# Patient Record
Sex: FEMALE | Race: WHITE | NOT HISPANIC OR LATINO | Employment: FULL TIME | URBAN - METROPOLITAN AREA
[De-identification: names, ages, dates, MRNs, and addresses within clinical notes are randomized per-mention and may not be internally consistent; named-entity substitution may affect disease eponyms.]

---

## 2016-05-26 LAB — HCV AB SER-ACNC: NEGATIVE

## 2017-01-10 ENCOUNTER — ALLSCRIPTS OFFICE VISIT (OUTPATIENT)
Dept: OTHER | Facility: OTHER | Age: 52
End: 2017-01-10

## 2017-04-07 ENCOUNTER — GENERIC CONVERSION - ENCOUNTER (OUTPATIENT)
Dept: OTHER | Facility: OTHER | Age: 52
End: 2017-04-07

## 2017-06-13 ENCOUNTER — LAB CONVERSION - ENCOUNTER (OUTPATIENT)
Dept: OTHER | Facility: OTHER | Age: 52
End: 2017-06-13

## 2017-06-13 ENCOUNTER — GENERIC CONVERSION - ENCOUNTER (OUTPATIENT)
Dept: OTHER | Facility: OTHER | Age: 52
End: 2017-06-13

## 2017-06-13 LAB
INR PPP: 1.6
PROTHROMBIN TIME: 16.9 SEC (ref 9–11.5)

## 2017-06-22 ENCOUNTER — ALLSCRIPTS OFFICE VISIT (OUTPATIENT)
Dept: OTHER | Facility: OTHER | Age: 52
End: 2017-06-22

## 2017-06-27 ENCOUNTER — GENERIC CONVERSION - ENCOUNTER (OUTPATIENT)
Dept: OTHER | Facility: OTHER | Age: 52
End: 2017-06-27

## 2017-06-27 LAB
ADDITIONAL INFORMATION (HISTORICAL): NORMAL
ADEQUACY: (HISTORICAL): NORMAL
COMMENT (HISTORICAL): NORMAL
CYTOTECHNOLOGIST: (HISTORICAL): NORMAL
GENERAL CATEGORIZATION (HISTORICAL): NORMAL
INFECTION (HISTORICAL): NORMAL
INTERPRETATION (HISTORICAL): NORMAL
LMP (HISTORICAL): NORMAL
PATHOLOGIST (HISTORICAL): NORMAL
PREV. BX: (HISTORICAL): NORMAL
PREV. PAP (HISTORICAL): NORMAL
REVIEWED BY (HISTORICAL): NORMAL
SOURCE (HISTORICAL): NORMAL

## 2017-08-22 ENCOUNTER — ALLSCRIPTS OFFICE VISIT (OUTPATIENT)
Dept: OTHER | Facility: OTHER | Age: 52
End: 2017-08-22

## 2018-01-02 ENCOUNTER — ALLSCRIPTS OFFICE VISIT (OUTPATIENT)
Dept: OTHER | Facility: OTHER | Age: 53
End: 2018-01-02

## 2018-01-11 NOTE — RESULT NOTES
Verified Results  (1) PT WITH INR [id number] 12:44PM Zhao Hunger   REPORT COMMENT:  REQ ON HOLD  FASTING:YES     Test Name Result Flag Reference   INR 1 7 H    Reference Range                     0 9-1 1  Moderate-intensity Warfarin Therapy 2 0-3 0  Higher-intensity Warfarin Therapy   3 0-4 0   PT 17 4 sec H 9 0-11 5   For more information on this test, go to:  http://[employer]/faq/YRE866

## 2018-01-13 VITALS
SYSTOLIC BLOOD PRESSURE: 122 MMHG | HEIGHT: 66 IN | HEART RATE: 84 BPM | DIASTOLIC BLOOD PRESSURE: 78 MMHG | TEMPERATURE: 97.5 F | RESPIRATION RATE: 16 BRPM

## 2018-01-13 VITALS
DIASTOLIC BLOOD PRESSURE: 76 MMHG | HEART RATE: 87 BPM | HEIGHT: 66 IN | SYSTOLIC BLOOD PRESSURE: 120 MMHG | OXYGEN SATURATION: 99 % | RESPIRATION RATE: 14 BRPM | TEMPERATURE: 97.7 F

## 2018-01-13 NOTE — MISCELLANEOUS
Provider Comments  Provider Comments:   Dear Kary Aquino had a scheduled appointment at our office 06/13/2016 that you called to cancel but did not reschedule for another day  It is very important that you follow up with us so that we can assess your physical and nutritional safety after your surgery  Please call our office at [phone] to reschedule your appointment       Sincerely,     Anna Marie Pitts [facility]        Signatures   Electronically signed by : Margoth Cox [facility]; Rito 10 2016  3:56PM EST                       (Author)

## 2018-01-14 VITALS
DIASTOLIC BLOOD PRESSURE: 82 MMHG | WEIGHT: 188 LBS | BODY MASS INDEX: 30.22 KG/M2 | HEIGHT: 66 IN | SYSTOLIC BLOOD PRESSURE: 132 MMHG

## 2018-01-14 NOTE — RESULT NOTES
Verified Results  (1) PT WITH INR 12Jun2017 02:37PM Margarita Pride     Test Name Result Flag Reference   INR 1 6 H    Reference Range                     0 9-1 1  Moderate-intensity Warfarin Therapy 2 0-3 0  Higher-intensity Warfarin Therapy   3 0-4 0   PT 16 9 sec H 9 0-11 5   For more information on this test, go to:  [email or web address]/faq/CCQ889

## 2018-01-14 NOTE — MISCELLANEOUS
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)   4  Factor V Leiden mutation (289 81) (D68 51)   5  Flu vaccine need (V04 81) (Z23)   6  Hypertension (401 9) (I10)   7  Leg pain (729 5) (M79 606)   8  Obesity (278 00) (E66 9)   9  Obesity surgery status (V45 86) (Z98 84)   10  Postgastrectomy malabsorption (579 3) (K91 2,Z90 3)   11  Pre-operative cardiovascular examination (V72 81) (Z01 810)   12  Pulmonary embolism (415 19) (I26 99)   13  Tinea corporis (110 5) (B35 4)   14  UTI (urinary tract infection) (599 0) (N39 0)   15  Viral syndrome (079 99) (B34 9)   16  Vitamin D deficiency (268 9) (E55 9)    Current Meds   1  ALPRAZolam 0 25 MG Oral Tablet; TAKE ONE TABLET BY MOUTH THREE TIMES   DAILY; Therapy: 46QLS9279 to (Evaluate:05Nov2015); Last Rx:[id number] Ordered   2  Azithromycin 250 MG Oral Tablet; TAKE 2 TABLETS ON DAY 1 THEN TAKE 1 TABLET A   DAY FOR 4 DAYS; Therapy: 48SLE7223 to (Benjamin Brown)  Requested for: 63RAQ7750; Last   Rx:85Axu4636 Ordered   3  Biotin TABS; Take as directed Recorded   4  Calcium Citrate TABS; TAKE 1 TABLET AS DIRECTED Recorded   5  Ciprofloxacin HCl - 500 MG Oral Tablet; TAKE 1 TABLET EVERY 12 HOURS DAILY; Therapy: 23XDR6565 to (Evaluate:15Mar2016)  Requested for: 40IHS5517; Last   Rx:10Mar2016 Ordered   6  Econazole Nitrate 1 % External Cream; APPLY SPARINGLY TO THE AFFECTED   AREA(S) TWICE DAILY; Therapy: 08CBN4738 to (Evaluate:95Aju1904)  Requested for: 94GWS2666; Last   Rx:18Jan2016 Ordered   7  Lisinopril 10 MG Oral Tablet; take 1 tablet by mouth every day; Therapy: 72QDV6741 to (Evaluate:73Kbv2678)  Requested for: 57IJE4797; Last   Rx:93Rqo9489 Ordered   8  Multivitamins Oral Capsule; TAKE 1 CAPSULE DAILY; Therapy: (Gretchen Rose) to Recorded   9   Omeprazole 20 MG Oral Capsule Delayed Release; TAKE 1 CAPSULE DAILY; Therapy: 04KOX2503 to (Fadia Dejesus)  Requested for: 37JJW4195; Last   Rx:21Ffb0548 Ordered   10  Pantoprazole Sodium 20 MG Oral Tablet Delayed Release; TAKE 1 TABLET DAILY; Therapy: 50YIL9230 to (Aldo Macedo)  Requested for: 94MCP8492; Last    Rx:90Zdy8283; Status: ACTIVE - Renewal Denied Ordered   11  Vitamin B-12 SUBL; 1 every day Recorded   12  Warfarin Sodium 5 MG Oral Tablet; TAKE 2 TABLETS EVERY DAY; Therapy: 18UVQ5778 to (Evaluate:12Cjz1625)  Requested for: 94Cja6940; Last    Rx:77Gpl5591 Ordered    Allergies    1   Sudafed TABS    Signatures   Electronically signed by : Ricky Davila, ; Aug 26 2016 12:51PM EST                       (Author)

## 2018-01-14 NOTE — MISCELLANEOUS
Message  Return to work or school:   Maria Guadalupe Bone is under my professional care  She was seen in my office on 1/18/16   She is able to return to work on  1/20/16            Signatures   Electronically signed by :  Elvia Pardo MD; Jan 18 2016  5:01PM EST                       (Author)

## 2018-01-16 NOTE — RESULT NOTES
Verified Results  (1) PT WITH INR 04Apr2017 12:31PM Tabatha Owusu     Test Name Result Flag Reference   INR 2 4 H    Reference Range                     0 9-1 1  Moderate-intensity Warfarin Therapy 2 0-3 0  Higher-intensity Warfarin Therapy   3 0-4 0   PT 24 6 sec H 9 0-11 5   For more information on this test, go to:  http://[employer]/faq/[email or web address]       Plan  Pulmonary embolism    · Warfarin Sodium 5 MG Oral Tablet; TAKE 2 TABLETS EVERY DAY

## 2018-01-17 DIAGNOSIS — Z79.01 LONG TERM CURRENT USE OF ANTICOAGULANT: ICD-10-CM

## 2018-01-17 NOTE — PROGRESS NOTES
Assessment    1  Tinea corporis (110 5) (B35 4)   2  Acute bronchitis (466 0) (J20 9)    Plan  Acute bronchitis    · Azithromycin 250 MG Oral Tablet; TAKE 2 TABLETS ON DAY 1 THEN TAKE 1  TABLET A DAY FOR 4 DAYS  Tinea corporis    · Econazole Nitrate 1 % External Cream; APPLY SPARINGLY TO THE AFFECTED  AREA(S) TWICE DAILY   · Fluconazole 100 MG Oral Tablet; TAKE 1 TABLET DAILY AS DIRECTED    Discussion/Summary    See how pt does on meds  Chief Complaint    1  Cold Symptoms  pt complains of cough, congestion, fatigue, fever sore throat diarrhea and a skin sore x 2 wks      History of Present Illness  Cold Symptoms:   Marisa Yao presents with complaints of frequent episodes of moderate cold symptoms  Symptoms are unchanged  Associated symptoms include nasal congestion, sore throat, dry cough, headache, plugged ear(s), wheezing, fatigue, diarrhea, fever and chills  Review of Systems    Constitutional: fever, feeling poorly and chills, but no recent weight gain and no recent weight loss  ENT: earache and sore throat  Respiratory: cough and wheezing  Integumentary: rash  Neurological: headache  Active Problems    1  Acute deep vein thrombosis of lower limb, unspecified laterality   2  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)   3  Factor V Leiden mutation (289 81) (D68 51)   4  Flu vaccine need (V04 81) (Z23)   5  Hypertension (401 9) (I10)   6  Leg pain (729 5) (M79 606)   7  Obesity (278 00) (E66 9)   8  Obesity surgery status (V45 86) (Z98 84)   9  Postgastrectomy malabsorption (579 3) (K91 2)   10  Pre-operative cardiovascular examination (V72 81) (Z01 810)   11  Pulmonary embolism (415 19) (I26 99)   12  UTI (urinary tract infection) (599 0) (N39 0)   13  Viral syndrome (079 99) (B34 9)   14  Vitamin D deficiency (268 9) (E55 9)    Past Medical History    1  History of sleep apnea (V13 89) (Z87 09)   2  History of Morbid or severe obesity due to excess calories (278 01) (E66 01)   3   History of Pulmonary embolism (415 19) (I26 99)   4  History of Pulmonary Embolism (V12 55)   5  History of URI (upper respiratory infection) (465 9) (J06 9)  Active Problems And Past Medical History Reviewed: The active problems and past medical history were reviewed and updated today  Family History    1  Family history of Arthritis (V17 7)   2  Family history of Congestive Heart Failure   3  Family history of Heart Disease (V17 49)   4  Family history of Hypertension (V17 49)  Family History Reviewed: The family history was reviewed and updated today  Social History    · Being A Social Drinker   ·    · Denied: History of Drug Use   · Exercise Frequency (Times/Week)   · Never A Smoker   · One child  The social history was reviewed and updated today  Surgical History    1  History of Complete Colonoscopy   2  History of Gallbladder Surgery   3  History of Gastric Surgery For Morbid Obesity Laparoscopic Longitudinal Gastrectomy   4  History of Radiologic Supervision: Felicitas Filter Placement In IVC  Surgical History Reviewed: The surgical history was reviewed and updated today  Current Meds   1  ALPRAZolam 0 25 MG Oral Tablet; TAKE ONE TABLET BY MOUTH THREE TIMES   DAILY; Therapy: 23SKI8221 to (Evaluate:05Nov2015); Last Rx:33Llu7067 Ordered   2  Biotin TABS; Take as directed Recorded   3  Calcium Citrate TABS; TAKE 1 TABLET AS DIRECTED Recorded   4  Lisinopril 10 MG Oral Tablet; take 1 tablet by mouth every day; Therapy: 06DKJ7550 to (Evaluate:22Sgu9397)  Requested for: 37ARC7233; Last   Rx:05Npd6142 Ordered   5  Multivitamins Oral Capsule; TAKE 1 CAPSULE DAILY; Therapy: (21 ) to Recorded   6  Pantoprazole Sodium 20 MG Oral Tablet Delayed Release; TAKE 1 TABLET DAILY; Therapy: 41ZEO0072 to (Marquis Guadarrama)  Requested for: 41XGR2291; Last   Rx:65Neu6621; Status: ACTIVE - Renewal Denied Ordered   7  Vitamin B-12 SUBL; 1 every day Recorded   8   Warfarin Sodium 5 MG Oral Tablet; TAKE 2 TABLETS EVERY DAY; Therapy: 07BHB1133 to (Evaluate:84Atz4642)  Requested for: 15Dmb7824; Last   Rx:81Hxa9814 Ordered    The medication list was reviewed and updated today  Allergies    1  Sudafed TABS    Vitals   Recorded: 72UEE2259 04:35PM   Temperature 99 2 F   Heart Rate 76   Systolic 281   Diastolic 70   Height 5 ft 5 5 in   Weight 188 lb    BMI Calculated 30 81   BSA Calculated 1 93     Physical Exam    Constitutional   General appearance: Abnormal   acutely ill, overweight and appears tired  Ears, Nose, Mouth, and Throat   Otoscopic examination: Tympanic membranes translucent with normal light reflex  Canals patent without erythema  Oropharynx: Normal with no erythema, edema, exudate or lesions  Pulmonary   Auscultation of lungs: Abnormal   wheezing over both bases  Cardiovascular   Auscultation of heart: Normal rate and rhythm, normal S1 and S2, without murmurs  Lymphatic   Palpation of lymph nodes in neck: No lymphadenopathy  Future Appointments    Date/Time Provider Specialty Site   04/18/2016 03:30 PM Ghazala Blanco, [facility] [address]     Signatures   Electronically signed by :  Shani Torres MD; Jan 18 2016  4:58PM EST                       (Author)

## 2018-01-21 NOTE — PROGRESS NOTES
Assessment   1  Flu syndrome (487 1) (J11 1)    Plan   Flu syndrome    · Oseltamivir Phosphate 75 MG Oral Capsule (Tamiflu); TAKE 1 CAPSULE TWICE    DAILY WITH MEALS    Chief Complaint   fever/sore throat/body aches/sweats & chills      History of Present Illness   HPI: achy fever since last night did have flu shot Taking APAP      Review of Systems        Constitutional: fever,-- feeling poorly-- and-- chills  ENT: no ear ache, no loss of hearing, no nosebleeds or nasal discharge, no sore throat or hoarseness  Cardiovascular: no complaints of slow or fast heart rate, no chest pain, no palpitations, no leg claudication or lower extremity edema  Respiratory: cough  Breasts: no complaints of breast pain, breast lump or nipple discharge  Gastrointestinal: no complaints of abdominal pain, no constipation, no nausea or diarrhea, no vomiting, no bloody stools  Genitourinary: no complaints of dysuria, no incontinence, no pelvic pain, no dysmenorrhea, no vaginal discharge or abnormal vaginal bleeding  Musculoskeletal: no complaints of arthralgia, no myalgia, no joint swelling or stiffness, no limb pain or swelling  Integumentary: no complaints of skin rash or lesion, no itching or dry skin, no skin wounds  Neurological: no complaints of headache, no confusion, no numbness or tingling, no dizziness or fainting  Active Problems   1  Acute bronchitis (466 0) (J20 9)   2  Acute deep vein thrombosis of lower limb, unspecified laterality   3  Acute non-recurrent maxillary sinusitis (461 0) (J01 00)   4  Acute recurrent maxillary sinusitis (461 0) (J01 01)   5  Acute sinusitis (461 9) (J01 90)   6  Chronic anticoagulation (V58 61) (Z79 01)   7  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)   8  Factor V Leiden mutation (289 81) (D68 51)   9  Flu vaccine need (V04 81) (Z23)   10  Hypertension (401 9) (I10)   11  Leg pain (729 5) (M79 606)   12   Obesity (278 00) (E66 9)   13  Obesity surgery status (V45 86) (Z98 84)   14  Postgastrectomy malabsorption (579 3) (K91 2,Z90 3)   15  Pre-operative cardiovascular examination (V72 81) (Z01 810)   16  Pulmonary embolism (415 19) (I26 99)   17  Situational anxiety (300 09) (F41 8)   18  Tinea corporis (110 5) (B35 4)   19  Vitamin D deficiency (268 9) (E55 9)    Past Medical History   1  History of sleep apnea (V13 89) (Z86 69)   2  History of Morbid or severe obesity due to excess calories (278 01) (E66 01)   3  History of Pulmonary embolism (415 19) (I26 99)   4  History of Pulmonary Embolism (V12 55)  Active Problems And Past Medical History Reviewed: The active problems and past medical history were reviewed and updated today  Family History   Father    1  Family history of Arthritis (V17 7)   2  Family history of Congestive Heart Failure   3  Family history of Heart Disease (V17 49)   4  Family history of Hypertension (V17 49)    Social History    · Being A Social Drinker   · Current non-smoker (V49 89) (Z78 9)   ·    · Denied: History of Drug Use   · Exercise Frequency (Times/Week)   · One child  The social history was reviewed and updated today  The social history was reviewed and is unchanged  Surgical History   1  History of Complete Colonoscopy   2  History of Gallbladder Surgery   3  History of Gastric Surgery For Morbid Obesity Laparoscopic Longitudinal Gastrectomy   4  History of Radiologic Supervision: Felicitas Filter Placement In IVC    Current Meds    1  Biotin TABS; Take as directed Recorded   2  Calcium Citrate TABS; TAKE 1 TABLET AS DIRECTED Recorded   3  Lisinopril 10 MG Oral Tablet; take 1 tablet by mouth every day; Therapy: 58RHE9693 to (Evaluate:99Bnk9723)  Requested for: 58Sgj0359; Last     Rx:21Qhm7742 Ordered   4  Multivitamins Oral Capsule; TAKE 1 CAPSULE DAILY; Therapy: (Bonny Gunter) to Recorded   5  Omeprazole 20 MG Oral Capsule Delayed Release; take 1 capsule daily;      Therapy: 48IAQ7491 to (Evaluate:35Yjf6052)  Requested for: 92Dua7305; Last     Rx:23Cep6605 Ordered   6  Vitamin B-12 SUBL; 1 every day Recorded   7  Warfarin Sodium 5 MG Oral Tablet; TAKE 2 TABLETS EVERY DAY; Therapy: 88SOP9565 to (Evaluate:47Ftf5898)  Requested for: 40Ewg2029; Last     Rx:58Zzm2200 Ordered     The medication list was reviewed and updated today  Allergies   1  Sudafed TABS    Vitals    Recorded: 79WWU9327 02:47PM   Temperature 101 F    Heart Rate 78    Respiration 18    Systolic 655    Diastolic 74    Height 5 ft 5 5 in    Patient Refused Weight Yes Yes     Physical Exam        Constitutional      General appearance: No acute distress, well appearing and well nourished  Ears, Nose, Mouth, and Throat      Otoscopic examination: Tympanic membranes translucent with normal light reflex  Canals patent without erythema  Oropharynx: Normal with no erythema, edema, exudate or lesions  Pulmonary      Auscultation of lungs: Clear to auscultation  Cardiovascular      Auscultation of heart: Normal rate and rhythm, normal S1 and S2, without murmurs  Abdomen      Abdomen: Non-tender, no masses  Lymphatic      Palpation of lymph nodes in neck: No lymphadenopathy  Skin      Skin and subcutaneous tissue: Normal without rashes or lesions         Psychiatric      Orientation to person, place, and time: Normal        Mood and affect: Normal           Signatures    Electronically signed by : Brian Oliva DO; Jan 20 2018  8:24PM EST                       (Author)

## 2018-01-22 VITALS
RESPIRATION RATE: 18 BRPM | DIASTOLIC BLOOD PRESSURE: 74 MMHG | TEMPERATURE: 101 F | HEIGHT: 66 IN | HEART RATE: 78 BPM | SYSTOLIC BLOOD PRESSURE: 126 MMHG

## 2018-01-23 NOTE — MISCELLANEOUS
Message  Return to work or school:   Marisa Yao is under my professional care   She was seen in my office on 01/02/2018   She is able to return to work on  01/08/2018            Signatures   Electronically signed by : Stuart Mccormick DO; Jan 2 2018  3:31PM EST                       (Author)

## 2018-02-11 ENCOUNTER — TELEPHONE (OUTPATIENT)
Dept: FAMILY MEDICINE CLINIC | Facility: CLINIC | Age: 53
End: 2018-02-11

## 2018-02-12 NOTE — TELEPHONE ENCOUNTER
Having obstipation and abd pain  Has tried multile methods for relief    Instructed pt that she may use one more fleets enema and if not effective may need er evaluation

## 2018-02-16 ENCOUNTER — OFFICE VISIT (OUTPATIENT)
Dept: FAMILY MEDICINE CLINIC | Facility: CLINIC | Age: 53
End: 2018-02-16
Payer: COMMERCIAL

## 2018-02-16 VITALS — SYSTOLIC BLOOD PRESSURE: 100 MMHG | HEART RATE: 74 BPM | TEMPERATURE: 97.7 F | DIASTOLIC BLOOD PRESSURE: 60 MMHG

## 2018-02-16 DIAGNOSIS — R10.13 EPIGASTRIC PAIN: ICD-10-CM

## 2018-02-16 DIAGNOSIS — K59.01 SLOW TRANSIT CONSTIPATION: Primary | ICD-10-CM

## 2018-02-16 PROCEDURE — 99213 OFFICE O/P EST LOW 20 MIN: CPT | Performed by: NURSE PRACTITIONER

## 2018-02-16 RX ORDER — MONTELUKAST SODIUM 10 MG/1
1 TABLET ORAL
COMMUNITY
Start: 2017-08-22 | End: 2018-08-02 | Stop reason: SDUPTHER

## 2018-02-16 RX ORDER — SIMETHICONE 180 MG
180 CAPSULE ORAL 2 TIMES DAILY PRN
Qty: 30 CAPSULE | Refills: 0 | Status: SHIPPED | OUTPATIENT
Start: 2018-02-16 | End: 2019-03-18 | Stop reason: CLARIF

## 2018-02-16 RX ORDER — OMEPRAZOLE 20 MG/1
1 CAPSULE, DELAYED RELEASE ORAL DAILY
COMMUNITY
Start: 2014-03-07 | End: 2018-08-29 | Stop reason: SDUPTHER

## 2018-02-16 RX ORDER — MULTIVITAMIN
1 CAPSULE ORAL DAILY
COMMUNITY

## 2018-02-16 RX ORDER — BIOTIN 1 MG
1 TABLET ORAL DAILY
COMMUNITY
End: 2021-11-12 | Stop reason: ALTCHOICE

## 2018-02-16 RX ORDER — FLUTICASONE PROPIONATE 50 MCG
1 SPRAY, SUSPENSION (ML) NASAL DAILY
COMMUNITY
Start: 2017-08-22 | End: 2018-08-29 | Stop reason: SDUPTHER

## 2018-02-16 RX ORDER — WARFARIN SODIUM 5 MG/1
2 TABLET ORAL DAILY
COMMUNITY
Start: 2014-09-08 | End: 2018-04-30 | Stop reason: SDUPTHER

## 2018-02-16 RX ORDER — CALCIUM CITRATE 1040MG
1 TABLET ORAL DAILY
COMMUNITY
End: 2021-11-12 | Stop reason: ALTCHOICE

## 2018-02-16 RX ORDER — POLYETHYLENE GLYCOL 3350 17 G/17G
17 POWDER, FOR SOLUTION ORAL DAILY
Qty: 30 EACH | Refills: 0 | Status: SHIPPED | OUTPATIENT
Start: 2018-02-16 | End: 2019-03-18 | Stop reason: CLARIF

## 2018-02-16 RX ORDER — FAMOTIDINE 40 MG/1
40 TABLET, FILM COATED ORAL 2 TIMES DAILY
Qty: 28 TABLET | Refills: 0 | Status: SHIPPED | OUTPATIENT
Start: 2018-02-16 | End: 2020-01-14 | Stop reason: ALTCHOICE

## 2018-02-16 RX ORDER — MAGNESIUM 200 MG
1 TABLET ORAL DAILY
COMMUNITY

## 2018-02-16 NOTE — PATIENT INSTRUCTIONS
Constipation   AMBULATORY CARE:   Constipation  is when you have hard, dry bowel movements, or you go longer than usual between bowel movements  Constipation may be caused by a lack of water or high-fiber foods  Medicines used to treat pain or depression, or a lack of physical activity may also cause constipation  Common symptoms include the following:   · Trouble pushing out your bowel movement    · Pain or bleeding during your bowel movement    · A feeling that you did not finish having your bowel movement    · Nausea    · Bloating    · Headache  Seek immediate care for the following symptoms:   · Blood in your bowel movement    · A fever and abdominal pain with the constipation  Contact your healthcare provider if:   · Your constipation gets worse  · You start to vomit  · You have questions or concerns about your condition or care  Medicines:   · Medicine or a fiber supplement  may help make your bowel movement softer  A laxative may help relax and loosen your intestines to help you have a bowel movement  You may also be given medicine to increase fluid in your intestines  The fluid may help move bowel movements through your intestines  · Take your medicine as directed  Contact your healthcare provider if you think your medicine is not helping or if you have side effects  Tell him of her if you are allergic to any medicine  Keep a list of the medicines, vitamins, and herbs you take  Include the amounts, and when and why you take them  Bring the list or the pill bottles to follow-up visits  Carry your medicine list with you in case of an emergency  Manage or prevent constipation:   · Drink liquids as directed  You may need to drink extra liquids to help soften and move your bowels  Ask how much liquid to drink each day and which liquids are best for you  · Eat high-fiber foods  This may help decrease constipation by adding bulk to your bowel movements   High-fiber foods include fruit, vegetables, whole-grain breads and cereals, and beans  Your healthcare provider or dietitian can help you create a high-fiber meal plan  · Exercise regularly  Regular physical activity can help stimulate your intestines  Ask which exercises are best for you  · Schedule a time each day to have a bowel movement  This may help train your body to have regular bowel movements  Bend forward while you are on the toilet to help move the bowel movement out  Sit on the toilet for at least 10 minutes, even if you do not have a bowel movement  Follow up with your healthcare provider as directed:  Write down your questions so you remember to ask them during your visits  © 2017 [facility] Information is for End User's use only and may not be sold, redistributed or otherwise used for commercial purposes  All illustrations and images included in CareNotes® are the copyrighted property of [employer]  or Dru Wright  The above information is an  only  It is not intended as medical advice for individual conditions or treatments  Talk to your doctor, nurse or pharmacist before following any medical regimen to see if it is safe and effective for you

## 2018-02-16 NOTE — PROGRESS NOTES
Chief Complaint   Patient presents with    GI Problem     constipation, burning sensation and chest pain  Assessment/Plan:    No problem-specific Assessment & Plan notes found for this encounter  Diagnoses and all orders for this visit:    Slow transit constipation  -     polyethylene glycol (MIRALAX) 17 g packet; Take 17 g by mouth daily    Epigastric pain  -     famotidine (PEPCID) 40 MG tablet; Take 1 tablet (40 mg total) by mouth 2 (two) times a day for 14 days  -     CBC and differential; Future  -     Lipase; Future  -     Amylase; Future  -     simethicone (MYLICON,GAS-X) 611 MG capsule; Take 1 capsule (180 mg total) by mouth 2 (two) times a day as needed for flatulence          Subjective:      Patient ID: Pia Kendall Friday is a 50 y o  female  Abdominal pain like epigastric; water seems to be the only thing relieving it  She feels dehydrated and feels like her skin is dry  Headaches; but has not had caffeine  Using nexium  She states she was having bowel movements for 3 days again  But feels like she is constipated again  Constipation   This is a new problem  The problem has been gradually worsening since onset  The stool is described as formed  The patient is on a high fiber diet  She does not exercise regularly  There has been adequate water intake  Associated symptoms include abdominal pain, back pain, bloating, flatus (mild) and rectal pain (but has subsided)  Pertinent negatives include no diarrhea, fever or hemorrhoids  She has tried enemas and laxatives for the symptoms  The treatment provided moderate relief  Her past medical history is significant for abdominal surgery  The following portions of the patient's history were reviewed and updated as appropriate: allergies, current medications, past family history, past medical history, past social history, past surgical history and problem list     Review of Systems   Constitutional: Positive for fatigue   Negative for appetite change and fever  Respiratory: Negative for cough  Gastrointestinal: Positive for abdominal pain, bloating, constipation, flatus (mild) and rectal pain (but has subsided)  Negative for diarrhea and hemorrhoids  Genitourinary: Negative for dysuria  Musculoskeletal: Positive for back pain  Neurological: Positive for headaches  Objective:      /60 (BP Location: Left arm, Patient Position: Sitting, Cuff Size: Adult)   Pulse 74   Temp 97 7 °F (36 5 °C) (Temporal)   LMP 02/03/2018   Breastfeeding? No          Physical Exam   Constitutional: Vital signs are normal  She appears well-developed and well-nourished  Cardiovascular: Normal rate, regular rhythm, S1 normal and S2 normal     Pulmonary/Chest: Effort normal and breath sounds normal    Abdominal: Soft  She exhibits distension  She exhibits no mass  Bowel sounds are increased  There is tenderness in the epigastric area and left lower quadrant

## 2018-02-17 LAB
AMYLASE SERPL-CCNC: 27 U/L (ref 21–101)
BASOPHILS # BLD AUTO: 29 CELLS/UL (ref 0–200)
BASOPHILS NFR BLD AUTO: 0.4 %
EOSINOPHIL # BLD AUTO: 122 CELLS/UL (ref 15–500)
EOSINOPHIL NFR BLD AUTO: 1.7 %
ERYTHROCYTE [DISTWIDTH] IN BLOOD BY AUTOMATED COUNT: 18.7 % (ref 11–15)
HCT VFR BLD AUTO: 30.9 % (ref 35–45)
HGB BLD-MCNC: 9.4 G/DL (ref 11.7–15.5)
INR PPP: 2
LIPASE SERPL-CCNC: 19 U/L (ref 7–60)
LYMPHOCYTES # BLD AUTO: 2225 CELLS/UL (ref 850–3900)
LYMPHOCYTES NFR BLD AUTO: 30.9 %
MCH RBC QN AUTO: 20.1 PG (ref 27–33)
MCHC RBC AUTO-ENTMCNC: 30.4 G/DL (ref 32–36)
MCV RBC AUTO: 66.2 FL (ref 80–100)
MONOCYTES # BLD AUTO: 590 CELLS/UL (ref 200–950)
MONOCYTES NFR BLD AUTO: 8.2 %
NEUTROPHILS # BLD AUTO: 4234 CELLS/UL (ref 1500–7800)
NEUTROPHILS NFR BLD AUTO: 58.8 %
PLATELET # BLD AUTO: 297 THOUSAND/UL (ref 140–400)
PMV BLD REES-ECKER: 10.5 FL (ref 7.5–12.5)
PROTHROMBIN TIME: 20.3 SEC (ref 9–11.5)
RBC # BLD AUTO: 4.67 MILLION/UL (ref 3.8–5.1)
WBC # BLD AUTO: 7.2 THOUSAND/UL (ref 3.8–10.8)

## 2018-02-22 ENCOUNTER — TELEPHONE (OUTPATIENT)
Dept: FAMILY MEDICINE CLINIC | Facility: CLINIC | Age: 53
End: 2018-02-22

## 2018-02-23 NOTE — TELEPHONE ENCOUNTER
hiralcb on Monday to give her results and also informed her it is not something to worry about over the weekend

## 2018-02-23 NOTE — TELEPHONE ENCOUNTER
Please call juan and let her know that she is anemic, low iron most likely cause   Could have an ulcer if still having pain  Would like her to ssee surgeon she had for baariatrics but if they wont see her we will refer her to someone else    All other lab looked ok so not worried about other gi issuees except that iron is constipating and she is already having problem

## 2018-02-26 DIAGNOSIS — D64.9 ANEMIA, UNSPECIFIED TYPE: ICD-10-CM

## 2018-02-26 DIAGNOSIS — R10.10 PAIN OF UPPER ABDOMEN: Primary | ICD-10-CM

## 2018-02-26 NOTE — TELEPHONE ENCOUNTER
Patient called back  And results were given to her  Patient will like Dr Maria Ines Ayala to give her a call back since she will like to ask her a few questions  Patient also said her Bariatric services were removed from her plan and she is unable to be seen by them again

## 2018-02-27 ENCOUNTER — TELEPHONE (OUTPATIENT)
Dept: FAMILY MEDICINE CLINIC | Facility: CLINIC | Age: 53
End: 2018-02-27

## 2018-02-27 DIAGNOSIS — D50.9 IRON DEFICIENCY ANEMIA, UNSPECIFIED IRON DEFICIENCY ANEMIA TYPE: Primary | ICD-10-CM

## 2018-02-27 NOTE — TELEPHONE ENCOUNTER
Update on why her iron level could be low  Patient wanted to update you that she has been on coumadin for years and she does have an irregular period where she spots an entire week before her period  When she does get her period she has very heavy bleeding for another week  She started with her period today as well

## 2018-02-27 NOTE — TELEPHONE ENCOUNTER
Pt called in to follow up on GI referral pt was under the impression that we were going to schedule the GI appointment  LVMTCB

## 2018-03-01 ENCOUNTER — OFFICE VISIT (OUTPATIENT)
Dept: GASTROENTEROLOGY | Facility: MEDICAL CENTER | Age: 53
End: 2018-03-01
Payer: COMMERCIAL

## 2018-03-01 VITALS
WEIGHT: 207 LBS | HEIGHT: 66 IN | DIASTOLIC BLOOD PRESSURE: 70 MMHG | TEMPERATURE: 96.5 F | SYSTOLIC BLOOD PRESSURE: 118 MMHG | BODY MASS INDEX: 33.27 KG/M2 | HEART RATE: 72 BPM

## 2018-03-01 DIAGNOSIS — Z12.11 COLON CANCER SCREENING: ICD-10-CM

## 2018-03-01 DIAGNOSIS — K21.9 GASTROESOPHAGEAL REFLUX DISEASE WITHOUT ESOPHAGITIS: ICD-10-CM

## 2018-03-01 DIAGNOSIS — R14.0 ABDOMINAL BLOATING: ICD-10-CM

## 2018-03-01 DIAGNOSIS — K59.04 CHRONIC IDIOPATHIC CONSTIPATION: Primary | ICD-10-CM

## 2018-03-01 DIAGNOSIS — D64.9 ANEMIA, UNSPECIFIED TYPE: ICD-10-CM

## 2018-03-01 LAB
ERYTHROCYTE [DISTWIDTH] IN BLOOD BY AUTOMATED COUNT: 18.3 % (ref 11–15)
HCT VFR BLD AUTO: 31.4 % (ref 35–45)
HGB BLD-MCNC: 9.2 G/DL (ref 11.7–15.5)
MCH RBC QN AUTO: 19.7 PG (ref 27–33)
MCHC RBC AUTO-ENTMCNC: 29.3 G/DL (ref 32–36)
MCV RBC AUTO: 67.2 FL (ref 80–100)
PLATELET # BLD AUTO: 331 THOUSAND/UL (ref 140–400)
PMV BLD REES-ECKER: 10.5 FL (ref 7.5–12.5)
RBC # BLD AUTO: 4.67 MILLION/UL (ref 3.8–5.1)
WBC # BLD AUTO: 6.3 THOUSAND/UL (ref 3.8–10.8)

## 2018-03-01 PROCEDURE — 99244 OFF/OP CNSLTJ NEW/EST MOD 40: CPT | Performed by: INTERNAL MEDICINE

## 2018-03-01 NOTE — LETTER
March 1, 2018     [place] DO [facility]  [address] [facility]  [address]    Patient: Jerardo Fuller Friday   YOB: 1965   Date of Visit: 3/1/2018       Dear Dr Deneen Feliz:    Thank you for referring Estefania Friday to me for evaluation  Below are my notes for this consultation  If you have questions, please do not hesitate to call me  I look forward to following your patient along with you           Sincerely,        Jean Paul Johnson MD        CC: No Recipients

## 2018-03-01 NOTE — PROGRESS NOTES
[facility] - Outpatient Consultation  Estefania VASQUEZ Friday 50 y o  female MRN: [id number]  Encounter: [id number]          ASSESSMENT AND PLAN:      1  Chronic idiopathic constipation  Recommend uptitration of miralax, discussed titration as needed - start with once daily and increase  Increase hydration, dietary fiber  Gave sample for 145 mcg linzess to attempt if symptoms not improved with miralax    2  Anemia, unspecified type  Rule out celiac disease  Plan for egd    - Ambulatory referral to Gastroenterology  - Tissue transglutaminase, IgA; Future  - IgA; Future  - Comprehensive metabolic panel; Future  - Case request operating room: EGD AND COLONOSCOPY; Standing  - Na Sulfate-K Sulfate-Mg Sulf (SUPREP BOWEL PREP KIT) 17 5-3 13-1 6 GM/180ML SOLN; Take 177 mL by mouth once for 1 dose  Dispense: 2 Bottle; Refill: 0  - Case request operating room: EGD AND COLONOSCOPY    3  Colon cancer screening  Recommend colonoscopy  She will hold her coumadin for 5 days prior to the procedure    4  Abdominal bloating  - Tissue transglutaminase, IgA; Future  - IgA; Future  - Comprehensive metabolic panel; Future    5  GERD  Discontinue nexium  Start pepcid as needed  Discussed avoidance of trigger foods, including tomatoes and orange juice    ______________________________________________________________________    HPI:      Ms  Friday is a 62-year-old female who is referred for evaluation of anemia, constipation, colorectal cancer screening  She has a past medical history of a sleeve gastrectomy for history of morbid obesity, and factor five Leiden deficiency on Coumadin  She complains of a three week history constipation associated with lower quadrant abdominal cramping and abdominal bloating  She has attempted MiraLax 17 g every other day, suppositories, enemas  She relates severe symptoms requiring one time use of digital disimpaction  She denies any rectal bleeding      Additionally, she is referred for evaluation of anemia  Her hemoglobin was recently 8 5  She is concerned this is secondary to dysfunctional uterine bleeding, that is being worked up by her gynecologist   At this time she is recommended to defer hysterectomy  She does note increasing fatigue from this however  She would like to start taking iron supplementation, however is concerned about her constipation  She takes nexium as needed for GERD  She relates triggers of orange juice and tomatoes, which she avoids  She denies use of over-the-counter NSAIDs  She was recently exposed to antibiotics for treatment of a sinus infection in December and flu in January  She has no family history of colo/rectal cancer  She has not had a screening colonoscopy  REVIEW OF SYSTEMS:    CONSTITUTIONAL: Denies any fever, chills, rigors, and weight loss  HEENT: No earache or tinnitus  Denies hearing loss or visual disturbances  CARDIOVASCULAR: No chest pain or palpitations  RESPIRATORY: Denies any cough, hemoptysis, shortness of breath or dyspnea on exertion  GASTROINTESTINAL: As noted in the History of Present Illness  GENITOURINARY: No problems with urination  Denies any hematuria or dysuria  NEUROLOGIC: No dizziness or vertigo, denies headaches  MUSCULOSKELETAL: Denies any muscle or joint pain  SKIN: Denies skin rashes or itching  ENDOCRINE: Denies excessive thirst  Denies intolerance to heat or cold  PSYCHOSOCIAL: Denies depression or anxiety  Denies any recent memory loss  Historical Information   Past Medical History:   Diagnosis Date    [facility]     [facility]     Pulmonary embolism (HCC)     S/P gastric surgery     gastric sleeve      No past surgical history on file    Social History   History   Alcohol Use    Yes     Comment: socially     History   Drug Use No     History   Smoking Status    Never Smoker   Smokeless Tobacco    Never Used     Family History   Problem Relation Age of Onset    Osteoporosis Mother     Diabetes Father     Heart disease Father        Meds/Allergies       Current Outpatient Prescriptions:     Biotin 1000 MCG tablet    Calcium Citrate 1040 MG TABS    Cyanocobalamin (VITAMIN B-12) 1000 MCG SUBL    famotidine (PEPCID) 40 MG tablet    fluticasone (FLONASE) 50 mcg/act nasal spray    montelukast (SINGULAIR) 10 mg tablet    Multiple Vitamin (MULTIVITAMIN) capsule    omeprazole (PriLOSEC) 20 mg delayed release capsule    polyethylene glycol (MIRALAX) 17 g packet    simethicone (MYLICON,GAS-X) 436 MG capsule    warfarin (COUMADIN) 5 mg tablet    Na Sulfate-K Sulfate-Mg Sulf (SUPREP BOWEL PREP KIT) 17 5-3 13-1 6 GM/180ML SOLN    Allergies   Allergen Reactions    Pseudoephedrine Tachycardia    Latex Rash           Objective     Blood pressure 118/70, pulse 72, temperature (!) 96 5 °F (35 8 °C), temperature source Tympanic, height 5' 6" (1 676 m), weight 93 9 kg (207 lb), last menstrual period 02/03/2018, not currently breastfeeding  PHYSICAL EXAM:      General Appearance:   Alert, cooperative, no distress   HEENT:   Normocephalic, atraumatic, anicteric      Neck:  Supple, symmetrical, trachea midline   Lungs:   Clear to auscultation bilaterally; no rales, rhonchi or wheezing; respirations unlabored    Heart[de-identified]   Regular rate and rhythm; no murmur, rub, or gallop  Abdomen:   Soft, non-tender, non-distended; normal bowel sounds; no masses, no organomegaly, + well healed laparoscopic RUQ scars s/p cholecystectomy    Genitalia:   Deferred    Rectal:   Deferred    Extremities:  No cyanosis, clubbing or edema    Pulses:  2+ and symmetric    Skin:  No jaundice, rashes, or lesions    Lymph nodes:  No palpable cervical lymphadenopathy        Lab Results:   No visits with results within 1 Day(s) from this visit     Latest known visit with results is:   Orders Only on 02/28/2018   Component Date Value    SL AMB LAB WHITE BLOOD C* 02/28/2018 6 3   AMB LAB RED BLOOD HERMINIA* 02/28/2018 4 67     Hemoglobin 02/28/2018 9 2*    Hematocrit 02/28/2018 31 4*    MCV 02/28/2018 67 2*    MCH 02/28/2018 19 7*    MCHC 02/28/2018 29 3*    RDW 02/28/2018 18 3*    Platelet Count 56/63/3471 331      AMB MPV 02/28/2018 10 5          Radiology Results:   No results found

## 2018-03-01 NOTE — LETTER
March 1, 2018     Caro Regan, DO  [address] [facility]  [address] [facility]    Patient: Lakeisha Rollins Friday   YOB: 1965   Date of Visit: 3/1/2018       Dear Dr Jesse Escalante:    Thank you for referring Estefania Friday to me for evaluation  Below are my notes for this consultation  If you have questions, please do not hesitate to call me  I look forward to following your patient along with you  Sincerely,        Oliva Tejada MD        CC: No Recipients  Oliva Tejada MD  3/1/2018 11:17 AM  Sign at close encounter  [facility] - Outpatient Consultation  Estefania VASQUEZ Friday 50 y o  female MRN: [id number]  Encounter: [id number]          ASSESSMENT AND PLAN:      1  Chronic idiopathic constipation  Recommend uptitration of miralax, discussed titration as needed - start with once daily and increase  Increase hydration, dietary fiber  Gave sample for 145 mcg linzess to attempt if symptoms not improved with miralax    2  Anemia, unspecified type  Rule out celiac disease  Plan for egd    - Ambulatory referral to Gastroenterology  - Tissue transglutaminase, IgA; Future  - IgA; Future  - Comprehensive metabolic panel; Future  - Case request operating room: EGD AND COLONOSCOPY; Standing  - Na Sulfate-K Sulfate-Mg Sulf (SUPREP BOWEL PREP KIT) 17 5-3 13-1 6 GM/180ML SOLN; Take 177 mL by mouth once for 1 dose  Dispense: 2 Bottle; Refill: 0  - Case request operating room: EGD AND COLONOSCOPY    3  Colon cancer screening  Recommend colonoscopy    4  Abdominal bloating  - Tissue transglutaminase, IgA; Future  - IgA; Future  - Comprehensive metabolic panel; Future    5  GERD  Discontinue nexium  Start pepcid as needed  Discussed avoidance of trigger foods, including tomatoes and orange juice    ______________________________________________________________________    HPI:      Ms  Friday is a 19-year-old female who is referred for evaluation of anemia, constipation, colorectal cancer screening  She has a past medical history of a sleeve gastrectomy for history of morbid obesity, and factor five Leiden deficiency on Coumadin  She complains of a three week history constipation associated with lower quadrant abdominal cramping and abdominal bloating  She has attempted MiraLax 17 g every other day, suppositories, enemas  She relates severe symptoms requiring one time use of digital disimpaction  She denies any rectal bleeding  Additionally, she is referred for evaluation of anemia  Her hemoglobin was recently 8 5  She is concerned this is secondary to dysfunctional uterine bleeding, that is being worked up by her gynecologist   At this time she is recommended to defer hysterectomy  She does note increasing fatigue from this however  She would like to start taking iron supplementation, however is concerned about her constipation  She takes nexium as needed for GERD  She relates triggers of orange juice and tomatoes, which she avoids  She denies use of over-the-counter NSAIDs  She was recently exposed to antibiotics for treatment of a sinus infection in December and flu in January  She has no family history of colo/rectal cancer  She has not had a screening colonoscopy  REVIEW OF SYSTEMS:    CONSTITUTIONAL: Denies any fever, chills, rigors, and weight loss  HEENT: No earache or tinnitus  Denies hearing loss or visual disturbances  CARDIOVASCULAR: No chest pain or palpitations  RESPIRATORY: Denies any cough, hemoptysis, shortness of breath or dyspnea on exertion  GASTROINTESTINAL: As noted in the History of Present Illness  GENITOURINARY: No problems with urination  Denies any hematuria or dysuria  NEUROLOGIC: No dizziness or vertigo, denies headaches  MUSCULOSKELETAL: Denies any muscle or joint pain  SKIN: Denies skin rashes or itching  ENDOCRINE: Denies excessive thirst  Denies intolerance to heat or cold  PSYCHOSOCIAL: Denies depression or anxiety   Denies any recent memory loss        Historical Information   Past Medical History:   Diagnosis Date    [facility]     [facility]     Pulmonary embolism (HCC)     S/P gastric surgery     gastric sleeve      No past surgical history on file  Social History   History   Alcohol Use    Yes     Comment: socially     History   Drug Use No     History   Smoking Status    Never Smoker   Smokeless Tobacco    Never Used     Family History   Problem Relation Age of Onset    Osteoporosis Mother     Diabetes Father     Heart disease Father        Meds/Allergies       Current Outpatient Prescriptions:     Biotin 1000 MCG tablet    Calcium Citrate 1040 MG TABS    Cyanocobalamin (VITAMIN B-12) 1000 MCG SUBL    famotidine (PEPCID) 40 MG tablet    fluticasone (FLONASE) 50 mcg/act nasal spray    montelukast (SINGULAIR) 10 mg tablet    Multiple Vitamin (MULTIVITAMIN) capsule    omeprazole (PriLOSEC) 20 mg delayed release capsule    polyethylene glycol (MIRALAX) 17 g packet    simethicone (MYLICON,GAS-X) 901 MG capsule    warfarin (COUMADIN) 5 mg tablet    Na Sulfate-K Sulfate-Mg Sulf (SUPREP BOWEL PREP KIT) 17 5-3 13-1 6 GM/180ML SOLN    Allergies   Allergen Reactions    Pseudoephedrine Tachycardia    Latex Rash           Objective     Blood pressure 118/70, pulse 72, temperature (!) 96 5 °F (35 8 °C), temperature source Tympanic, height 5' 6" (1 676 m), weight 93 9 kg (207 lb), last menstrual period 02/03/2018, not currently breastfeeding  PHYSICAL EXAM:      General Appearance:   Alert, cooperative, no distress   HEENT:   Normocephalic, atraumatic, anicteric      Neck:  Supple, symmetrical, trachea midline   Lungs:   Clear to auscultation bilaterally; no rales, rhonchi or wheezing; respirations unlabored    Heart[de-identified]   Regular rate and rhythm; no murmur, rub, or gallop     Abdomen:   Soft, non-tender, non-distended; normal bowel sounds; no masses, no organomegaly, + well healed laparoscopic RUQ scars s/p cholecystectomy    Genitalia:   Deferred    Rectal:   Deferred    Extremities:  No cyanosis, clubbing or edema    Pulses:  2+ and symmetric    Skin:  No jaundice, rashes, or lesions    Lymph nodes:  No palpable cervical lymphadenopathy        Lab Results:   No visits with results within 1 Day(s) from this visit  Latest known visit with results is:   Orders Only on 02/28/2018   Component Date Value     AMB LAB WHITE BLOOD C* 02/28/2018 6 3      AMB LAB RED BLOOD HERMINIA* 02/28/2018 4 67     Hemoglobin 02/28/2018 9 2*    Hematocrit 02/28/2018 31 4*    MCV 02/28/2018 67 2*    MCH 02/28/2018 19 7*    MCHC 02/28/2018 29 3*    RDW 02/28/2018 18 3*    Platelet Count 58/28/3243 331      AMB MPV 02/28/2018 10 5          Radiology Results:   No results found

## 2018-03-02 ENCOUNTER — TELEPHONE (OUTPATIENT)
Dept: GASTROENTEROLOGY | Facility: HOSPITAL | Age: 53
End: 2018-03-02

## 2018-03-06 ENCOUNTER — ANESTHESIA EVENT (OUTPATIENT)
Dept: GASTROENTEROLOGY | Facility: MEDICAL CENTER | Age: 53
End: 2018-03-06
Payer: COMMERCIAL

## 2018-03-08 ENCOUNTER — HOSPITAL ENCOUNTER (OUTPATIENT)
Facility: MEDICAL CENTER | Age: 53
Setting detail: OUTPATIENT SURGERY
Discharge: HOME | End: 2018-03-08
Attending: INTERNAL MEDICINE | Admitting: INTERNAL MEDICINE
Payer: COMMERCIAL

## 2018-03-08 ENCOUNTER — ANESTHESIA (OUTPATIENT)
Dept: GASTROENTEROLOGY | Facility: MEDICAL CENTER | Age: 53
End: 2018-03-08
Payer: COMMERCIAL

## 2018-03-08 VITALS
HEART RATE: 60 BPM | DIASTOLIC BLOOD PRESSURE: 61 MMHG | RESPIRATION RATE: 18 BRPM | SYSTOLIC BLOOD PRESSURE: 109 MMHG | BODY MASS INDEX: 32.14 KG/M2 | WEIGHT: 200 LBS | OXYGEN SATURATION: 100 % | HEIGHT: 66 IN | TEMPERATURE: 98 F

## 2018-03-08 DIAGNOSIS — Z12.11 COLON CANCER SCREENING: ICD-10-CM

## 2018-03-08 DIAGNOSIS — D64.9 ANEMIA, UNSPECIFIED TYPE: ICD-10-CM

## 2018-03-08 LAB — EXT PREGNANCY TEST URINE: NEGATIVE

## 2018-03-08 PROCEDURE — 43239 EGD BIOPSY SINGLE/MULTIPLE: CPT | Performed by: INTERNAL MEDICINE

## 2018-03-08 PROCEDURE — 81025 URINE PREGNANCY TEST: CPT | Performed by: ANESTHESIOLOGY

## 2018-03-08 PROCEDURE — G0121 COLON CA SCRN NOT HI RSK IND: HCPCS | Performed by: INTERNAL MEDICINE

## 2018-03-08 PROCEDURE — 88305 TISSUE EXAM BY PATHOLOGIST: CPT | Performed by: PATHOLOGY

## 2018-03-08 PROCEDURE — 88342 IMHCHEM/IMCYTCHM 1ST ANTB: CPT | Performed by: PATHOLOGY

## 2018-03-08 RX ORDER — PROPOFOL 10 MG/ML
INJECTION, EMULSION INTRAVENOUS AS NEEDED
Status: DISCONTINUED | OUTPATIENT
Start: 2018-03-08 | End: 2018-03-08 | Stop reason: SURG

## 2018-03-08 RX ORDER — SODIUM CHLORIDE 9 MG/ML
125 INJECTION, SOLUTION INTRAVENOUS CONTINUOUS
Status: DISCONTINUED | OUTPATIENT
Start: 2018-03-08 | End: 2018-03-08 | Stop reason: HOSPADM

## 2018-03-08 RX ADMIN — SODIUM CHLORIDE 125 ML/HR: 0.9 INJECTION, SOLUTION INTRAVENOUS at 09:10

## 2018-03-08 RX ADMIN — PROPOFOL 50 MG: 10 INJECTION, EMULSION INTRAVENOUS at 10:12

## 2018-03-08 RX ADMIN — PROPOFOL 100 MG: 10 INJECTION, EMULSION INTRAVENOUS at 09:47

## 2018-03-08 RX ADMIN — PROPOFOL 50 MG: 10 INJECTION, EMULSION INTRAVENOUS at 09:57

## 2018-03-08 RX ADMIN — PROPOFOL 50 MG: 10 INJECTION, EMULSION INTRAVENOUS at 10:05

## 2018-03-08 RX ADMIN — PROPOFOL 50 MG: 10 INJECTION, EMULSION INTRAVENOUS at 10:03

## 2018-03-08 RX ADMIN — PROPOFOL 200 MG: 10 INJECTION, EMULSION INTRAVENOUS at 09:46

## 2018-03-08 RX ADMIN — PROPOFOL 50 MG: 10 INJECTION, EMULSION INTRAVENOUS at 09:51

## 2018-03-08 NOTE — H&P (VIEW-ONLY)
[facility] - Outpatient Consultation  Estefania VASQUEZ Friday 50 y o  female MRN: [id number]  Encounter: [id number]          ASSESSMENT AND PLAN:      1  Chronic idiopathic constipation  Recommend uptitration of miralax, discussed titration as needed - start with once daily and increase  Increase hydration, dietary fiber  Gave sample for 145 mcg linzess to attempt if symptoms not improved with miralax    2  Anemia, unspecified type  Rule out celiac disease  Plan for egd    - Ambulatory referral to Gastroenterology  - Tissue transglutaminase, IgA; Future  - IgA; Future  - Comprehensive metabolic panel; Future  - Case request operating room: EGD AND COLONOSCOPY; Standing  - Na Sulfate-K Sulfate-Mg Sulf (SUPREP BOWEL PREP KIT) 17 5-3 13-1 6 GM/180ML SOLN; Take 177 mL by mouth once for 1 dose  Dispense: 2 Bottle; Refill: 0  - Case request operating room: EGD AND COLONOSCOPY    3  Colon cancer screening  Recommend colonoscopy  She will hold her coumadin for 5 days prior to the procedure    4  Abdominal bloating  - Tissue transglutaminase, IgA; Future  - IgA; Future  - Comprehensive metabolic panel; Future    5  GERD  Discontinue nexium  Start pepcid as needed  Discussed avoidance of trigger foods, including tomatoes and orange juice    ______________________________________________________________________    HPI:      Ms  Friday is a 53-year-old female who is referred for evaluation of anemia, constipation, colorectal cancer screening  She has a past medical history of a sleeve gastrectomy for history of morbid obesity, and factor five Leiden deficiency on Coumadin  She complains of a three week history constipation associated with lower quadrant abdominal cramping and abdominal bloating  She has attempted MiraLax 17 g every other day, suppositories, enemas  She relates severe symptoms requiring one time use of digital disimpaction  She denies any rectal bleeding      Additionally, she is referred for evaluation of anemia  Her hemoglobin was recently 8 5  She is concerned this is secondary to dysfunctional uterine bleeding, that is being worked up by her gynecologist   At this time she is recommended to defer hysterectomy  She does note increasing fatigue from this however  She would like to start taking iron supplementation, however is concerned about her constipation  She takes nexium as needed for GERD  She relates triggers of orange juice and tomatoes, which she avoids  She denies use of over-the-counter NSAIDs  She was recently exposed to antibiotics for treatment of a sinus infection in December and flu in January  She has no family history of colo/rectal cancer  She has not had a screening colonoscopy  REVIEW OF SYSTEMS:    CONSTITUTIONAL: Denies any fever, chills, rigors, and weight loss  HEENT: No earache or tinnitus  Denies hearing loss or visual disturbances  CARDIOVASCULAR: No chest pain or palpitations  RESPIRATORY: Denies any cough, hemoptysis, shortness of breath or dyspnea on exertion  GASTROINTESTINAL: As noted in the History of Present Illness  GENITOURINARY: No problems with urination  Denies any hematuria or dysuria  NEUROLOGIC: No dizziness or vertigo, denies headaches  MUSCULOSKELETAL: Denies any muscle or joint pain  SKIN: Denies skin rashes or itching  ENDOCRINE: Denies excessive thirst  Denies intolerance to heat or cold  PSYCHOSOCIAL: Denies depression or anxiety  Denies any recent memory loss  Historical Information   Past Medical History:   Diagnosis Date    [facility]     [facility]     Pulmonary embolism (HCC)     S/P gastric surgery     gastric sleeve      No past surgical history on file    Social History   History   Alcohol Use    Yes     Comment: socially     History   Drug Use No     History   Smoking Status    Never Smoker   Smokeless Tobacco    Never Used     Family History   Problem Relation Age of Onset    Osteoporosis Mother     Diabetes Father     Heart disease Father        Meds/Allergies       Current Outpatient Prescriptions:     Biotin 1000 MCG tablet    Calcium Citrate 1040 MG TABS    Cyanocobalamin (VITAMIN B-12) 1000 MCG SUBL    famotidine (PEPCID) 40 MG tablet    fluticasone (FLONASE) 50 mcg/act nasal spray    montelukast (SINGULAIR) 10 mg tablet    Multiple Vitamin (MULTIVITAMIN) capsule    omeprazole (PriLOSEC) 20 mg delayed release capsule    polyethylene glycol (MIRALAX) 17 g packet    simethicone (MYLICON,GAS-X) 883 MG capsule    warfarin (COUMADIN) 5 mg tablet    Na Sulfate-K Sulfate-Mg Sulf (SUPREP BOWEL PREP KIT) 17 5-3 13-1 6 GM/180ML SOLN    Allergies   Allergen Reactions    Pseudoephedrine Tachycardia    Latex Rash           Objective     Blood pressure 118/70, pulse 72, temperature (!) 96 5 °F (35 8 °C), temperature source Tympanic, height 5' 6" (1 676 m), weight 93 9 kg (207 lb), last menstrual period 02/03/2018, not currently breastfeeding  PHYSICAL EXAM:      General Appearance:   Alert, cooperative, no distress   HEENT:   Normocephalic, atraumatic, anicteric      Neck:  Supple, symmetrical, trachea midline   Lungs:   Clear to auscultation bilaterally; no rales, rhonchi or wheezing; respirations unlabored    Heart[de-identified]   Regular rate and rhythm; no murmur, rub, or gallop  Abdomen:   Soft, non-tender, non-distended; normal bowel sounds; no masses, no organomegaly, + well healed laparoscopic RUQ scars s/p cholecystectomy    Genitalia:   Deferred    Rectal:   Deferred    Extremities:  No cyanosis, clubbing or edema    Pulses:  2+ and symmetric    Skin:  No jaundice, rashes, or lesions    Lymph nodes:  No palpable cervical lymphadenopathy        Lab Results:   No visits with results within 1 Day(s) from this visit     Latest known visit with results is:   Orders Only on 02/28/2018   Component Date Value    SL AMB LAB WHITE BLOOD C* 02/28/2018 6 3   AMB LAB RED BLOOD HERMINIA* 02/28/2018 4 67     Hemoglobin 02/28/2018 9 2*    Hematocrit 02/28/2018 31 4*    MCV 02/28/2018 67 2*    MCH 02/28/2018 19 7*    MCHC 02/28/2018 29 3*    RDW 02/28/2018 18 3*    Platelet Count 70/49/4275 331      AMB MPV 02/28/2018 10 5          Radiology Results:   No results found

## 2018-03-08 NOTE — ANESTHESIA PREPROCEDURE EVALUATION
Review of Systems/Medical History  Patient summary reviewed  Chart reviewed  History of anesthetic complications PONV    Cardiovascular  Exercise tolerance: good,     Pulmonary  Negative pulmonary ROS        GI/Hepatic    GERD ,        Negative  ROS        Endo/Other  Negative endo/other ROS      GYN  Negative gynecology ROS          Hematology  Anemia ,     Musculoskeletal  Negative musculoskeletal ROS        Neurology  Negative neurology ROS      Psychology   Negative psychology ROS              Physical Exam    Airway    Mallampati score: II  TM Distance: <3 FB  Neck ROM: full     Dental       Cardiovascular  Rhythm: regular, Rate: normal,     Pulmonary  Breath sounds clear to auscultation,     Other Findings        Anesthesia Plan  ASA Score- 3     Anesthesia Type- IV sedation with anesthesia with ASA Monitors  Additional Monitors:   Airway Plan:         Plan Factors- Patient instructed to abstain from smoking on day of procedure  Patient did not smoke on day of surgery  Induction- intravenous  Postoperative Plan-     Informed Consent- Anesthetic plan and risks discussed with patient

## 2018-03-08 NOTE — OP NOTE
**** GI/ENDOSCOPY REPORT ****     PATIENT NAME: ALEXANDRIA VASQUEZ FRIDAY - VISIT ID:  Patient ID: [id number]   YOB: 1965     INTRODUCTION: Esophagogastroduodenoscopy - A 50 female patient presents   for an outpatient Esophagogastroduodenoscopy at [address] [place] [address]  INDICATIONS: GERD  Iron deficiency anemia  Abdominal pain  CONSENT: The benefits, risks, and alternatives to the procedure were   discussed and informed consent was obtained from the patient  PREPARATION:  EKG, pulse, pulse oximetry and blood pressure were monitored   throughout the procedure  ASA Classification: Class 2 - Patient has mild   to moderate systemic disturbance that may or may not be related to the   disorder requiring surgery  Airway Assessment Classification: Airway class   2 - Visualization of the soft palate, fauces and uvula  MEDICATIONS: MAC anesthesia  PROCEDURE:  The endoscope was passed without difficulty through the mouth   under direct visualization and advanced to the 2nd portion of the   duodenum  The scope was withdrawn and the mucosa was carefully examined  FINDINGS:   Esophagus: The entire esophagus appeared to be normal     Stomach: The antrum and body of the stomach appeared to be normal  A   biopsy was taken  The specimen was collected for an H  pylori antibody   test  Irregular fold in the gastric body  Multiple cold forceps biopsies   was taken  Duodenum: The duodenal bulb and 2nd portion of the duodenum   appeared to be normal  A biopsy was taken  COMPLICATIONS: There were no complications  IMPRESSIONS: Normal entire esophagus  Normal antrum and body of the   stomach  Biopsy taken  Irregular fold in the gastric body  Multiple   biopsies taken  Normal duodenal bulb and 2nd portion of the duodenum  Biopsy taken  RECOMMENDATIONS: Colonoscopy recommended  ESTIMATED BLOOD LOSS:     PATHOLOGY SPECIMENS: Random biopsy taken   Specimen collected for an H    pylori antibody test  Multiple cold forceps biopsies taken  Associated   finding: Irregular fold in the gastric body  Random biopsy taken  PROCEDURE CODES:     ICD-9 Codes: 530 81 Esophageal reflux 280 9 Iron deficiency anemia,   unspecified 789 00 Abdominal pain, unspecified site     ICD-10 Codes: K21 Gastro-esophageal reflux disease D50 9 Iron deficiency   anemia, unspecified R10 Abdominal and pelvic pain     PERFORMED BY: KIKO Elkins  on 03/08/2018  Version 1, electronically signed by KIKO Elkins  on 03/08/2018   at 09:58

## 2018-03-08 NOTE — OP NOTE
**** GI/ENDOSCOPY REPORT ****     PATIENT NAME: ALEXANDRIA VASQUEZ FRIDAY - VISIT ID:  Patient ID: [id number]   YOB: 1965     INTRODUCTION: Esophagogastroduodenoscopy - A 50 female patient presents   for an outpatient Esophagogastroduodenoscopy at [address] [place] [address]  INDICATIONS: GERD  Iron deficiency anemia  Abdominal pain  CONSENT: The benefits, risks, and alternatives to the procedure were   discussed and informed consent was obtained from the patient  PREPARATION:  EKG, pulse, pulse oximetry and blood pressure were monitored   throughout the procedure  ASA Classification: Class 2 - Patient has mild   to moderate systemic disturbance that may or may not be related to the   disorder requiring surgery  Airway Assessment Classification: Airway class   2 - Visualization of the soft palate, fauces and uvula  MEDICATIONS: MAC anesthesia  PROCEDURE:  The endoscope was passed without difficulty through the mouth   under direct visualization and advanced to the 2nd portion of the   duodenum  The scope was withdrawn and the mucosa was carefully examined  FINDINGS:   Esophagus: The entire esophagus appeared to be normal     Stomach: The antrum and body of the stomach appeared to be normal  A   biopsy was taken  The specimen was collected for an H  pylori antibody   test  Irregular fold in the gastric body  Multiple cold forceps biopsies   was taken  Duodenum: The duodenal bulb and 2nd portion of the duodenum   appeared to be normal  A biopsy was taken  COMPLICATIONS: There were no complications  IMPRESSIONS: Normal entire esophagus  Normal antrum and body of the   stomach  Biopsy taken  Irregular fold in the gastric body  Multiple   biopsies taken  Normal duodenal bulb and 2nd portion of the duodenum  Biopsy taken  RECOMMENDATIONS: Colonoscopy recommended  ESTIMATED BLOOD LOSS:     PATHOLOGY SPECIMENS: Random biopsy taken   Specimen collected for an H    pylori antibody test  Multiple cold forceps biopsies taken  Associated   finding: Irregular fold in the gastric body  Random biopsy taken  PROCEDURE CODES:     ICD-9 Codes: 530 81 Esophageal reflux 280 9 Iron deficiency anemia,   unspecified 789 00 Abdominal pain, unspecified site     ICD-10 Codes: K21 Gastro-esophageal reflux disease D50 9 Iron deficiency   anemia, unspecified R10 Abdominal and pelvic pain     PERFORMED BY: KIKO Whelan  on 03/08/2018  Version 2, modified and electronically signed by KIKO Whelan    on 03/08/2018 at 10:54, superseding version 1 signed on 03/08/2018 at   09:58

## 2018-03-08 NOTE — OP NOTE
**** GI/ENDOSCOPY REPORT ****     PATIENT NAME: Anmol Styles ------ VISIT ID:  Patient ID:   [id number] YOB: 1965     INTRODUCTION: Colonoscopy - A 50 female patient presents for an outpatient   Colonoscopy at [address] [place] [address]  PREVIOUS COLONOSCOPY: Patient's last colonoscopy was 9 years ago  INDICATIONS: Screening-ADR  CONSENT:  The benefits, risks, and alternatives to the procedure were   discussed and informed consent was obtained from the patient  PREPARATION: EKG, pulse, pulse oximetry and blood pressure were monitored   throughout the procedure  The patient was identified by myself both   verbally and by visual inspection of ID band  Airway Assessment   Classification: Airway class 2 - Visualization of the soft palate, fauces   and uvula  ASA Classification: See anesthesia record  MEDICATIONS: Anesthesia-check records     PROCEDURE:  The endoscope was passed without difficulty through the anus   under direct visualization and advanced to the cecum, confirmed by   appendiceal orifice and ileocecal valve  The scope was withdrawn and the   mucosa was carefully examined  The quality of the preparation was  Cecal   Intubation Time: Minute(s) Scope Withdrawal Time: Minute(s)     RECTAL EXAM: Normal rectal exam      FINDINGS:  The terminal ileum appeared to be normal   The colonoscopy   examination was completely normal      COMPLICATIONS: There were no complications  IMPRESSIONS: Normal terminal ileum  Normal colonoscopy  RECOMMENDATIONS: Resume regular diet as tolerated  Resume coumadin today  Pill cam - small bowel recommended  Patient to schedule a future   appointment today  ESTIMATED BLOOD LOSS:     PATHOLOGY SPECIMENS:     PROCEDURE CODES:     ICD-9 Codes:     ICD-10 Codes:     PERFORMED BY: KIKO Chery  on 03/08/2018  Version 1, electronically signed by KIKO Chery  on 03/08/2018   at 10:26

## 2018-03-08 NOTE — DISCHARGE INSTRUCTIONS

## 2018-03-08 NOTE — OP NOTE
**** GI/ENDOSCOPY REPORT ****     PATIENT NAME: Ivon Max ------ VISIT ID:  Patient ID:   [id number] YOB: 1965     INTRODUCTION: Colonoscopy - A 50 female patient presents for an outpatient   Colonoscopy at [address] [place] [address]  PREVIOUS COLONOSCOPY: Patient's last colonoscopy was 9 years ago  INDICATIONS: Screening-ADR  CONSENT:  The benefits, risks, and alternatives to the procedure were   discussed and informed consent was obtained from the patient  PREPARATION: EKG, pulse, pulse oximetry and blood pressure were monitored   throughout the procedure  The patient was identified by myself both   verbally and by visual inspection of ID band  Airway Assessment   Classification: Airway class 2 - Visualization of the soft palate, fauces   and uvula  ASA Classification: See anesthesia record  MEDICATIONS: Anesthesia-check records     PROCEDURE:  The endoscope was passed without difficulty through the anus   under direct visualization and advanced to the cecum, confirmed by   appendiceal orifice and ileocecal valve  The scope was withdrawn and the   mucosa was carefully examined  The quality of the preparation was  Cecal   Intubation Time: Minute(s) Scope Withdrawal Time: Minute(s)     RECTAL EXAM: Normal rectal exam      FINDINGS:  The terminal ileum appeared to be normal   The colonoscopy   examination was completely normal      COMPLICATIONS: There were no complications  IMPRESSIONS: Normal terminal ileum  Normal colonoscopy  RECOMMENDATIONS: Resume regular diet as tolerated  Resume coumadin today  Pill cam - small bowel recommended  Patient to schedule a future   appointment today    repeat colonoscopy in 10 years     ESTIMATED BLOOD LOSS:     PATHOLOGY SPECIMENS:     PROCEDURE CODES:     ICD-9 Codes:     ICD-10 Codes:     PERFORMED BY: KIKO Álvarez  on 03/08/2018  Version 2, modified and electronically signed by Dr Dion Blanchard KIKO Burrows    on 03/08/2018 at 10:54, superseding version 1 signed on 03/08/2018 at   10:26

## 2018-03-09 LAB
INR PPP: 1
INR PPP: 1 (ref 0.86–1.16)
PROTHROMBIN TIME: 9.8 SEC (ref 9–11.5)

## 2018-03-14 NOTE — PROGRESS NOTES
Please call patient with normal/benign results on biopsies of the stomach, small intestine, and irregular stomach fold

## 2018-03-16 ENCOUNTER — TELEPHONE (OUTPATIENT)
Dept: GASTROENTEROLOGY | Facility: MEDICAL CENTER | Age: 53
End: 2018-03-16

## 2018-03-16 NOTE — TELEPHONE ENCOUNTER
----- Message from Oliva Tejada MD sent at 3/15/2018  4:09 PM EDT -----  Regarding: RE: lab results  Please let her know her labs were normal  Her celiac labs are still pending - but we know she doesn't have this, since her biopsies were negative for this    ----- Message -----  From: Kamar Reyes MA  Sent: 3/15/2018   3:56 PM  To: Oliva Tejada MD  Subject: lab results                                      Patient called looking for her lab results she had done at [facility]  The results are in her chart under the media tap labeled CMP  Please advise!

## 2018-04-30 ENCOUNTER — ANTICOAG VISIT (OUTPATIENT)
Dept: FAMILY MEDICINE CLINIC | Facility: CLINIC | Age: 53
End: 2018-04-30

## 2018-04-30 DIAGNOSIS — I26.09 OTHER PULMONARY EMBOLISM WITH ACUTE COR PULMONALE, UNSPECIFIED CHRONICITY (HCC): Primary | ICD-10-CM

## 2018-04-30 RX ORDER — WARFARIN SODIUM 5 MG/1
10 TABLET ORAL DAILY
Qty: 180 TABLET | Refills: 0 | Status: SHIPPED | OUTPATIENT
Start: 2018-04-30 | End: 2018-08-02 | Stop reason: SDUPTHER

## 2018-04-30 NOTE — TELEPHONE ENCOUNTER
Pt notified of recommendations     ----- Message from Balta Gonzales DO sent at 4/30/2018  3:37 PM EDT -----  Pt needs recheck on inr  New rx will be sent orders will come with lab preston jiang

## 2018-04-30 NOTE — TELEPHONE ENCOUNTER
Pt called in for medication refill for her Coumadin  Pt also states that she has not been receiving any recommendation based on her INR results  The last result was received on 3/9/18 you signed off on it but no one was informed on it  Pt states hse tested 3 weeks ago and her current dosage is Warfarin 5mg 2 tab a daily

## 2018-04-30 NOTE — TELEPHONE ENCOUNTER
Spoke to [employer] at [facility] she advised pt last INR was done on 3/9 and results was 1 0, there is no current INR testing  Pt is taking 5mg 2 tab a day

## 2018-05-01 ENCOUNTER — TELEPHONE (OUTPATIENT)
Dept: FAMILY MEDICINE CLINIC | Facility: CLINIC | Age: 53
End: 2018-05-01

## 2018-05-01 DIAGNOSIS — Z79.01 LONG TERM (CURRENT) USE OF ANTICOAGULANTS: Primary | ICD-10-CM

## 2018-05-04 ENCOUNTER — ANTICOAG VISIT (OUTPATIENT)
Dept: FAMILY MEDICINE CLINIC | Facility: CLINIC | Age: 53
End: 2018-05-04

## 2018-05-04 NOTE — PROGRESS NOTES
Called pt  To see when she was going to have her INR drawn she was due on 5/2/18  She stated she will be going tomorrow to have it drawn

## 2018-05-10 ENCOUNTER — TELEPHONE (OUTPATIENT)
Dept: FAMILY MEDICINE CLINIC | Facility: CLINIC | Age: 53
End: 2018-05-10

## 2018-05-10 DIAGNOSIS — Z79.01 LONG TERM (CURRENT) USE OF ANTICOAGULANTS: Primary | ICD-10-CM

## 2018-05-10 LAB — INR PPP: 2.5 (ref 0.86–1.16)

## 2018-05-11 ENCOUNTER — TELEPHONE (OUTPATIENT)
Dept: FAMILY MEDICINE CLINIC | Facility: CLINIC | Age: 53
End: 2018-05-11

## 2018-05-11 ENCOUNTER — ANTICOAG VISIT (OUTPATIENT)
Dept: FAMILY MEDICINE CLINIC | Facility: CLINIC | Age: 53
End: 2018-05-11

## 2018-05-11 LAB
INR PPP: 2.5
PROTHROMBIN TIME: 25.8 SEC (ref 9–11.5)

## 2018-05-11 NOTE — TELEPHONE ENCOUNTER
Left detailed message with instructions will call back later to confirm she received it  Flow sheet was updated  Patient needs to stay on the same dose which is 5 Mg twice a day a total of 10 Mg and to recheck it on 6/7/2018 per Julianna Hardy

## 2018-06-21 ENCOUNTER — TELEPHONE (OUTPATIENT)
Dept: FAMILY MEDICINE CLINIC | Facility: CLINIC | Age: 53
End: 2018-06-21

## 2018-06-21 NOTE — TELEPHONE ENCOUNTER
F Y I - Pt  Was due for her INR to be drawn on 6/7/18 that she still has not gone for  I left a message for her two weeks ago and again today to make her aware she is overdue  I also checked on [employer] website to make sure it wasn't done and last listed on there was 5/10/18

## 2018-07-03 ENCOUNTER — ANTICOAG VISIT (OUTPATIENT)
Dept: FAMILY MEDICINE CLINIC | Facility: CLINIC | Age: 53
End: 2018-07-03

## 2018-07-03 ENCOUNTER — TELEPHONE (OUTPATIENT)
Dept: FAMILY MEDICINE CLINIC | Facility: CLINIC | Age: 53
End: 2018-07-03

## 2018-07-03 NOTE — TELEPHONE ENCOUNTER
Left detailed msg notifying pt that she is over due for her INR tesying  Pt was advise to call back to confirm INR testing date and confirm coumadin dose

## 2018-07-06 DIAGNOSIS — I82.4Z9 DEEP VEIN THROMBOSIS (DVT) OF DISTAL VEIN OF LOWER EXTREMITY, UNSPECIFIED CHRONICITY, UNSPECIFIED LATERALITY (HCC): Primary | ICD-10-CM

## 2018-07-06 DIAGNOSIS — Z86.718 HISTORY OF DVT (DEEP VEIN THROMBOSIS): Primary | ICD-10-CM

## 2018-07-06 NOTE — TELEPHONE ENCOUNTER
I spoke to pt, she states she will go today  Patient is upset due to her pt inr order not done correctly  Patient wants to speak to , I referred to juan since alina is out of the office

## 2018-07-06 NOTE — TELEPHONE ENCOUNTER
Spoke with pt  And she apologized for being frustrated with [facility] she said she knows it wasn't her fault and she is frustrated with the system  I made her aware that I put in a standing order and chose [facility] as the lab so they can see it is a standing order  I confirmed with her I also went to [employer] website and put in the standing order there also and she should not have any further problems where they are asking her for a new script every time  This order will not  for 6mos (2019)  I asked her to call us if she has any further problems with [facility] not seeing it as a standing order

## 2018-07-12 ENCOUNTER — ANTICOAG VISIT (OUTPATIENT)
Dept: FAMILY MEDICINE CLINIC | Facility: CLINIC | Age: 53
End: 2018-07-12

## 2018-07-13 ENCOUNTER — TELEPHONE (OUTPATIENT)
Dept: FAMILY MEDICINE CLINIC | Facility: CLINIC | Age: 53
End: 2018-07-13

## 2018-07-18 NOTE — TELEPHONE ENCOUNTER
Would you like Monisha Echevarria to move forward with discharge letter due to non compliance of INR  Still has not gone and hasn't returned our calls

## 2018-07-18 NOTE — TELEPHONE ENCOUNTER
Yes -letter needs to be sent certified and  needs to include that non compliance with coumadin is too much of a liabilty for our office to continue rendering medical care

## 2018-07-26 DIAGNOSIS — I26.09 OTHER PULMONARY EMBOLISM WITH ACUTE COR PULMONALE, UNSPECIFIED CHRONICITY (HCC): ICD-10-CM

## 2018-07-26 LAB
INR PPP: 1
INR PPP: 1 (ref 0.86–1.17)
PROTHROMBIN TIME: 10.7 SEC (ref 9–11.5)

## 2018-07-26 RX ORDER — WARFARIN SODIUM 5 MG/1
TABLET ORAL
Qty: 180 TABLET | Refills: 0 | OUTPATIENT
Start: 2018-07-26

## 2018-07-26 RX ORDER — LISINOPRIL 10 MG/1
TABLET ORAL
Qty: 90 TABLET | Refills: 3 | OUTPATIENT
Start: 2018-07-26

## 2018-07-26 NOTE — TELEPHONE ENCOUNTER
Check with Chuck Clement but technically we are obligated to give care for 30 days until  [facility] new provider and unsure if pt actually received letter

## 2018-07-26 NOTE — TELEPHONE ENCOUNTER
Pt 's INR was 1 0 per Dr Pranay Berry needs to alt 15mg with 10mg and repeat test on Monday  Left detailed message on pt  Voicemail and asked her to c/b tomorrow to confirm receipt of the message  Calendar updated

## 2018-07-26 NOTE — TELEPHONE ENCOUNTER
I called patient and informed her that she needs to make an appt, patient states she is very upset, she states she got her blood work done today  She states we called her multiple times and she did not  due to her being irritated with the office  She states that she was not compliance with the office but she states we were too, and that at one point we gave her a wrong slip for her to get a INR done  She states that she is not happy with the staff and , and st man overall  She states she is busy at the moment but she will call to make an appt, but she wants to find a different provider  She states we are trying to ruin her patient relationship to provider, and she has been coming here for 28 years

## 2018-07-26 NOTE — TELEPHONE ENCOUNTER
I called and left a message for the patient to talk through her concerns/issues  I gave her my direct line [phone]

## 2018-07-26 NOTE — TELEPHONE ENCOUNTER
Called patient and left detailed message that in order for Dr Yovany Pereira to continue with refills-she will need an OV  I asked that she call in and make an appt

## 2018-07-27 NOTE — TELEPHONE ENCOUNTER
Chantale Booker, please call the patient and update her with the information Estefania wrote in her last message

## 2018-07-30 ENCOUNTER — TELEPHONE (OUTPATIENT)
Dept: FAMILY MEDICINE CLINIC | Facility: CLINIC | Age: 53
End: 2018-07-30

## 2018-07-30 NOTE — TELEPHONE ENCOUNTER
Estefania called back to make an appt for this week  She is available on Thursday, as of now Dr Cyndie Goldberg has a same day at [id number]  I asked Estefania to call first thing Thursday at 730 so we can get her in at 930a (same day appt), if that gets taken please check with Dr Cyndie Goldberg to double book an appt for that day

## 2018-08-02 ENCOUNTER — OFFICE VISIT (OUTPATIENT)
Dept: FAMILY MEDICINE CLINIC | Facility: CLINIC | Age: 53
End: 2018-08-02
Payer: COMMERCIAL

## 2018-08-02 VITALS
TEMPERATURE: 97.3 F | HEIGHT: 66 IN | WEIGHT: 199 LBS | DIASTOLIC BLOOD PRESSURE: 66 MMHG | HEART RATE: 76 BPM | SYSTOLIC BLOOD PRESSURE: 98 MMHG | OXYGEN SATURATION: 99 % | BODY MASS INDEX: 31.98 KG/M2 | RESPIRATION RATE: 18 BRPM

## 2018-08-02 DIAGNOSIS — E55.9 VITAMIN D DEFICIENCY: ICD-10-CM

## 2018-08-02 DIAGNOSIS — D50.9 IRON DEFICIENCY ANEMIA, UNSPECIFIED IRON DEFICIENCY ANEMIA TYPE: ICD-10-CM

## 2018-08-02 DIAGNOSIS — I26.09 OTHER PULMONARY EMBOLISM WITH ACUTE COR PULMONALE, UNSPECIFIED CHRONICITY (HCC): ICD-10-CM

## 2018-08-02 DIAGNOSIS — D68.51 FACTOR V LEIDEN (HCC): ICD-10-CM

## 2018-08-02 DIAGNOSIS — I27.82 OTHER CHRONIC PULMONARY EMBOLISM WITHOUT ACUTE COR PULMONALE (HCC): Primary | ICD-10-CM

## 2018-08-02 DIAGNOSIS — J30.9 ALLERGIC RHINITIS, UNSPECIFIED SEASONALITY, UNSPECIFIED TRIGGER: Primary | ICD-10-CM

## 2018-08-02 PROCEDURE — 99213 OFFICE O/P EST LOW 20 MIN: CPT | Performed by: FAMILY MEDICINE

## 2018-08-02 PROCEDURE — 3008F BODY MASS INDEX DOCD: CPT | Performed by: FAMILY MEDICINE

## 2018-08-02 RX ORDER — WARFARIN SODIUM 5 MG/1
10 TABLET ORAL DAILY
Qty: 180 TABLET | Refills: 1 | Status: SHIPPED | OUTPATIENT
Start: 2018-08-02 | End: 2019-03-23 | Stop reason: SDUPTHER

## 2018-08-02 RX ORDER — MONTELUKAST SODIUM 10 MG/1
10 TABLET ORAL
Qty: 90 TABLET | Refills: 1 | Status: SHIPPED | OUTPATIENT
Start: 2018-08-02 | End: 2021-11-12

## 2018-08-02 NOTE — PROGRESS NOTES
Assessment/Plan:    Vitamin D deficiency  Await lab    Anemia  Await lab    Chronic pulmonary embolism (HCC)  Same dose, recheck next week       Diagnoses and all orders for this visit:    Other chronic pulmonary embolism without acute cor pulmonale (HCC)    Factor V Leiden ([address] )    Iron deficiency anemia, unspecified iron deficiency anemia type          Subjective:      Patient ID: Shani VASQUEZ Friday is a 50 y o  female  F/u hypertension-not sure if needs lisinopril anymore, had missed two doses of warfarin before last lab        The following portions of the patient's history were reviewed and updated as appropriate: allergies, current medications, past family history, past medical history, past social history, past surgical history and problem list       Review of Systems   Constitutional: Positive for fatigue  Negative for activity change and appetite change  Respiratory: Negative for shortness of breath  Cardiovascular: Negative for chest pain  Gastrointestinal: Negative for abdominal pain  Musculoskeletal: Negative for arthralgias  Neurological: Negative for headaches  Hematological: Negative for adenopathy  Objective:      BP 98/66 (BP Location: Right arm, Patient Position: Sitting, Cuff Size: Standard)   Pulse 76   Temp (!) 97 3 °F (36 3 °C) (Temporal)   Resp 18   Ht 5' 6" (1 676 m)   Wt 90 3 kg (199 lb)   SpO2 99%   BMI 32 12 kg/m²          Physical Exam   Constitutional: She appears well-developed and well-nourished  Neck: No thyromegaly present  Cardiovascular: Normal rate, regular rhythm and normal heart sounds  Musculoskeletal: She exhibits no edema  Lymphadenopathy:     She has no cervical adenopathy  Psychiatric: She has a normal mood and affect  Vitals reviewed

## 2018-08-03 LAB — INR PPP: 1.8 (ref 0.86–1.17)

## 2018-08-05 DIAGNOSIS — D64.9 ANEMIA, UNSPECIFIED TYPE: Primary | ICD-10-CM

## 2018-08-06 ENCOUNTER — TELEPHONE (OUTPATIENT)
Dept: FAMILY MEDICINE CLINIC | Facility: CLINIC | Age: 53
End: 2018-08-06

## 2018-08-06 ENCOUNTER — ANTICOAG VISIT (OUTPATIENT)
Dept: FAMILY MEDICINE CLINIC | Facility: CLINIC | Age: 53
End: 2018-08-06

## 2018-08-06 NOTE — TELEPHONE ENCOUNTER
----- Message from Gisel Walker MD sent at 8/6/2018  2:28 PM EDT -----  B12 and D slightly low-what doses of these vitamins is pt taking?

## 2018-08-06 NOTE — TELEPHONE ENCOUNTER
Patient states she does not have the bottles with her she is out of town, but she will continue to take what she is taking and give us a call when she gets back into town

## 2018-08-06 NOTE — TELEPHONE ENCOUNTER
----- Message from Phani Merlos MD sent at 8/5/2018  5:51 PM EDT -----  INR improved,same dose, iron quite low and pt anemic-can she handle oral iron-if so needs 325 mg twice per day, PT/INR ,iron,CBC  4 weeks

## 2018-08-10 NOTE — TELEPHONE ENCOUNTER
Patient is taking 200 of the vitamin d,and she has not been taking her b12  Please advise? She takes she will try drops b12 and the vitamin d because it absorbs faster  D12 and B12 what do you want her to take as far as the mg? And as the iron pills she will start taking what you recommended

## 2018-08-15 NOTE — TELEPHONE ENCOUNTER
Pt started the Iron supplement and she is taking the vitamin D and B12 as liquid for quicker absorption she just wanted to make you aware

## 2018-08-15 NOTE — TELEPHONE ENCOUNTER
Left detailed message with recommendations of what dosage the supplements should be asked her to c/b with further questions

## 2018-08-29 DIAGNOSIS — J31.0 RHINITIS, UNSPECIFIED TYPE: Primary | ICD-10-CM

## 2018-08-29 DIAGNOSIS — K21.9 GASTROESOPHAGEAL REFLUX DISEASE WITHOUT ESOPHAGITIS: Primary | ICD-10-CM

## 2018-08-29 RX ORDER — OMEPRAZOLE 20 MG/1
CAPSULE, DELAYED RELEASE ORAL
Qty: 90 CAPSULE | Refills: 3 | Status: SHIPPED | OUTPATIENT
Start: 2018-08-29 | End: 2019-03-18 | Stop reason: CLARIF

## 2018-08-29 RX ORDER — FLUTICASONE PROPIONATE 50 MCG
SPRAY, SUSPENSION (ML) NASAL
Qty: 1 BOTTLE | Refills: 3 | Status: SHIPPED | OUTPATIENT
Start: 2018-08-29 | End: 2021-11-12

## 2018-09-04 ENCOUNTER — TELEPHONE (OUTPATIENT)
Dept: FAMILY MEDICINE CLINIC | Facility: CLINIC | Age: 53
End: 2018-09-04

## 2018-09-04 LAB — INR PPP: 3.1 (ref 0.86–1.17)

## 2018-09-04 NOTE — TELEPHONE ENCOUNTER
Pt called in and asked if she can check her INR for today due to excessive bruises,she normally checks 1 time a month please advise

## 2018-09-05 ENCOUNTER — ANTICOAG VISIT (OUTPATIENT)
Dept: FAMILY MEDICINE CLINIC | Facility: CLINIC | Age: 53
End: 2018-09-05

## 2018-09-05 ENCOUNTER — TELEPHONE (OUTPATIENT)
Dept: FAMILY MEDICINE CLINIC | Facility: CLINIC | Age: 53
End: 2018-09-05

## 2018-09-05 LAB
INR PPP: 3.1
PROTHROMBIN TIME: 31.9 SEC (ref 9–11.5)

## 2018-09-25 ENCOUNTER — TELEPHONE (OUTPATIENT)
Dept: FAMILY MEDICINE CLINIC | Facility: CLINIC | Age: 53
End: 2018-09-25

## 2018-09-25 NOTE — TELEPHONE ENCOUNTER
Pt called advising she need an insurance referral for her dentist appointment scheduled for Thursday  Pt also advises she will be faxing over a form that needs to be completed by provider  Pt insurance referral generated for YONI [id number] Dx K08 9 faxed to [phone]

## 2018-10-01 ENCOUNTER — TELEPHONE (OUTPATIENT)
Dept: FAMILY MEDICINE CLINIC | Facility: CLINIC | Age: 53
End: 2018-10-01

## 2018-10-01 NOTE — TELEPHONE ENCOUNTER
I called pt to let her know that the [place] [facility] form was completed and faxed  She also needs forms filled out for her dentist (not oral surgeon)  Pt was extremely upset that she has spoken to numerous staff in the office and this still hasn't been addressed  I see no calls documented in the enocounters  She is on blood thinners and feels that "her care is being neglected"  I apologized and told her I would personally handle it myself  She will call me tomorrow in the AM to get her forms sent over to us, I will have the provider fill out ASAP and faxed over to her dentist appt that is scheduled for tomorrow at 3:30pm  I will address this matter with my practice administrator as well

## 2018-10-01 NOTE — TELEPHONE ENCOUNTER
Pt called asking if her form is completed for [facility]  Dr Clayton Saint did have it completed but she did not answer the question if pt  Can stop coumadin prior to surgery and if so for how many days  I put the paper on her desk to complete  Needs to be faxed once done and pt would like c/b with the answer and for how many days  She would also like to know when form faxed

## 2018-10-02 NOTE — TELEPHONE ENCOUNTER
I called pt and left a detailed message on machine asking her to give us a call back with her dentist info and the forms that need to be filled out for them (Not Oral Surgeon paperwork)  I told her they can be emailed and filled out since her Dentist appt is at 3:30pm this afternoon  I also left a message asking her if she went to have her INR done since she is due

## 2018-10-03 LAB — INR PPP: 2.3 (ref 0.86–1.17)

## 2018-10-03 NOTE — TELEPHONE ENCOUNTER
Pt returned my call and stated her dentist had the paperwork they needed and if they needed additional info she would give us a call  She goes to Dr Osbaldo Blanco and their phone # is [phone], She stated she will go for her INR draw today 10/03 because she didn't realize she was due

## 2018-10-04 ENCOUNTER — ANTICOAG VISIT (OUTPATIENT)
Dept: FAMILY MEDICINE CLINIC | Facility: CLINIC | Age: 53
End: 2018-10-04

## 2018-10-04 ENCOUNTER — TELEPHONE (OUTPATIENT)
Dept: FAMILY MEDICINE CLINIC | Facility: CLINIC | Age: 53
End: 2018-10-04

## 2018-10-04 LAB
INR PPP: 2.3
PROTHROMBIN TIME: 23.4 SEC (ref 9–11.5)

## 2018-10-04 NOTE — TELEPHONE ENCOUNTER
Pt  Called back stating she received confirmation of message but her question was not answered: Pt's oral surgery is next Thursday @ 9:30 she needs to know date she can stop her coumadin  She said she usually runs around 3 1 so 2 3 is a little low so she had some concerns

## 2018-10-04 NOTE — TELEPHONE ENCOUNTER
Left detailed msg notifying pt of results and recommendations  Pt was advise to call back to confirm recipt of instructions  Pt flow sheet updated

## 2018-10-11 NOTE — TELEPHONE ENCOUNTER
Pt called in and asked when she can start her Warfin medication, dr Anastasia Lindsey did give the okay for dr Anastasia Lindsey to start warfin medication today

## 2018-11-01 LAB — INR PPP: 1.2 (ref 0.86–1.17)

## 2018-11-02 ENCOUNTER — TELEPHONE (OUTPATIENT)
Dept: FAMILY MEDICINE CLINIC | Facility: CLINIC | Age: 53
End: 2018-11-02

## 2018-11-02 ENCOUNTER — ANTICOAG VISIT (OUTPATIENT)
Dept: FAMILY MEDICINE CLINIC | Facility: CLINIC | Age: 53
End: 2018-11-02

## 2018-11-02 LAB
INR PPP: 1.2
PROTHROMBIN TIME: 11.9 SEC (ref 9–11.5)

## 2018-11-05 ENCOUNTER — OFFICE VISIT (OUTPATIENT)
Dept: FAMILY MEDICINE CLINIC | Facility: CLINIC | Age: 53
End: 2018-11-05
Payer: COMMERCIAL

## 2018-11-05 VITALS
DIASTOLIC BLOOD PRESSURE: 78 MMHG | HEART RATE: 60 BPM | SYSTOLIC BLOOD PRESSURE: 120 MMHG | TEMPERATURE: 97 F | RESPIRATION RATE: 16 BRPM

## 2018-11-05 DIAGNOSIS — N39.0 URINARY TRACT INFECTION WITHOUT HEMATURIA, SITE UNSPECIFIED: ICD-10-CM

## 2018-11-05 DIAGNOSIS — M54.41 ACUTE RIGHT-SIDED LOW BACK PAIN WITH RIGHT-SIDED SCIATICA: Primary | ICD-10-CM

## 2018-11-05 LAB
SL AMB  POCT GLUCOSE, UA: NORMAL
SL AMB LEUKOCYTE ESTERASE,UA: NORMAL
SL AMB POCT BILIRUBIN,UA: NORMAL
SL AMB POCT BLOOD,UA: NORMAL
SL AMB POCT CLARITY,UA: CLEAR
SL AMB POCT COLOR,UA: YELLOW
SL AMB POCT KETONES,UA: NORMAL
SL AMB POCT NITRITE,UA: NORMAL
SL AMB POCT PH,UA: 5
SL AMB POCT SPECIFIC GRAVITY,UA: 1.01
SL AMB POCT URINE PROTEIN: 15
SL AMB POCT UROBILINOGEN: 0.2

## 2018-11-05 PROCEDURE — 99213 OFFICE O/P EST LOW 20 MIN: CPT | Performed by: NURSE PRACTITIONER

## 2018-11-05 PROCEDURE — 1036F TOBACCO NON-USER: CPT | Performed by: NURSE PRACTITIONER

## 2018-11-05 PROCEDURE — 96372 THER/PROPH/DIAG INJ SC/IM: CPT | Performed by: NURSE PRACTITIONER

## 2018-11-05 PROCEDURE — 81002 URINALYSIS NONAUTO W/O SCOPE: CPT | Performed by: NURSE PRACTITIONER

## 2018-11-05 RX ORDER — METHYLPREDNISOLONE ACETATE 80 MG/ML
80 INJECTION, SUSPENSION INTRA-ARTICULAR; INTRALESIONAL; INTRAMUSCULAR; SOFT TISSUE ONCE
Status: COMPLETED | OUTPATIENT
Start: 2018-11-05 | End: 2018-11-05

## 2018-11-05 RX ORDER — CYCLOBENZAPRINE HCL 10 MG
10 TABLET ORAL 3 TIMES DAILY PRN
Qty: 30 TABLET | Refills: 0 | Status: SHIPPED | OUTPATIENT
Start: 2018-11-05 | End: 2019-03-18 | Stop reason: SDUPTHER

## 2018-11-05 RX ORDER — FERROUS SULFATE 325(65) MG
600 TABLET ORAL
COMMUNITY

## 2018-11-05 RX ORDER — HYDROCODONE BITARTRATE AND ACETAMINOPHEN 5; 325 MG/1; MG/1
1 TABLET ORAL EVERY 6 HOURS PRN
Refills: 0 | COMMUNITY
Start: 2018-10-11 | End: 2019-03-18 | Stop reason: SDUPTHER

## 2018-11-05 RX ORDER — GABAPENTIN 100 MG/1
100 CAPSULE ORAL 3 TIMES DAILY
Qty: 30 CAPSULE | Refills: 0 | Status: SHIPPED | OUTPATIENT
Start: 2018-11-05 | End: 2019-03-18 | Stop reason: CLARIF

## 2018-11-05 RX ADMIN — METHYLPREDNISOLONE ACETATE 80 MG: 80 INJECTION, SUSPENSION INTRA-ARTICULAR; INTRALESIONAL; INTRAMUSCULAR; SOFT TISSUE at 14:17

## 2018-11-05 NOTE — PATIENT INSTRUCTIONS
Lower Back Exercises   AMBULATORY CARE:   Lower back exercises  help heal and strengthen your back muscles to prevent another injury  Ask your healthcare provider if you need to see a physical therapist for more advanced exercises  Seek care immediately if:   · You have severe pain that prevents you from moving  Contact your healthcare provider if:   · Your pain becomes worse  · You have new pain  · You have questions or concerns about your condition or care  Do lower back exercises safely:   · Do the exercises on a mat or firm surface  (not on a bed) to support your spine and prevent low back pain  · Move slowly and smoothly  Avoid fast or jerky motions  · Breathe normally  Do not hold your breath  · Stop if you feel pain  It is normal to feel some discomfort at first  Regular exercise will help decrease your discomfort over time  Lower back exercises: Your healthcare provider may recommend that you do back exercises 10 to 30 minutes each day  He may also recommend that you do exercises 1 to 3 times each day  Ask your healthcare provider which exercises are best for you and how often to do them  · Ankle pumps:  Lie on your back  Move your foot up (with your toes pointing toward your head)  Then, move your foot down (with your toes pointing away from you)  Repeat this exercise 10 times on each side  · Heel slides:  Lie on your back  Slowly bend one leg and then straighten it  Next, bend the other leg and then straighten it  Repeat 10 times on each side  · Pelvic tilt:  Lie on your back with your knees bent and feet flat on the floor  Place your arms in a relaxed position beside your body  Tighten the muscles of your abdomen and flatten your back against the floor  Hold for 5 seconds  Repeat 5 times  · Back stretch:  Lie on your back with your hands behind your head  Bend your knees and turn the lower half of your body to one side   Hold this position for 10 seconds  Repeat 3 times on each side  · Straight leg raises:  Lie on your back with one leg straight  Bend the other knee  Tighten your abdomen and then slowly lift the straight leg up about 6 to 12 inches off the floor  Hold for 1 to 5 seconds  Lower your leg slowly  Repeat 10 times on each leg  · Knee-to-chest:  Lie on your back with your knees bent and feet flat on the floor  Pull one of your knees toward your chest and hold it there for 5 seconds  Return your leg to the starting position  Lift the other knee toward your chest and hold for 5 seconds  Do this 5 times on each side  · Cat and camel:  Place your hands and knees on the floor  Arch your back upward toward the ceiling and lower your head  Round out your spine as much as you can  Hold for 5 seconds  Lift your head upward and push your chest downward toward the floor  Hold for 5 seconds  Do 3 sets or as directed  · Wall squats:  Stand with your back against a wall  Tighten the muscles of your abdomen  Slowly lower your body until your knees are bent at a 45 degree angle  Hold this position for 5 seconds  Slowly move back up to a standing position  Repeat 10 times  · Curl up:  Lie on your back with your knees bent and feet flat on the floor  Place your hands, palms down, underneath the curve in your lower back  Next, with your elbows on the floor, lift your shoulders and chest 2 to 3 inches  Keep your head in line with your shoulders  Hold this position for 5 seconds  When you can do this exercise without pain for 10 to 15 seconds, you may add a rotation  While your shoulders and chest are lifted off the ground, turn slightly to the left and hold  Repeat on the other side  · Bird dog:  Place your hands and knees on the floor  Keep your wrists directly below your shoulders and your knees directly below your hips  Pull your belly button in toward your spine  Do not flatten or arch your back   Tighten your abdominal muscles  Raise one arm straight out so that it is aligned with your head  Next, raise the leg opposite your arm  Hold this position for 15 seconds  Lower your arm and leg slowly and change sides  Do 5 sets  © 2017 [employer] Information is for End User's use only and may not be sold, redistributed or otherwise used for commercial purposes  All illustrations and images included in CareNotes® are the copyrighted property of A D A M [employer] Inc  or [employer]  The above information is an  only  It is not intended as medical advice for individual conditions or treatments  Talk to your doctor, nurse or pharmacist before following any medical regimen to see if it is safe and effective for you

## 2018-11-05 NOTE — PROGRESS NOTES
Chief Complaint   Patient presents with    Sciatica     Patient states sciatica pain flare up    Urinary Tract Infection     Pt states incomplete emptying       Assessment/Plan:     Diagnoses and all orders for this visit:    Acute right-sided low back pain with right-sided sciatica  -     gabapentin (NEURONTIN) 100 mg capsule; Take 1 capsule (100 mg total) by mouth 3 (three) times a day  -     methylPREDNISolone acetate (DEPO-MEDROL) injection 80 mg; Inject 1 mL (80 mg total) into a muscle once   -     cyclobenzaprine (FLEXERIL) 10 mg tablet; Take 1 tablet (10 mg total) by mouth 3 (three) times a day as needed for muscle spasms    Urinary tract infection without hematuria, site unspecified  -     POCT urine dip    Other orders  -     HYDROcodone-acetaminophen (NORCO) 5-325 mg per tablet; Take 1 tablet by mouth every 6 (six) hours as needed for pain  -     ferrous sulfate 325 (65 Fe) mg tablet; Take 600 mg by mouth daily with breakfast          Urinalysis is normal  Low suspicion for uti or kidney stone  Subjective:      Patient ID: Christen Gregory is a 50 y o  female  slepts on a different bed and been reaching for her dog  Patient has been taking norco which has been helping  Urinary Tract Infection    This is a new problem  The current episode started yesterday  The problem occurs intermittently  Quality: incomplete emptying feeling  There has been no fever  Pertinent negatives include no chills or hematuria  She has tried nothing for the symptoms  Hip Pain    The incident occurred at home  Injury mechanism: sat in car all day after driving from [place]  The pain is present in the right thigh, right hip and right leg  The quality of the pain is described as cramping, burning and shooting  The symptoms are aggravated by movement (hard time gettign out of bed  )  She has tried nothing for the symptoms  The treatment provided no relief         The following portions of the patient's history were reviewed and updated as appropriate: allergies, current medications, past family history, past medical history, past social history, past surgical history and problem list     Review of Systems   Constitutional: Negative for chills, diaphoresis and fever  Gastrointestinal: Negative for abdominal pain  Genitourinary: Positive for difficulty urinating  Negative for hematuria, vaginal discharge and vaginal pain  Musculoskeletal: Positive for back pain and gait problem  Getting out of bed  Sitting forward feels better sitting back is worse  Objective:      /78 (BP Location: Right arm, Patient Position: Sitting, Cuff Size: Adult)   Pulse 60   Temp (!) 97 °F (36 1 °C) (Temporal)   Resp 16   LMP 10/22/2018 (Approximate)          Physical Exam   Constitutional: She is oriented to person, place, and time  She appears well-developed and well-nourished  HENT:   Head: Normocephalic and atraumatic  Cardiovascular: Normal rate, regular rhythm and normal heart sounds  Pulmonary/Chest: Effort normal and breath sounds normal    Musculoskeletal:        Lumbar back: She exhibits decreased range of motion and pain  Back:    Neurological: She is alert and oriented to person, place, and time  Nursing note and vitals reviewed

## 2018-11-05 NOTE — LETTER
November 5, 2018     Patient: Shadi Jeffrey   YOB: 1965   Date of Visit: 11/5/2018       To Whom it May Concern:    Poppy Montiel is under my professional care  She was seen in my office on 11/5/2018  She may return to work on 11/7/2018  If you have any questions or concerns, please don't hesitate to call           Sincerely,          EZEQUIEL Early        CC: No Recipients

## 2018-11-08 ENCOUNTER — TELEPHONE (OUTPATIENT)
Dept: FAMILY MEDICINE CLINIC | Facility: CLINIC | Age: 53
End: 2018-11-08

## 2018-11-09 ENCOUNTER — ANTICOAG VISIT (OUTPATIENT)
Dept: FAMILY MEDICINE CLINIC | Facility: CLINIC | Age: 53
End: 2018-11-09

## 2018-11-09 LAB
INR PPP: 1.4
PROTHROMBIN TIME: 14.3 SEC (ref 9–11.5)

## 2018-11-09 NOTE — TELEPHONE ENCOUNTER
As per pt currently at lab having her INR test done  Will follow up with lab for results midday today

## 2018-11-10 LAB — INR PPP: 1.4 (ref 0.86–1.17)

## 2018-11-12 ENCOUNTER — TELEPHONE (OUTPATIENT)
Dept: FAMILY MEDICINE CLINIC | Facility: CLINIC | Age: 53
End: 2018-11-12

## 2018-11-12 ENCOUNTER — ANTICOAG VISIT (OUTPATIENT)
Dept: FAMILY MEDICINE CLINIC | Facility: CLINIC | Age: 53
End: 2018-11-12

## 2018-11-19 ENCOUNTER — ANTICOAG VISIT (OUTPATIENT)
Dept: FAMILY MEDICINE CLINIC | Facility: CLINIC | Age: 53
End: 2018-11-19

## 2018-11-19 ENCOUNTER — TELEPHONE (OUTPATIENT)
Dept: FAMILY MEDICINE CLINIC | Facility: CLINIC | Age: 53
End: 2018-11-19

## 2018-11-26 ENCOUNTER — ANTICOAG VISIT (OUTPATIENT)
Dept: FAMILY MEDICINE CLINIC | Facility: CLINIC | Age: 53
End: 2018-11-26

## 2018-11-26 NOTE — TELEPHONE ENCOUNTER
I called Estefania to verify her coumadin dosage, in the INR book it states that she is alternating 10mg & 15mg but in epic it states 15mg daily  She didn't answer so I left a message on her voicemail to return our call

## 2018-11-27 ENCOUNTER — ANTICOAG VISIT (OUTPATIENT)
Dept: FAMILY MEDICINE CLINIC | Facility: CLINIC | Age: 53
End: 2018-11-27

## 2018-11-27 ENCOUNTER — TELEPHONE (OUTPATIENT)
Dept: FAMILY MEDICINE CLINIC | Facility: CLINIC | Age: 53
End: 2018-11-27

## 2018-11-27 NOTE — TELEPHONE ENCOUNTER
----- Message from Ludwig Alvarado MD sent at 11/26/2018 11:35 PM EST -----  PT ok same dose, PT  4 weeks

## 2018-11-27 NOTE — PROGRESS NOTES
Pt called confirming dose on her coumadin, pt states she is taking 15mg as per Dr Barb Archibald instructions  Please advise

## 2018-12-21 LAB
INR PPP: 2
PROTHROMBIN TIME: 20.8 SEC (ref 9–11.5)

## 2018-12-24 ENCOUNTER — ANTICOAG VISIT (OUTPATIENT)
Dept: FAMILY MEDICINE CLINIC | Facility: CLINIC | Age: 53
End: 2018-12-24

## 2018-12-24 ENCOUNTER — TELEPHONE (OUTPATIENT)
Dept: FAMILY MEDICINE CLINIC | Facility: CLINIC | Age: 53
End: 2018-12-24

## 2018-12-24 NOTE — TELEPHONE ENCOUNTER
----- Message from Braden Gabriel MD sent at 12/23/2018  8:11 PM EST -----  PT okay,same dose, PT 4 weeks

## 2018-12-24 NOTE — TELEPHONE ENCOUNTER
----- Message from Andrae Carey MD sent at 12/23/2018  8:11 PM EST -----  PT okay,same dose, PT 4 weeks

## 2018-12-24 NOTE — TELEPHONE ENCOUNTER
Left detailed message advising pt of results and recommendations  Pt flow sheet updated  Pt was advise to call back to confirm receipt of recommendations

## 2019-01-15 ENCOUNTER — TELEPHONE (OUTPATIENT)
Dept: FAMILY MEDICINE CLINIC | Facility: CLINIC | Age: 54
End: 2019-01-15

## 2019-01-21 ENCOUNTER — ANTICOAG VISIT (OUTPATIENT)
Dept: FAMILY MEDICINE CLINIC | Facility: CLINIC | Age: 54
End: 2019-01-21

## 2019-01-25 ENCOUNTER — ANTICOAG VISIT (OUTPATIENT)
Dept: FAMILY MEDICINE CLINIC | Facility: CLINIC | Age: 54
End: 2019-01-25

## 2019-01-29 ENCOUNTER — IMMUNIZATIONS (OUTPATIENT)
Dept: FAMILY MEDICINE CLINIC | Facility: CLINIC | Age: 54
End: 2019-01-29
Payer: COMMERCIAL

## 2019-01-29 DIAGNOSIS — Z23 INFLUENZA VACCINE NEEDED: Primary | ICD-10-CM

## 2019-01-29 PROCEDURE — 90471 IMMUNIZATION ADMIN: CPT | Performed by: NURSE PRACTITIONER

## 2019-01-29 PROCEDURE — 90682 RIV4 VACC RECOMBINANT DNA IM: CPT | Performed by: NURSE PRACTITIONER

## 2019-02-04 ENCOUNTER — ANTICOAG VISIT (OUTPATIENT)
Dept: FAMILY MEDICINE CLINIC | Facility: CLINIC | Age: 54
End: 2019-02-04

## 2019-02-04 ENCOUNTER — TELEPHONE (OUTPATIENT)
Dept: FAMILY MEDICINE CLINIC | Facility: CLINIC | Age: 54
End: 2019-02-04

## 2019-02-05 ENCOUNTER — ANTICOAG VISIT (OUTPATIENT)
Dept: FAMILY MEDICINE CLINIC | Facility: CLINIC | Age: 54
End: 2019-02-05

## 2019-02-05 ENCOUNTER — TELEPHONE (OUTPATIENT)
Dept: FAMILY MEDICINE CLINIC | Facility: CLINIC | Age: 54
End: 2019-02-05

## 2019-02-05 NOTE — TELEPHONE ENCOUNTER
As per Dr Hernesto Peña instruction pt was advised to take an additional 1/2 tab the next two days Wed and Thursday and resumed 10mg and recheck in 2 weeks  Pt flow sheet updated

## 2019-02-18 ENCOUNTER — ANTICOAG VISIT (OUTPATIENT)
Dept: FAMILY MEDICINE CLINIC | Facility: CLINIC | Age: 54
End: 2019-02-18

## 2019-02-22 ENCOUNTER — ANTICOAG VISIT (OUTPATIENT)
Dept: FAMILY MEDICINE CLINIC | Facility: CLINIC | Age: 54
End: 2019-02-22

## 2019-03-04 ENCOUNTER — ANTICOAG VISIT (OUTPATIENT)
Dept: FAMILY MEDICINE CLINIC | Facility: CLINIC | Age: 54
End: 2019-03-04

## 2019-03-06 ENCOUNTER — TELEPHONE (OUTPATIENT)
Dept: FAMILY MEDICINE CLINIC | Facility: CLINIC | Age: 54
End: 2019-03-06

## 2019-03-08 ENCOUNTER — ANTICOAG VISIT (OUTPATIENT)
Dept: FAMILY MEDICINE CLINIC | Facility: CLINIC | Age: 54
End: 2019-03-08

## 2019-03-15 ENCOUNTER — ANTICOAG VISIT (OUTPATIENT)
Dept: FAMILY MEDICINE CLINIC | Facility: CLINIC | Age: 54
End: 2019-03-15

## 2019-03-18 ENCOUNTER — ANTICOAG VISIT (OUTPATIENT)
Dept: FAMILY MEDICINE CLINIC | Facility: CLINIC | Age: 54
End: 2019-03-18

## 2019-03-18 ENCOUNTER — TELEPHONE (OUTPATIENT)
Dept: FAMILY MEDICINE CLINIC | Facility: CLINIC | Age: 54
End: 2019-03-18

## 2019-03-18 ENCOUNTER — APPOINTMENT (OUTPATIENT)
Dept: RADIOLOGY | Facility: MEDICAL CENTER | Age: 54
End: 2019-03-18
Payer: COMMERCIAL

## 2019-03-18 ENCOUNTER — OFFICE VISIT (OUTPATIENT)
Dept: FAMILY MEDICINE CLINIC | Facility: CLINIC | Age: 54
End: 2019-03-18
Payer: COMMERCIAL

## 2019-03-18 VITALS
SYSTOLIC BLOOD PRESSURE: 150 MMHG | HEART RATE: 64 BPM | BODY MASS INDEX: 32.12 KG/M2 | RESPIRATION RATE: 16 BRPM | TEMPERATURE: 98.1 F | DIASTOLIC BLOOD PRESSURE: 92 MMHG | HEIGHT: 66 IN

## 2019-03-18 DIAGNOSIS — M62.838 CERVICAL PARASPINOUS MUSCLE SPASM: Primary | ICD-10-CM

## 2019-03-18 DIAGNOSIS — R03.0 ELEVATED BP WITHOUT DIAGNOSIS OF HYPERTENSION: Primary | ICD-10-CM

## 2019-03-18 DIAGNOSIS — E55.9 VITAMIN D DEFICIENCY: ICD-10-CM

## 2019-03-18 DIAGNOSIS — D50.9 IRON DEFICIENCY ANEMIA, UNSPECIFIED IRON DEFICIENCY ANEMIA TYPE: ICD-10-CM

## 2019-03-18 DIAGNOSIS — M62.838 CERVICAL PARASPINOUS MUSCLE SPASM: ICD-10-CM

## 2019-03-18 DIAGNOSIS — R51.9 INTRACTABLE HEADACHE, UNSPECIFIED CHRONICITY PATTERN, UNSPECIFIED HEADACHE TYPE: Primary | ICD-10-CM

## 2019-03-18 DIAGNOSIS — R03.0 ELEVATED BP WITHOUT DIAGNOSIS OF HYPERTENSION: ICD-10-CM

## 2019-03-18 PROCEDURE — 1036F TOBACCO NON-USER: CPT | Performed by: FAMILY MEDICINE

## 2019-03-18 PROCEDURE — 72050 X-RAY EXAM NECK SPINE 4/5VWS: CPT

## 2019-03-18 PROCEDURE — 99214 OFFICE O/P EST MOD 30 MIN: CPT | Performed by: FAMILY MEDICINE

## 2019-03-18 RX ORDER — HYDROCODONE BITARTRATE AND ACETAMINOPHEN 5; 325 MG/1; MG/1
1 TABLET ORAL EVERY 6 HOURS PRN
Qty: 60 TABLET | Refills: 0 | Status: SHIPPED | OUTPATIENT
Start: 2019-03-18 | End: 2020-01-14 | Stop reason: ALTCHOICE

## 2019-03-18 RX ORDER — CYCLOBENZAPRINE HCL 10 MG
10 TABLET ORAL 3 TIMES DAILY PRN
Qty: 30 TABLET | Refills: 0 | Status: SHIPPED | OUTPATIENT
Start: 2019-03-18 | End: 2020-01-14 | Stop reason: ALTCHOICE

## 2019-03-18 NOTE — TELEPHONE ENCOUNTER
----- Message from Miki Snyder MD sent at 3/18/2019  5:15 PM EDT -----  PT ok, same dose, Pt 4 weeks

## 2019-03-18 NOTE — PROGRESS NOTES
Assessment/Plan:    No problem-specific Assessment & Plan notes found for this encounter  There are no diagnoses linked to this encounter  Subjective:      Patient ID: Velvet Yoo is a 48 y o  female  Headache    This is a new problem  The current episode started 1 to 4 weeks ago  The problem occurs constantly  The problem has been gradually worsening  The pain is located in the bilateral region  The pain does not radiate  The pain quality is not similar to prior headaches  The quality of the pain is described as aching  The pain is at a severity of 4/10  The pain is moderate  Associated symptoms include muscle aches  Pertinent negatives include no coughing, dizziness or numbness  Associated symptoms comments: Decreased appetiite  The symptoms are aggravated by menstrual cycle and weather changes  She has tried acetaminophen for the symptoms  The treatment provided moderate relief  Shortness of Breath   Associated symptoms include headaches  Pertinent negatives include no chest pain or wheezing  Generalized Body Aches   Associated symptoms include headaches, shortness of breath and muscle aches  Pertinent negatives include no fatigue, chest pain, coughing or wheezing  The following portions of the patient's history were reviewed and updated as appropriate: allergies, current medications, past family history, past medical history, past social history, past surgical history and problem list     Review of Systems   Constitutional: Positive for appetite change  Negative for activity change, fatigue and unexpected weight change  Eyes: Negative for visual disturbance  Respiratory: Positive for shortness of breath  Negative for cough and wheezing  Cardiovascular: Negative for chest pain  Musculoskeletal: Positive for myalgias  Neurological: Positive for headaches  Negative for dizziness and numbness  Psychiatric/Behavioral: The patient is not nervous/anxious  Objective:      /92 (BP Location: Left arm, Patient Position: Sitting, Cuff Size: Standard)   Pulse 64   Temp 98 1 °F (36 7 °C) (Temporal)   Resp 16   Ht 5' 6" (1 676 m)   BMI 32 12 kg/m²          Physical Exam   Constitutional: She appears well-developed and well-nourished  HENT:   Head: Normocephalic and atraumatic  Neck: No thyromegaly present  Cardiovascular: Normal rate, regular rhythm and normal heart sounds  Pulmonary/Chest: Effort normal and breath sounds normal    Musculoskeletal:        Cervical back: She exhibits decreased range of motion, tenderness and spasm  Lymphadenopathy:     She has no cervical adenopathy  Vitals reviewed

## 2019-03-19 LAB
25(OH)D3 SERPL-MCNC: 19 NG/ML (ref 30–100)
ALBUMIN SERPL-MCNC: 3.9 G/DL (ref 3.6–5.1)
ALBUMIN/GLOB SERPL: 1.4 (CALC) (ref 1–2.5)
ALP SERPL-CCNC: 62 U/L (ref 33–130)
ALT SERPL-CCNC: 12 U/L (ref 6–29)
APPEARANCE UR: CLEAR
AST SERPL-CCNC: 14 U/L (ref 10–35)
BASOPHILS # BLD AUTO: 32 CELLS/UL (ref 0–200)
BASOPHILS NFR BLD AUTO: 0.5 %
BILIRUB SERPL-MCNC: 0.6 MG/DL (ref 0.2–1.2)
BILIRUB UR QL STRIP: NEGATIVE
BUN SERPL-MCNC: 13 MG/DL (ref 7–25)
BUN/CREAT SERPL: NORMAL (CALC) (ref 6–22)
CALCIUM SERPL-MCNC: 8.6 MG/DL (ref 8.6–10.4)
CHLORIDE SERPL-SCNC: 105 MMOL/L (ref 98–110)
CK SERPL-CCNC: 55 U/L (ref 29–143)
CO2 SERPL-SCNC: 28 MMOL/L (ref 20–32)
COLOR UR: YELLOW
CREAT SERPL-MCNC: 0.69 MG/DL (ref 0.5–1.05)
CRP SERPL-MCNC: 5.2 MG/L
EOSINOPHIL # BLD AUTO: 252 CELLS/UL (ref 15–500)
EOSINOPHIL NFR BLD AUTO: 4 %
ERYTHROCYTE [DISTWIDTH] IN BLOOD BY AUTOMATED COUNT: 13.6 % (ref 11–15)
ERYTHROCYTE [SEDIMENTATION RATE] IN BLOOD BY WESTERGREN METHOD: 11 MM/H
GLOBULIN SER CALC-MCNC: 2.7 G/DL (CALC) (ref 1.9–3.7)
GLUCOSE SERPL-MCNC: 90 MG/DL (ref 65–99)
GLUCOSE UR QL STRIP: NEGATIVE
HCT VFR BLD AUTO: 36.3 % (ref 35–45)
HGB BLD-MCNC: 12.1 G/DL (ref 11.7–15.5)
HGB UR QL STRIP: NEGATIVE
IRON SATN MFR SERPL: 10 % (CALC) (ref 11–50)
IRON SERPL-MCNC: 36 MCG/DL (ref 45–160)
KETONES UR QL STRIP: NEGATIVE
LEUKOCYTE ESTERASE UR QL STRIP: NEGATIVE
LYMPHOCYTES # BLD AUTO: 1890 CELLS/UL (ref 850–3900)
LYMPHOCYTES NFR BLD AUTO: 30 %
MCH RBC QN AUTO: 26.5 PG (ref 27–33)
MCHC RBC AUTO-ENTMCNC: 33.3 G/DL (ref 32–36)
MCV RBC AUTO: 79.6 FL (ref 80–100)
MONOCYTES # BLD AUTO: 391 CELLS/UL (ref 200–950)
MONOCYTES NFR BLD AUTO: 6.2 %
NEUTROPHILS # BLD AUTO: 3736 CELLS/UL (ref 1500–7800)
NEUTROPHILS NFR BLD AUTO: 59.3 %
NITRITE UR QL STRIP: NEGATIVE
PH UR STRIP: 7 [PH] (ref 5–8)
PLATELET # BLD AUTO: 227 THOUSAND/UL (ref 140–400)
PMV BLD REES-ECKER: 11.1 FL (ref 7.5–12.5)
POTASSIUM SERPL-SCNC: 4 MMOL/L (ref 3.5–5.3)
PROT SERPL-MCNC: 6.6 G/DL (ref 6.1–8.1)
PROT UR QL STRIP: NEGATIVE
RBC # BLD AUTO: 4.56 MILLION/UL (ref 3.8–5.1)
SL AMB EGFR AFRICAN AMERICAN: 117 ML/MIN/1.73M2
SL AMB EGFR NON AFRICAN AMERICAN: 101 ML/MIN/1.73M2
SODIUM SERPL-SCNC: 140 MMOL/L (ref 135–146)
SP GR UR STRIP: 1.01 (ref 1–1.03)
TIBC SERPL-MCNC: 377 MCG/DL (CALC) (ref 250–450)
TSH SERPL-ACNC: 1.5 MIU/L
WBC # BLD AUTO: 6.3 THOUSAND/UL (ref 3.8–10.8)

## 2019-03-20 ENCOUNTER — TELEPHONE (OUTPATIENT)
Dept: FAMILY MEDICINE CLINIC | Facility: CLINIC | Age: 54
End: 2019-03-20

## 2019-03-20 DIAGNOSIS — E55.9 VITAMIN D DEFICIENCY: Primary | ICD-10-CM

## 2019-03-20 NOTE — TELEPHONE ENCOUNTER
----- Message from Sally Taylor MD sent at 3/19/2019  7:03 PM EDT -----  All ok except Vitamin D too low-rec 50,000 units twice per week-30 or 90 day rx?  Repeat level 4  months

## 2019-03-20 NOTE — TELEPHONE ENCOUNTER
Pt called and was informed of labs  She said she would like a 30 day supply of the Vitamin D  Please send [facility] to [facility] [place]  She would like to know what you recommend for the headaches she discussed with you

## 2019-03-20 NOTE — TELEPHONE ENCOUNTER
----- Message from Aislinn Hardy MD sent at 3/20/2019  7:44 AM EDT -----  Some bone spurs at C3-C5,so mild arthritic changes-if still with sx rec PT eval

## 2019-03-20 NOTE — TELEPHONE ENCOUNTER
rx sent for vitamin D-are headaches any grace on muscle relaxant?  If nor rec neuro eval but wait may be long

## 2019-03-21 ENCOUNTER — TELEPHONE (OUTPATIENT)
Dept: FAMILY MEDICINE CLINIC | Facility: CLINIC | Age: 54
End: 2019-03-21

## 2019-03-21 NOTE — LETTER
March 21, 2019     Patient: Nargis Morales   YOB: 1965   Date of Visit: 03/18/2019       To Whom it May Concern:    Morris Laurent is under my professional care  She was seen in my office on 03/18/2019  She may return to work on 03/21/2019  If you have any questions or concerns, please don't hesitate to call           Sincerely,          Maye Hussein MD        CC: No Recipients

## 2019-03-21 NOTE — TELEPHONE ENCOUNTER
Patient was seen on Monday and she went back to work today  Patient will like to know if it is okay for us to give her a note  Please advice

## 2019-03-23 DIAGNOSIS — I26.09 OTHER PULMONARY EMBOLISM WITH ACUTE COR PULMONALE, UNSPECIFIED CHRONICITY (HCC): ICD-10-CM

## 2019-03-24 RX ORDER — WARFARIN SODIUM 5 MG/1
TABLET ORAL
Qty: 180 TABLET | Refills: 1 | Status: SHIPPED | OUTPATIENT
Start: 2019-03-24 | End: 2019-10-18 | Stop reason: SDUPTHER

## 2019-04-11 ENCOUNTER — ANTICOAG VISIT (OUTPATIENT)
Dept: FAMILY MEDICINE CLINIC | Facility: CLINIC | Age: 54
End: 2019-04-11

## 2019-04-12 ENCOUNTER — ANCILLARY ORDERS (OUTPATIENT)
Dept: FAMILY MEDICINE CLINIC | Facility: CLINIC | Age: 54
End: 2019-04-12

## 2019-04-12 DIAGNOSIS — I82.4Z9 DEEP VEIN THROMBOSIS (DVT) OF DISTAL VEIN OF LOWER EXTREMITY, UNSPECIFIED CHRONICITY, UNSPECIFIED LATERALITY (HCC): ICD-10-CM

## 2019-04-15 ENCOUNTER — TELEPHONE (OUTPATIENT)
Dept: FAMILY MEDICINE CLINIC | Facility: CLINIC | Age: 54
End: 2019-04-15

## 2019-04-15 LAB
INR PPP: 1.4
INR PPP: 1.4 (ref 0.86–1.17)
PROTHROMBIN TIME: 14.7 SEC (ref 9–11.5)

## 2019-04-19 ENCOUNTER — TELEPHONE (OUTPATIENT)
Dept: OTHER | Facility: OTHER | Age: 54
End: 2019-04-19

## 2019-04-19 ENCOUNTER — TELEPHONE (OUTPATIENT)
Dept: FAMILY MEDICINE CLINIC | Facility: CLINIC | Age: 54
End: 2019-04-19

## 2019-04-26 LAB — INR PPP: 1.6 (ref 0.86–1.17)

## 2019-04-29 ENCOUNTER — TELEPHONE (OUTPATIENT)
Dept: FAMILY MEDICINE CLINIC | Facility: CLINIC | Age: 54
End: 2019-04-29

## 2019-04-29 ENCOUNTER — ANTICOAG VISIT (OUTPATIENT)
Dept: FAMILY MEDICINE CLINIC | Facility: CLINIC | Age: 54
End: 2019-04-29

## 2019-06-04 LAB — INR PPP: 1.9 (ref 0.86–1.17)

## 2019-06-05 ENCOUNTER — ANTICOAG VISIT (OUTPATIENT)
Dept: FAMILY MEDICINE CLINIC | Facility: CLINIC | Age: 54
End: 2019-06-05

## 2019-06-05 ENCOUNTER — TELEPHONE (OUTPATIENT)
Dept: FAMILY MEDICINE CLINIC | Facility: CLINIC | Age: 54
End: 2019-06-05

## 2019-07-17 ENCOUNTER — ANTICOAG VISIT (OUTPATIENT)
Dept: FAMILY MEDICINE CLINIC | Facility: CLINIC | Age: 54
End: 2019-07-17

## 2019-07-17 ENCOUNTER — TELEPHONE (OUTPATIENT)
Dept: FAMILY MEDICINE CLINIC | Facility: CLINIC | Age: 54
End: 2019-07-17

## 2019-07-17 LAB — INR PPP: 1.9 (ref 0.84–1.19)

## 2019-07-17 NOTE — TELEPHONE ENCOUNTER
----- Message from Dev Mcguire MD sent at 7/16/2019  5:22 PM EDT -----  PT okay ,same dose, PT 4 weeks

## 2019-07-22 ENCOUNTER — TELEPHONE (OUTPATIENT)
Dept: FAMILY MEDICINE CLINIC | Facility: CLINIC | Age: 54
End: 2019-07-22

## 2019-07-22 NOTE — TELEPHONE ENCOUNTER
----- Message from Sabrina Ruiz MD sent at 7/16/2019  5:22 PM EDT -----  PT okay ,same dose, PT 4 weeks

## 2019-10-18 DIAGNOSIS — I26.09 OTHER PULMONARY EMBOLISM WITH ACUTE COR PULMONALE, UNSPECIFIED CHRONICITY (HCC): ICD-10-CM

## 2019-10-18 RX ORDER — WARFARIN SODIUM 5 MG/1
TABLET ORAL
Qty: 14 TABLET | Refills: 0 | Status: SHIPPED | OUTPATIENT
Start: 2019-10-18 | End: 2019-11-11 | Stop reason: SDUPTHER

## 2019-10-28 ENCOUNTER — TELEPHONE (OUTPATIENT)
Dept: FAMILY MEDICINE CLINIC | Facility: CLINIC | Age: 54
End: 2019-10-28

## 2019-10-28 DIAGNOSIS — I27.82 OTHER CHRONIC PULMONARY EMBOLISM WITHOUT ACUTE COR PULMONALE (HCC): ICD-10-CM

## 2019-10-28 DIAGNOSIS — D68.51 FACTOR V LEIDEN (HCC): Primary | ICD-10-CM

## 2019-10-28 DIAGNOSIS — Z86.718 HISTORY OF DVT (DEEP VEIN THROMBOSIS): ICD-10-CM

## 2019-10-28 NOTE — TELEPHONE ENCOUNTER
PT WOULD LIKE A B/W SCRIPT FOR HER WARFARIN PT BLOODWORK  SHE TOOK A SICK DAY TODAY AND WOULD LIKE TO GO OVER TO [place] ON [address] RIGHT AFTERWARDS SO CAN THIS PLEASE BE DONE ASAP  PLEASE CALL PT ASAP AS SHE WOULD LIKE TO GET THIS DONE TODAY AS SHE IS OFF OF WORK  PT SEES DR Navjot Bustamante AND Sarai Mandujano  THANK YOU  [facility] [address] AND HAD HER HELP ME WITH THIS ASAP AS PT WAS WAITING ON MY PHONE  [id number] B/W IN FOR THIS PT AS SHE WILL BE PICKING IT UP SHORTLY

## 2019-10-28 NOTE — PROGRESS NOTES
Pt called and needs a RX for her PT's to be done   Order placed and pt notified to come by for the RX

## 2019-10-29 ENCOUNTER — ANTICOAG VISIT (OUTPATIENT)
Dept: FAMILY MEDICINE CLINIC | Facility: CLINIC | Age: 54
End: 2019-10-29

## 2019-10-29 LAB
INR PPP: 3.6
PROTHROMBIN TIME: 33.6 SEC (ref 9–11.5)

## 2019-11-11 ENCOUNTER — ANTICOAG VISIT (OUTPATIENT)
Dept: FAMILY MEDICINE CLINIC | Facility: CLINIC | Age: 54
End: 2019-11-11

## 2019-11-11 DIAGNOSIS — I26.09 OTHER PULMONARY EMBOLISM WITH ACUTE COR PULMONALE, UNSPECIFIED CHRONICITY (HCC): ICD-10-CM

## 2019-11-11 DIAGNOSIS — E55.9 VITAMIN D DEFICIENCY: ICD-10-CM

## 2019-11-11 RX ORDER — WARFARIN SODIUM 5 MG/1
10 TABLET ORAL DAILY
Qty: 90 TABLET | Refills: 0 | Status: SHIPPED | OUTPATIENT
Start: 2019-11-11 | End: 2020-01-14 | Stop reason: SDUPTHER

## 2019-11-11 NOTE — TELEPHONE ENCOUNTER
Pt needs refills on her warfarin 5mg #90  And vitamin D[facility] [address]  Please note, this is a new phrmacy for her 
good

## 2019-12-27 ENCOUNTER — TELEPHONE (OUTPATIENT)
Dept: FAMILY MEDICINE CLINIC | Facility: CLINIC | Age: 54
End: 2019-12-27

## 2019-12-27 NOTE — TELEPHONE ENCOUNTER
Pt called stating she was originally on vitamin D3 and the last time she picked up her meds they gave her vitamin D2, pt wants to know why this was changed

## 2020-01-01 DIAGNOSIS — I26.09 OTHER PULMONARY EMBOLISM WITH ACUTE COR PULMONALE, UNSPECIFIED CHRONICITY (HCC): ICD-10-CM

## 2020-01-02 RX ORDER — WARFARIN SODIUM 5 MG/1
TABLET ORAL
Qty: 90 TABLET | Refills: 0 | OUTPATIENT
Start: 2020-01-02

## 2020-01-14 ENCOUNTER — OFFICE VISIT (OUTPATIENT)
Dept: FAMILY MEDICINE CLINIC | Facility: CLINIC | Age: 55
End: 2020-01-14
Payer: COMMERCIAL

## 2020-01-14 ENCOUNTER — ANTICOAG VISIT (OUTPATIENT)
Dept: FAMILY MEDICINE CLINIC | Facility: CLINIC | Age: 55
End: 2020-01-14

## 2020-01-14 VITALS
HEIGHT: 66 IN | WEIGHT: 215.4 LBS | RESPIRATION RATE: 16 BRPM | BODY MASS INDEX: 34.62 KG/M2 | DIASTOLIC BLOOD PRESSURE: 80 MMHG | SYSTOLIC BLOOD PRESSURE: 140 MMHG | HEART RATE: 68 BPM

## 2020-01-14 DIAGNOSIS — D68.51 FACTOR V LEIDEN (HCC): ICD-10-CM

## 2020-01-14 DIAGNOSIS — I26.09 OTHER PULMONARY EMBOLISM WITH ACUTE COR PULMONALE, UNSPECIFIED CHRONICITY (HCC): ICD-10-CM

## 2020-01-14 DIAGNOSIS — J06.9 ACUTE URI: Primary | ICD-10-CM

## 2020-01-14 PROCEDURE — 99213 OFFICE O/P EST LOW 20 MIN: CPT | Performed by: NURSE PRACTITIONER

## 2020-01-14 RX ORDER — AZITHROMYCIN 250 MG/1
TABLET, FILM COATED ORAL
Qty: 6 TABLET | Refills: 0 | Status: SHIPPED | OUTPATIENT
Start: 2020-01-14 | End: 2020-01-19

## 2020-01-14 RX ORDER — WARFARIN SODIUM 5 MG/1
10 TABLET ORAL DAILY
Qty: 90 TABLET | Refills: 0 | Status: SHIPPED | OUTPATIENT
Start: 2020-01-14 | End: 2020-02-21

## 2020-01-14 RX ORDER — ERGOCALCIFEROL 1.25 MG/1
50000 CAPSULE ORAL 2 TIMES WEEKLY
Refills: 5 | COMMUNITY
Start: 2019-11-11 | End: 2020-01-14 | Stop reason: ALTCHOICE

## 2020-01-14 NOTE — LETTER
January 14, 2020     Patient: Liz Avila   YOB: 1965   Date of Visit: 1/14/2020       To Whom it May Concern:    Lucinda Otoole is under my professional care  She was seen in my office on 1/14/2020  She may return to work on 1/16/2020- please excuse from 1/13/2020-1/16/2020  If you have any questions or concerns, please don't hesitate to call           Sincerely,          EZEQUIEL Whitehead        CC: No Recipients

## 2020-01-14 NOTE — PROGRESS NOTES
Assessment and Plan:    Problem List Items Addressed This Visit        Hematopoietic and Hemostatic    Factor V Leiden [facility])     Patient maintains on coumadin  She typically does well with  Managing  Relevant Medications    warfarin (COUMADIN) 5 mg tablet      Other Visit Diagnoses     Acute URI    -  Primary    Relevant Medications    azithromycin (ZITHROMAX) 250 mg tablet    Other pulmonary embolism with acute cor pulmonale, unspecified chronicity (HCC)        Relevant Medications    warfarin (COUMADIN) 5 mg tablet                 Diagnoses and all orders for this visit:    Acute URI  -     azithromycin (ZITHROMAX) 250 mg tablet; Take 2 tablets today then 1 tablet daily x 4 days    Other pulmonary embolism with acute cor pulmonale, unspecified chronicity (HCC)  -     warfarin (COUMADIN) 5 mg tablet; Take 2 tablets (10 mg total) by mouth daily Or as directed by physician    [facility]    Other orders  -     Discontinue: ergocalciferol (VITAMIN D2) 50,000 units; Take 50,000 Units by mouth 2 (two) times a week  -     esomeprazole (NexIUM) 20 mg capsule; Take 20 mg by mouth every morning before breakfast          Patient will take 3 tabs coumadin, today then 2 tabs then 3 tabs  Subjective:      Patient ID: Liz Avila is a 48 y o  female  CC:    Chief Complaint   Patient presents with    Nasal Congestion     pt here with headache, congestions, vomiting, but stopped now, and slight cough x 3 days  R Wojciech       HPI:    URI    This is a new problem  The current episode started in the past 7 days  The problem has been unchanged  There has been no fever  Associated symptoms include abdominal pain, congestion, coughing (dry ), diarrhea, ear pain, headaches, nausea, sinus pain and swollen glands  Pertinent negatives include no chest pain, plugged ear sensation, sneezing, sore throat (raw feeling ) or vomiting  She has tried nothing for the symptoms         The following portions of the patient's history were reviewed and updated as appropriate: allergies, current medications, past family history, past medical history, past social history, past surgical history and problem list       Review of Systems   Constitutional: Positive for chills  Negative for fever  HENT: Positive for congestion, ear pain, postnasal drip, sinus pain and voice change  Negative for sneezing, sore throat (raw feeling ) and trouble swallowing  Respiratory: Positive for cough (dry ) and chest tightness  Negative for shortness of breath  Cardiovascular: Negative for chest pain and palpitations  Gastrointestinal: Positive for abdominal pain, diarrhea and nausea  Negative for vomiting  Neurological: Positive for headaches  Negative for dizziness and light-headedness  Data to review:       Objective:    Vitals:    01/14/20 1057   BP: 140/80   BP Location: Left arm   Patient Position: Sitting   Cuff Size: Large   Pulse: 68   Resp: 16   Weight: 97 7 kg (215 lb 6 4 oz)   Height: 5' 6" (1 676 m)        Physical Exam   Constitutional: She is oriented to person, place, and time  She appears well-developed and well-nourished  HENT:   Head: Normocephalic and atraumatic  Nose: Right sinus exhibits maxillary sinus tenderness and frontal sinus tenderness  Left sinus exhibits maxillary sinus tenderness and frontal sinus tenderness  Mild left side facial swelling    Cardiovascular: Normal rate, regular rhythm, S1 normal, S2 normal and normal heart sounds  No murmur heard  Pulmonary/Chest: Effort normal and breath sounds normal    Lymphadenopathy:     She has no cervical adenopathy  Neurological: She is alert and oriented to person, place, and time  Psychiatric: She has a normal mood and affect  Thought content normal    Nursing note and vitals reviewed

## 2020-01-28 ENCOUNTER — TELEPHONE (OUTPATIENT)
Dept: FAMILY MEDICINE CLINIC | Facility: CLINIC | Age: 55
End: 2020-01-28

## 2020-01-28 NOTE — TELEPHONE ENCOUNTER
EDIL - PT SEES [facility] AND WAS ON A ZPACK, WHICH MESSES WITH HER PRO-TIME RESULTS  SHE WILL GO IN MORNING FOR B/W FOR INR AND WILL WAIT UNTIL WE CALL BACK TO POSSIBLY SCHEDULE SOMETHING   SHE STILL HAS A COLD AND SEEMS TO BE BRUISING MORE SINCE SHE'S BEEN ON THE MED  THANK YOU

## 2020-01-29 ENCOUNTER — ANTICOAG VISIT (OUTPATIENT)
Dept: FAMILY MEDICINE CLINIC | Facility: CLINIC | Age: 55
End: 2020-01-29

## 2020-01-31 ENCOUNTER — APPOINTMENT (OUTPATIENT)
Dept: RADIOLOGY | Facility: MEDICAL CENTER | Age: 55
End: 2020-01-31
Payer: COMMERCIAL

## 2020-01-31 ENCOUNTER — OFFICE VISIT (OUTPATIENT)
Dept: FAMILY MEDICINE CLINIC | Facility: CLINIC | Age: 55
End: 2020-01-31
Payer: COMMERCIAL

## 2020-01-31 VITALS
SYSTOLIC BLOOD PRESSURE: 120 MMHG | OXYGEN SATURATION: 97 % | HEIGHT: 66 IN | HEART RATE: 68 BPM | WEIGHT: 217 LBS | DIASTOLIC BLOOD PRESSURE: 78 MMHG | BODY MASS INDEX: 34.87 KG/M2 | TEMPERATURE: 98.1 F | RESPIRATION RATE: 18 BRPM

## 2020-01-31 DIAGNOSIS — R06.2 WHEEZE: ICD-10-CM

## 2020-01-31 DIAGNOSIS — Z12.39 SCREENING FOR BREAST CANCER: ICD-10-CM

## 2020-01-31 DIAGNOSIS — R05.9 COUGH: ICD-10-CM

## 2020-01-31 DIAGNOSIS — R05.9 COUGH: Primary | ICD-10-CM

## 2020-01-31 PROCEDURE — 99213 OFFICE O/P EST LOW 20 MIN: CPT | Performed by: NURSE PRACTITIONER

## 2020-01-31 PROCEDURE — 71046 X-RAY EXAM CHEST 2 VIEWS: CPT

## 2020-01-31 PROCEDURE — 3008F BODY MASS INDEX DOCD: CPT | Performed by: NURSE PRACTITIONER

## 2020-01-31 RX ORDER — AMOXICILLIN AND CLAVULANATE POTASSIUM 875; 125 MG/1; MG/1
1 TABLET, FILM COATED ORAL EVERY 12 HOURS SCHEDULED
Qty: 14 TABLET | Refills: 0 | Status: SHIPPED | OUTPATIENT
Start: 2020-01-31 | End: 2020-02-07

## 2020-01-31 RX ORDER — PREDNISONE 10 MG/1
10 TABLET ORAL DAILY
Qty: 30 TABLET | Refills: 0 | Status: SHIPPED | OUTPATIENT
Start: 2020-01-31 | End: 2021-11-12

## 2020-01-31 NOTE — PROGRESS NOTES
Assessment and Plan:    Problem List Items Addressed This Visit     None      Visit Diagnoses     Cough    -  Primary    Relevant Medications    predniSONE 10 mg tablet    amoxicillin-clavulanate (AUGMENTIN) 875-125 mg per tablet    Other Relevant Orders    XR chest pa & lateral    Wheeze        Relevant Medications    predniSONE 10 mg tablet    amoxicillin-clavulanate (AUGMENTIN) 875-125 mg per tablet    Other Relevant Orders    XR chest pa & lateral    Screening for breast cancer        Relevant Orders    Mammo screening bilateral w 3d & cad                 Diagnoses and all orders for this visit:    Cough  -     predniSONE 10 mg tablet; Take 1 tablet (10 mg total) by mouth daily Take 4 tabs for 4 days, 3 tabs for 3 days, 2 tabs for 2 day and 1 tab for 1 day,  -     amoxicillin-clavulanate (AUGMENTIN) 875-125 mg per tablet; Take 1 tablet by mouth every 12 (twelve) hours for 7 days  -     XR chest pa & lateral; Future    Wheeze  -     predniSONE 10 mg tablet; Take 1 tablet (10 mg total) by mouth daily Take 4 tabs for 4 days, 3 tabs for 3 days, 2 tabs for 2 day and 1 tab for 1 day,  -     amoxicillin-clavulanate (AUGMENTIN) 875-125 mg per tablet; Take 1 tablet by mouth every 12 (twelve) hours for 7 days  -     XR chest pa & lateral; Future    Screening for breast cancer  -     Mammo screening bilateral w 3d & cad; Future              Subjective:      Patient ID: Marisol Castle is a 48 y o  female  CC:    Chief Complaint   Patient presents with    Cold Like Symptoms     Patient present today for her cold symptoms getting worse including on and off fever highest of 102 for the past 2-3 weeks  Per patient she was seen here and she was taking a zpack but she is not getting any better  HPI:    Patient here due to her symptoms getting worse  She states currently she has this deep cough that is very painful  She notes burning in her chest as well as in her  Maxillary sinus   Patient states she spikes a fever at night time t-max 102  She was previously treated with z-pack which did not seem to completely resolve her systems  She states she did get better for a day or two but then did get worse  The following portions of the patient's history were reviewed and updated as appropriate: allergies, current medications, past family history, past medical history, past social history, past surgical history and problem list       Review of Systems   Constitutional: Positive for chills, diaphoresis, fatigue and fever  HENT: Positive for voice change  Negative for congestion, postnasal drip, rhinorrhea and trouble swallowing  Respiratory: Positive for cough, chest tightness and shortness of breath  Cardiovascular: Positive for chest pain (right side )  Neurological: Positive for light-headedness  Psychiatric/Behavioral: Positive for sleep disturbance (sleeping in a recliner )  Data to review:       Objective:    Vitals:    01/31/20 0900   BP: 120/78   BP Location: Left arm   Patient Position: Sitting   Cuff Size: Large   Pulse: 68   Resp: 18   Temp: 98 1 °F (36 7 °C)   TempSrc: Temporal   SpO2: 97%   Weight: 98 4 kg (217 lb)   Height: 5' 6" (1 676 m)        Physical Exam   Constitutional: She is oriented to person, place, and time  She appears well-developed and well-nourished  HENT:   Head: Normocephalic and atraumatic  Right Ear: Tympanic membrane normal    Left Ear: Tympanic membrane normal    Nose: Nose normal    Mouth/Throat: Uvula is midline, oropharynx is clear and moist and mucous membranes are normal    Cardiovascular: Normal rate, regular rhythm and normal heart sounds  Pulmonary/Chest: Effort normal  No accessory muscle usage or stridor  No respiratory distress  She has wheezes in the right upper field  She has rhonchi in the right upper field  Lymphadenopathy:     She has no cervical adenopathy  Neurological: She is alert and oriented to person, place, and time     Psychiatric: She has a normal mood and affect  Thought content normal    Nursing note and vitals reviewed  BMI Counseling: Body mass index is 35 02 kg/m²  The BMI is above normal  Nutrition recommendations include decreasing portion sizes, moderation in carbohydrate intake, reducing intake of saturated and trans fat and reducing intake of cholesterol  Exercise recommendations include moderate physical activity 150 minutes/week and strength training exercises

## 2020-02-03 ENCOUNTER — TELEPHONE (OUTPATIENT)
Dept: FAMILY MEDICINE CLINIC | Facility: CLINIC | Age: 55
End: 2020-02-03

## 2020-02-03 NOTE — LETTER
February 4, 2020     Patient: Pia Reilly   YOB: 1965   Date of Visit: 2/3/2020       To Whom it May Concern:    Kary Law is under my professional care  She was seen in my office on 1/31/20  She may return to work on 2/05/20  If you have any questions or concerns, please don't hesitate to call           Sincerely,          EZEQUIEL Shea        CC: No Recipients

## 2020-02-03 NOTE — TELEPHONE ENCOUNTER
Pt called in because she will like a work note since she has been out since Friday 1/31/20 and wont go back til 2/5/20   Please e-mail [email or web address]  Thank you

## 2020-02-11 ENCOUNTER — ANTICOAG VISIT (OUTPATIENT)
Dept: FAMILY MEDICINE CLINIC | Facility: CLINIC | Age: 55
End: 2020-02-11

## 2020-02-11 ENCOUNTER — APPOINTMENT (EMERGENCY)
Dept: RADIOLOGY | Facility: HOSPITAL | Age: 55
End: 2020-02-11
Payer: COMMERCIAL

## 2020-02-11 ENCOUNTER — HOSPITAL ENCOUNTER (EMERGENCY)
Facility: HOSPITAL | Age: 55
Discharge: HOME | End: 2020-02-11
Attending: EMERGENCY MEDICINE | Admitting: EMERGENCY MEDICINE
Payer: COMMERCIAL

## 2020-02-11 ENCOUNTER — OFFICE VISIT (OUTPATIENT)
Dept: FAMILY MEDICINE CLINIC | Facility: CLINIC | Age: 55
End: 2020-02-11
Payer: COMMERCIAL

## 2020-02-11 ENCOUNTER — APPOINTMENT (EMERGENCY)
Dept: CT IMAGING | Facility: HOSPITAL | Age: 55
End: 2020-02-11
Payer: COMMERCIAL

## 2020-02-11 VITALS
BODY MASS INDEX: 35.33 KG/M2 | SYSTOLIC BLOOD PRESSURE: 153 MMHG | RESPIRATION RATE: 20 BRPM | WEIGHT: 218.92 LBS | DIASTOLIC BLOOD PRESSURE: 67 MMHG | TEMPERATURE: 98.4 F | HEART RATE: 67 BPM | OXYGEN SATURATION: 98 %

## 2020-02-11 VITALS
HEIGHT: 66 IN | WEIGHT: 217.2 LBS | BODY MASS INDEX: 34.91 KG/M2 | SYSTOLIC BLOOD PRESSURE: 128 MMHG | HEART RATE: 64 BPM | DIASTOLIC BLOOD PRESSURE: 80 MMHG | OXYGEN SATURATION: 98 %

## 2020-02-11 DIAGNOSIS — J22 CHEST COLD: ICD-10-CM

## 2020-02-11 DIAGNOSIS — R07.89 ATYPICAL CHEST PAIN: Primary | ICD-10-CM

## 2020-02-11 DIAGNOSIS — R05.9 COUGH: Primary | ICD-10-CM

## 2020-02-11 DIAGNOSIS — I27.82 OTHER CHRONIC PULMONARY EMBOLISM WITHOUT ACUTE COR PULMONALE (HCC): ICD-10-CM

## 2020-02-11 DIAGNOSIS — R06.02 SHORTNESS OF BREATH: ICD-10-CM

## 2020-02-11 LAB
ALBUMIN SERPL BCP-MCNC: 3.6 G/DL (ref 3.5–5)
ALP SERPL-CCNC: 91 U/L (ref 46–116)
ALT SERPL W P-5'-P-CCNC: 16 U/L (ref 12–78)
ANION GAP SERPL CALCULATED.3IONS-SCNC: 9 MMOL/L (ref 4–13)
APTT PPP: 30 SECONDS (ref 23–37)
AST SERPL W P-5'-P-CCNC: 28 U/L (ref 5–45)
BASOPHILS # BLD AUTO: 0.05 THOUSANDS/ΜL (ref 0–0.1)
BASOPHILS NFR BLD AUTO: 1 % (ref 0–1)
BILIRUB SERPL-MCNC: 0.48 MG/DL (ref 0.2–1)
BUN SERPL-MCNC: 16 MG/DL (ref 5–25)
CALCIUM SERPL-MCNC: 8.6 MG/DL (ref 8.3–10.1)
CHLORIDE SERPL-SCNC: 104 MMOL/L (ref 100–108)
CO2 SERPL-SCNC: 27 MMOL/L (ref 21–32)
CREAT SERPL-MCNC: 0.79 MG/DL (ref 0.6–1.3)
EOSINOPHIL # BLD AUTO: 0.14 THOUSAND/ΜL (ref 0–0.61)
EOSINOPHIL NFR BLD AUTO: 2 % (ref 0–6)
ERYTHROCYTE [DISTWIDTH] IN BLOOD BY AUTOMATED COUNT: 19 % (ref 11.6–15.1)
GFR SERPL CREATININE-BSD FRML MDRD: 86 ML/MIN/1.73SQ M
GLUCOSE SERPL-MCNC: 141 MG/DL (ref 65–140)
HCT VFR BLD AUTO: 38.8 % (ref 34.8–46.1)
HGB BLD-MCNC: 11.4 G/DL (ref 11.5–15.4)
IMM GRANULOCYTES # BLD AUTO: 0.02 THOUSAND/UL (ref 0–0.2)
IMM GRANULOCYTES NFR BLD AUTO: 0 % (ref 0–2)
INR PPP: 1.65 (ref 0.84–1.19)
LYMPHOCYTES # BLD AUTO: 2.49 THOUSANDS/ΜL (ref 0.6–4.47)
LYMPHOCYTES NFR BLD AUTO: 26 % (ref 14–44)
MCH RBC QN AUTO: 22.3 PG (ref 26.8–34.3)
MCHC RBC AUTO-ENTMCNC: 29.4 G/DL (ref 31.4–37.4)
MCV RBC AUTO: 76 FL (ref 82–98)
MONOCYTES # BLD AUTO: 0.56 THOUSAND/ΜL (ref 0.17–1.22)
MONOCYTES NFR BLD AUTO: 6 % (ref 4–12)
NEUTROPHILS # BLD AUTO: 6.24 THOUSANDS/ΜL (ref 1.85–7.62)
NEUTS SEG NFR BLD AUTO: 65 % (ref 43–75)
NRBC BLD AUTO-RTO: 0 /100 WBCS
PLATELET # BLD AUTO: 322 THOUSANDS/UL (ref 149–390)
PMV BLD AUTO: 10.2 FL (ref 8.9–12.7)
POTASSIUM SERPL-SCNC: 4 MMOL/L (ref 3.5–5.3)
PROT SERPL-MCNC: 7.8 G/DL (ref 6.4–8.2)
PROTHROMBIN TIME: 19.8 SECONDS (ref 11.6–14.5)
RBC # BLD AUTO: 5.11 MILLION/UL (ref 3.81–5.12)
SODIUM SERPL-SCNC: 140 MMOL/L (ref 136–145)
TROPONIN I SERPL-MCNC: <0.02 NG/ML
WBC # BLD AUTO: 9.5 THOUSAND/UL (ref 4.31–10.16)

## 2020-02-11 PROCEDURE — 85610 PROTHROMBIN TIME: CPT | Performed by: EMERGENCY MEDICINE

## 2020-02-11 PROCEDURE — 71275 CT ANGIOGRAPHY CHEST: CPT

## 2020-02-11 PROCEDURE — 84484 ASSAY OF TROPONIN QUANT: CPT | Performed by: EMERGENCY MEDICINE

## 2020-02-11 PROCEDURE — 80053 COMPREHEN METABOLIC PANEL: CPT | Performed by: EMERGENCY MEDICINE

## 2020-02-11 PROCEDURE — 99284 EMERGENCY DEPT VISIT MOD MDM: CPT | Performed by: EMERGENCY MEDICINE

## 2020-02-11 PROCEDURE — 3008F BODY MASS INDEX DOCD: CPT | Performed by: NURSE PRACTITIONER

## 2020-02-11 PROCEDURE — 99285 EMERGENCY DEPT VISIT HI MDM: CPT

## 2020-02-11 PROCEDURE — 93005 ELECTROCARDIOGRAM TRACING: CPT

## 2020-02-11 PROCEDURE — 85025 COMPLETE CBC W/AUTO DIFF WBC: CPT | Performed by: EMERGENCY MEDICINE

## 2020-02-11 PROCEDURE — 1036F TOBACCO NON-USER: CPT | Performed by: NURSE PRACTITIONER

## 2020-02-11 PROCEDURE — 85730 THROMBOPLASTIN TIME PARTIAL: CPT | Performed by: EMERGENCY MEDICINE

## 2020-02-11 PROCEDURE — 36415 COLL VENOUS BLD VENIPUNCTURE: CPT

## 2020-02-11 PROCEDURE — 99214 OFFICE O/P EST MOD 30 MIN: CPT | Performed by: NURSE PRACTITIONER

## 2020-02-11 PROCEDURE — 71046 X-RAY EXAM CHEST 2 VIEWS: CPT

## 2020-02-11 RX ORDER — ACETAMINOPHEN 325 MG/1
650 TABLET ORAL ONCE
Status: COMPLETED | OUTPATIENT
Start: 2020-02-11 | End: 2020-02-11

## 2020-02-11 RX ADMIN — ACETAMINOPHEN 650 MG: 325 TABLET ORAL at 21:32

## 2020-02-11 RX ADMIN — IOHEXOL 85 ML: 350 INJECTION, SOLUTION INTRAVENOUS at 21:51

## 2020-02-11 NOTE — ASSESSMENT & PLAN NOTE
Patient states she would feel mor comfortable if she present to the ER to rule out PE and for hydration given the duration of symptoms and progression of fatigue and SOB

## 2020-02-11 NOTE — PROGRESS NOTES
Assessment and Plan:    Problem List Items Addressed This Visit        Cardiovascular and Mediastinum    Chronic pulmonary embolism ([facility] )     Patient states she would feel mor comfortable if she present to the ER to rule out PE and for hydration given the duration of symptoms and progression of fatigue and SOB  Relevant Orders    Ambulatory Referral to Emergency Medicine      Other Visit Diagnoses     Cough    -  Primary    Relevant Orders    Ambulatory Referral to Emergency Medicine    Shortness of breath        Relevant Orders    Ambulatory Referral to Emergency Medicine                 Diagnoses and all orders for this visit:    Cough  -     Ambulatory Referral to Emergency Medicine; Future    Shortness of breath  -     Ambulatory Referral to Emergency Medicine; Future    Other chronic pulmonary embolism without acute cor pulmonale ([facility] )  -     Ambulatory Referral to Emergency Medicine; Future    Other orders  -     Cancel: TSH, 3rd generation with Free T4 reflex; Future  -     Cancel: CBC and differential; Future  -     Cancel: Iron              Subjective:      Patient ID: Jacob Garcia is a 48 y o  female  CC:    Chief Complaint   Patient presents with    Fatigue     Pt c/o extreme fatigue and exhaustion  She states she is also having extremely vivid dreams  Pt states she is done with the Abx and steriods but still she feels awful  kw    Other     Tightness in her Right lung  Feels like it did when she had the PE before  HPI:    Patient states she not feeling well after being sick  She states she having trouble bouncing back from her illness  Patient states she just finished her steroids  She states she feels extreme weakness  She states she feels like her body from the inside is shaking  Patient states she did get her period today and she typically has heavy bleed due to being on blood thinners  She has been in multiple times 1/14/2020 and 1/31/2020 with complaints of illness   She states she has been feeling off for about a month  Patient has cough and increase SOB with fatigue  Chest pressure on right side with worsening symptoms when she is laying down at night  The following portions of the patient's history were reviewed and updated as appropriate: allergies, current medications, past family history, past medical history, past social history, past surgical history and problem list       Review of Systems   Constitutional: Positive for fatigue  Negative for chills, diaphoresis and fever  HENT: Negative for congestion  Dry mouth    Respiratory: Positive for cough, chest tightness (on the right side worse with laying down ) and shortness of breath  Negative for apnea and wheezing  Cardiovascular: Positive for palpitations (occasional )  Endocrine: Positive for polydipsia  Neurological: Positive for dizziness, weakness and light-headedness  Psychiatric/Behavioral: Positive for decreased concentration and sleep disturbance  The patient is nervous/anxious  Data to review:       Objective:    Vitals:    02/11/20 1636 02/11/20 1705   BP: 128/80    BP Location: Left arm    Patient Position: Sitting    Pulse: 64    SpO2:  98%   Weight: 98 5 kg (217 lb 3 2 oz)    Height: 5' 6" (1 676 m)         Physical Exam   Constitutional: She is oriented to person, place, and time  She appears well-developed and well-nourished  She appears ill  HENT:   Head: Normocephalic and atraumatic  Cardiovascular: Normal rate, regular rhythm and normal heart sounds  Pulmonary/Chest: Effort normal and breath sounds normal  She has no wheezes  She has no rhonchi  Dry tight bronchial cough, no pleural rub or rales    Neurological: She is alert and oriented to person, place, and time  Psychiatric: Her mood appears anxious  Nursing note and vitals reviewed

## 2020-02-12 ENCOUNTER — TELEPHONE (OUTPATIENT)
Dept: FAMILY MEDICINE CLINIC | Facility: CLINIC | Age: 55
End: 2020-02-12

## 2020-02-12 DIAGNOSIS — E55.9 VITAMIN D DEFICIENCY: Primary | ICD-10-CM

## 2020-02-12 DIAGNOSIS — D64.9 ANEMIA, UNSPECIFIED TYPE: ICD-10-CM

## 2020-02-12 DIAGNOSIS — R06.02 SHORTNESS OF BREATH: ICD-10-CM

## 2020-02-12 DIAGNOSIS — R41.0 EPISODIC CONFUSION: ICD-10-CM

## 2020-02-12 LAB
ATRIAL RATE: 91 BPM
P AXIS: 67 DEGREES
PR INTERVAL: 150 MS
QRS AXIS: 21 DEGREES
QRSD INTERVAL: 88 MS
QT INTERVAL: 380 MS
QTC INTERVAL: 467 MS
T WAVE AXIS: 45 DEGREES
VENTRICULAR RATE: 91 BPM

## 2020-02-12 PROCEDURE — 93010 ELECTROCARDIOGRAM REPORT: CPT | Performed by: INTERNAL MEDICINE

## 2020-02-12 RX ORDER — ALBUTEROL SULFATE 90 UG/1
1-2 AEROSOL, METERED RESPIRATORY (INHALATION) EVERY 4 HOURS PRN
Qty: 18 G | Refills: 0 | Status: SHIPPED | OUTPATIENT
Start: 2020-02-12 | End: 2020-04-20 | Stop reason: SDUPTHER

## 2020-02-12 NOTE — TELEPHONE ENCOUNTER
PATIENT CALLED IN STATES SHE WAS HERE FOR AN OV & THEN STATES SHE WENT TO THE ER LASTNIGHT PT STATE SHE DISCUSSED WITH ANGELITO ABOUT PRESCRIBING INHALER FOR NEXT STEP  PATIENT WOULD LIKE SCRIPT SENT TO [facility] ON  [address]         PLEASE CALL PATIENT BACK [phone]

## 2020-02-12 NOTE — ED PROVIDER NOTES
History  Chief Complaint   Patient presents with    Chest Pain     Reports tightness in right side of chest wraps around to upper back between shoulder blades with sob  Hx of PE  Takes Warfarin currently  Sx started 1 month ago  47 yo female with hx of PE 9 years ago for which she has been on coumadin ever since - gets INR checked every 1-2 weeks depending on if she is on abx etc  Pt recently on 2 abx and steroids for bronchitis symptoms and over past 2 weeks has felt winded, tired and had R sided heaviness similar to when diagnosed with PE 9 years ago  History provided by:  Patient   used: No    Chest Pain   Pain location:  R chest  Pain quality: pressure    Pain radiates to:  Upper back  Pain severity:  Moderate  Onset quality:  Gradual  Duration:  2 weeks  Timing:  Constant  Progression:  Waxing and waning  Chronicity:  Recurrent  Relieved by:  Nothing  Worsened by:  Deep breathing  Ineffective treatments:  None tried  Associated symptoms: cough, fatigue and shortness of breath    Associated symptoms: no abdominal pain, no back pain (between shoulders), no dizziness, no fever, no headache, no nausea, no numbness, no palpitations and not vomiting    Cough:     Cough characteristics:  Harsh    Progression:  Improving  Fatigue:     Severity:  Moderate    Duration:  2 weeks    Timing:  Constant  Shortness of breath:     Severity:  Mild    Onset quality:  Gradual    Duration:  2 weeks    Timing:  Constant      Prior to Admission Medications   Prescriptions Last Dose Informant Patient Reported? Taking?    Biotin 1000 MCG tablet  Self Yes Yes   Sig: Take 1 tablet by mouth daily   Calcium Citrate 1040 MG TABS  Self Yes Yes   Sig: Take 1 tablet by mouth daily   Cholecalciferol 1 25 MG (06410 UT) capsule  Self No No   Sig: Take 1 capsule (50,000 Units total) by mouth 2 (two) times a week   Cyanocobalamin (VITAMIN B-12) 1000 MCG SUBL  Self Yes Yes   Sig: Place 1 tablet under the tongue daily Multiple Vitamin (MULTIVITAMIN) capsule  Self Yes Yes   Sig: Take 1 capsule by mouth daily   esomeprazole (NexIUM) 20 mg capsule  Self Yes Yes   Sig: Take 20 mg by mouth every morning before breakfast   ferrous sulfate 325 (65 Fe) mg tablet  Self Yes Yes   Sig: Take 600 mg by mouth daily with breakfast   fluticasone (FLONASE) 50 mcg/act nasal spray  Self No Yes   Sig: USE 1 SPRAY IN EACH NOSTRIL ONCE DAILY  montelukast (SINGULAIR) 10 mg tablet  Self No No   Sig: Take 1 tablet (10 mg total) by mouth daily at bedtime   predniSONE 10 mg tablet 2/10/2020 at Unknown time Self No Yes   Sig: Take 1 tablet (10 mg total) by mouth daily Take 4 tabs for 4 days, 3 tabs for 3 days, 2 tabs for 2 day and 1 tab for 1 day,   warfarin (COUMADIN) 5 mg tablet  Self No Yes   Sig: Take 2 tablets (10 mg total) by mouth daily Or as directed by physician      Facility-Administered Medications: None       Past Medical History:   Diagnosis Date    Anemia     [facility] venous thrombosis[facility] ([facility])     [facility] Leiden [facility] )     GERD (gastroesophageal reflux disease)     PONV (postoperative nausea and vomiting)     Pulmonary embolism ([facility] )     S/P gastric surgery     gastric sleeve     Sleep apnea     wears c-pap, Last assessed: 3/7/14       Past Surgical History:   Procedure Laterality Date    CHOLECYSTECTOMY      COLONOSCOPY      complete, resolved: 1996    EGD AND COLONOSCOPY N/A 3/8/2018    Procedure: EGD AND COLONOSCOPY;  Surgeon: Bibiana Staton MD;  Location: [facility];   Service: Gastroenterology    GALLBLADDER SURGERY      IVC FILTER INSERTION  04/01/2014    Radiologic Supervision: [place] Filter Placement in IVC    MOUTH SURGERY      STOMACH SURGERY  04/01/2014    sleeve, Gastric surgery for Morbid Obesity Laparoscopic Longitudinal Gastrectomy, per allscripts       Family History   Problem Relation Age of Onset    Osteoporosis Mother     Diabetes Father     Heart disease Father     Arthritis Father  Heart failure Father     Hypertension Father      I have reviewed and agree with the history as documented  Social History     Tobacco Use    Smoking status: Never Smoker    Smokeless tobacco: Never Used    Tobacco comment: current non-smoker, per allscripts   Substance Use Topics    Alcohol use: Yes     Comment: socially    Drug use: No       Review of Systems   Constitutional: Positive for fatigue  Negative for appetite change, chills and fever  HENT: Negative for congestion and sore throat  Eyes: Negative for visual disturbance  Respiratory: Positive for cough and shortness of breath  Cardiovascular: Positive for chest pain (R sided)  Negative for palpitations and leg swelling  Gastrointestinal: Negative for abdominal pain, diarrhea, nausea and vomiting  Genitourinary: Negative for dysuria, frequency, vaginal bleeding and vaginal discharge  Musculoskeletal: Negative for back pain (between shoulders), neck pain and neck stiffness  Skin: Negative for pallor and rash  Allergic/Immunologic: Negative for immunocompromised state  Neurological: Negative for dizziness, light-headedness, numbness and headaches  Psychiatric/Behavioral: Negative for confusion  All other systems reviewed and are negative  Physical Exam  Physical Exam   Constitutional: She is oriented to person, place, and time  She appears well-developed and well-nourished  No distress  HENT:   Head: Normocephalic and atraumatic  Right Ear: External ear normal    Left Ear: External ear normal    Mouth/Throat: Oropharynx is clear and moist    Eyes: EOM are normal    Neck: Neck supple  Cardiovascular: Normal rate and regular rhythm  No murmur heard  Pulmonary/Chest: Effort normal and breath sounds normal  No respiratory distress  Abdominal: Soft  Bowel sounds are normal    Musculoskeletal: Normal range of motion  Right lower leg: She exhibits no edema  Left lower leg: She exhibits no edema  Neurological: She is alert and oriented to person, place, and time  Skin: Skin is warm  No rash noted  No pallor  Psychiatric: She has a normal mood and affect  Her behavior is normal    Nursing note and vitals reviewed        Vital Signs  ED Triage Vitals   Temperature Pulse Respirations Blood Pressure SpO2   02/11/20 1828 02/11/20 1828 02/11/20 1828 02/11/20 1828 02/11/20 1828   98 4 °F (36 9 °C) 96 18 162/78 99 %      Temp Source Heart Rate Source Patient Position - Orthostatic VS BP Location FiO2 (%)   02/11/20 1828 02/11/20 2059 02/11/20 2059 02/11/20 2059 --   Temporal Monitor Lying Right arm       Pain Score       02/11/20 1828       4           Vitals:    02/11/20 1828 02/11/20 2059   BP: 162/78 153/67   Pulse: 96 67   Patient Position - Orthostatic VS:  Lying         Visual Acuity      ED Medications  Medications   acetaminophen (TYLENOL) tablet 650 mg (650 mg Oral Given 2/11/20 2132)   iohexol (OMNIPAQUE) 350 MG/ML injection (MULTI-DOSE) 100 mL (85 mL Intravenous Given 2/11/20 2151)       Diagnostic Studies  Results Reviewed     Procedure Component Value Units Date/Time    Troponin I [[id number]]  (Normal) Collected:  02/11/20 1839    Lab Status:  Final result Specimen:  Blood from Arm, Right Updated:  02/11/20 1920     Troponin I <0 02 ng/mL     Comprehensive metabolic panel [[id number]]  (Abnormal) Collected:  02/11/20 1839    Lab Status:  Final result Specimen:  Blood from Arm, Right Updated:  02/11/20 1917     Sodium 140 mmol/L      Potassium 4 0 mmol/L      Chloride 104 mmol/L      CO2 27 mmol/L      ANION GAP 9 mmol/L      BUN 16 mg/dL      Creatinine 0 79 mg/dL      Glucose 141 mg/dL      Calcium 8 6 mg/dL      AST 28 U/L      ALT 16 U/L      Alkaline Phosphatase 91 U/L      Total Protein 7 8 g/dL      Albumin 3 6 g/dL      Total Bilirubin 0 48 mg/dL      eGFR 86 ml/min/1 73sq m     Narrative:       [place] guidelines for Chronic Kidney Disease (CKD):     Stage 1 with normal or high GFR (GFR > 90 mL/min/1 73 square meters)    Stage 2 Mild CKD (GFR = 60-89 mL/min/1 73 square meters)    Stage 3A Moderate CKD (GFR = 45-59 mL/min/1 73 square meters)    Stage 3B Moderate CKD (GFR = 30-44 mL/min/1 73 square meters)    Stage 4 Severe CKD (GFR = 15-29 mL/min/1 73 square meters)    Stage 5 End Stage CKD (GFR <15 mL/min/1 73 square meters)  Note: GFR calculation is accurate only with a steady state creatinine    APTT [[id number]]  (Normal) Collected:  02/11/20 1839    Lab Status:  Final result Specimen:  Blood from Arm, Right Updated:  02/11/20 1915     PTT 30 seconds     Protime-INR [[id number]]  (Abnormal) Collected:  02/11/20 1839    Lab Status:  Final result Specimen:  Blood from Arm, Right Updated:  02/11/20 1915     Protime 19 8 seconds      INR 1 65    CBC and differential [[id number]]  (Abnormal) Collected:  02/11/20 1839    Lab Status:  Final result Specimen:  Blood from Arm, Right Updated:  02/11/20 1856     WBC 9 50 Thousand/uL      RBC 5 11 Million/uL      Hemoglobin 11 4 g/dL      Hematocrit 38 8 %      MCV 76 fL      MCH 22 3 pg      MCHC 29 4 g/dL      RDW 19 0 %      MPV 10 2 fL      Platelets 947 Thousands/uL      nRBC 0 /100 WBCs      Neutrophils Relative 65 %      Immat GRANS % 0 %      Lymphocytes Relative 26 %      Monocytes Relative 6 %      Eosinophils Relative 2 %      Basophils Relative 1 %      Neutrophils Absolute 6 24 Thousands/µL      Immature Grans Absolute 0 02 Thousand/uL      Lymphocytes Absolute 2 49 Thousands/µL      Monocytes Absolute 0 56 Thousand/µL      Eosinophils Absolute 0 14 Thousand/µL      Basophils Absolute 0 05 Thousands/µL                  CTA ED chest PE study   Final Result by Arianna Alamo MD (02/11 2205)      1  No evidence of pulmonary embolism  2   No focal consolidation  3   Small hiatal hernia  Status post sleeve gastrectomy  4   Status post cholecystectomy        Workstation performed: [id number]         XR chest 2 views (Results Pending)              Procedures  ECG 12 Lead Documentation Only  Date/Time: 2/11/2020 9:25 PM  Performed by: Lisa Willoughby DO  Authorized by: Lisa Willoughby DO     Indications / Diagnosis:  R sided chest pressure  Interpretation:     Interpretation: normal    Rate:     ECG rate:  91    ECG rate assessment: normal    Rhythm:     Rhythm: sinus rhythm    Ectopy:     Ectopy: none    QRS:     QRS axis:  Normal    QRS intervals:  Normal  Conduction:     Conduction: normal    ST segments:     ST segments:  Normal  T waves:     T waves: normal               ED Course  ED Course as of Feb 12 0619   Tue Feb 11, 2020   2112 Pt seen and examined  49 yo female with hx of PE 9 years ago for which she has been on coumadin ever since - gets INR checked every 1-2 weeks depending on if she is on abx etc  Pt recently on 2 abx and steroids for bronchitis symptoms and over past 2 weeks has felt winded, tired and had R sided heaviness similar to when diagnosed with PE 9 years ago  First nursed and EKG NSR 91 no ischemia, chest xray WNL, labs ok other than INR subther at 1 65  Will give tylenol and TC r/o PE        2158 Pt tolerated CT chest r/o PE, awaiting results  2215 CT shows 1  No evidence of pulmonary embolism  2   No focal consolidation  3   Small hiatal hernia  Status post sleeve gastrectomy  4   Status post cholecystectomy  2228 Reviewed CT results with pt - very relieved about no PE  Will f/u with PCP and discuss coumadin dosing with physician who handles dosing as INR 1 65 on decreased dose and just finished abx                                     MDM      Disposition  Final diagnoses:   Atypical chest pain   Chest cold     Time reflects when diagnosis was documented in both MDM as applicable and the Disposition within this note     Time User Action Codes Description Comment    2/11/2020 10:27 PM Luann Favre Add [R07 89] Atypical chest pain     2/11/2020 10:27 PM Jorge, [address] [facility] [facility] Chest cold       ED Disposition     ED Disposition Condition Date/Time Comment    Discharge Stable Tue Feb 11, 2020 10:26 PM Trinity Osborne discharge to home/self care  Follow-up Information     Follow up With Specialties Details Why Contact Info    Bi Floyd, [facility], Nurse Practitioner Schedule an appointment as soon as possible for a visit  discuss coumadin dosing - INR 1 65 [address]  [phone]            Discharge Medication List as of 2/11/2020 10:28 PM      CONTINUE these medications which have NOT CHANGED    Details   Biotin 1000 MCG tablet Take 1 tablet by mouth daily, Historical Med      Calcium Citrate 1040 MG TABS Take 1 tablet by mouth daily, Historical Med      Cholecalciferol 1 25 MG (43258 UT) capsule Take 1 capsule (50,000 Units total) by mouth 2 (two) times a week, Starting Mon 11/11/2019, Normal      Cyanocobalamin (VITAMIN B-12) 1000 MCG SUBL Place 1 tablet under the tongue daily, Historical Med      esomeprazole (NexIUM) 20 mg capsule Take 20 mg by mouth every morning before breakfast, Historical Med      ferrous sulfate 325 (65 Fe) mg tablet Take 600 mg by mouth daily with breakfast, Historical Med      fluticasone (FLONASE) 50 mcg/act nasal spray USE 1 SPRAY IN EACH NOSTRIL ONCE DAILY , Normal      montelukast (SINGULAIR) 10 mg tablet Take 1 tablet (10 mg total) by mouth daily at bedtime, Starting Thu 8/2/2018, Normal      Multiple Vitamin (MULTIVITAMIN) capsule Take 1 capsule by mouth daily, Historical Med      predniSONE 10 mg tablet Take 1 tablet (10 mg total) by mouth daily Take 4 tabs for 4 days, 3 tabs for 3 days, 2 tabs for 2 day and 1 tab for 1 day,, Starting Fri 1/31/2020, Normal      warfarin (COUMADIN) 5 mg tablet Take 2 tablets (10 mg total) by mouth daily Or as directed by physician, Starting Tue 1/14/2020, Normal           No discharge procedures on file      PDMP Review     None          ED Provider  Electronically Signed by           Qasim Kate DO  02/12/20 8588

## 2020-02-12 NOTE — TELEPHONE ENCOUNTER
Patient states she feeling jitteriness and word mix up  She states extreme fatigue persist  Patient notes at the hospital she kept calling her  Isamar Nicole but his name is bill  He notes she had several weeks at work where she is miss naming things as well that co workers have noticed  She denies any wandering, getting lost or not recognizing her surroundings, mostly word associated   Pt advised on side effects of ventolin

## 2020-02-14 LAB
25(OH)D3 SERPL-MCNC: 38 NG/ML (ref 30–100)
B BURGDOR AB SER IA-ACNC: <0.9 INDEX
IRON SERPL-MCNC: 37 MCG/DL (ref 45–160)
TSH SERPL-ACNC: 1 MIU/L
VIT B12 SERPL-MCNC: 293 PG/ML (ref 200–1100)

## 2020-02-21 DIAGNOSIS — I26.09 OTHER PULMONARY EMBOLISM WITH ACUTE COR PULMONALE, UNSPECIFIED CHRONICITY (HCC): ICD-10-CM

## 2020-02-21 RX ORDER — WARFARIN SODIUM 5 MG/1
10 TABLET ORAL DAILY
Qty: 90 TABLET | Refills: 0 | Status: SHIPPED | OUTPATIENT
Start: 2020-02-21 | End: 2020-04-18

## 2020-03-16 ENCOUNTER — TELEPHONE (OUTPATIENT)
Dept: FAMILY MEDICINE CLINIC | Facility: CLINIC | Age: 55
End: 2020-03-16

## 2020-03-16 ENCOUNTER — ANTICOAG VISIT (OUTPATIENT)
Dept: FAMILY MEDICINE CLINIC | Facility: CLINIC | Age: 55
End: 2020-03-16

## 2020-03-16 DIAGNOSIS — E53.8 VITAMIN B 12 DEFICIENCY: ICD-10-CM

## 2020-03-16 DIAGNOSIS — D50.0 IRON DEFICIENCY ANEMIA DUE TO CHRONIC BLOOD LOSS: ICD-10-CM

## 2020-03-16 DIAGNOSIS — E55.9 VITAMIN D DEFICIENCY: Primary | ICD-10-CM

## 2020-03-16 NOTE — TELEPHONE ENCOUNTER
Pt wants to know when should she have her next B12 level checked? Last done in Feb   Currently taking 1000mcg daily

## 2020-03-18 ENCOUNTER — TELEPHONE (OUTPATIENT)
Dept: FAMILY MEDICINE CLINIC | Facility: CLINIC | Age: 55
End: 2020-03-18

## 2020-03-18 NOTE — TELEPHONE ENCOUNTER
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

## 2020-03-18 NOTE — TELEPHONE ENCOUNTER
At this time patient patient seems asymptomatic with regards to fever and cough  If she were to develop both please have her call the office back so that she can be triaged further and directed for further instruction by me

## 2020-04-17 DIAGNOSIS — I26.09 OTHER PULMONARY EMBOLISM WITH ACUTE COR PULMONALE, UNSPECIFIED CHRONICITY (HCC): ICD-10-CM

## 2020-04-18 RX ORDER — WARFARIN SODIUM 5 MG/1
TABLET ORAL
Qty: 90 TABLET | Refills: 1 | Status: SHIPPED | OUTPATIENT
Start: 2020-04-18 | End: 2020-07-10

## 2020-04-20 ENCOUNTER — TELEMEDICINE (OUTPATIENT)
Dept: FAMILY MEDICINE CLINIC | Facility: CLINIC | Age: 55
End: 2020-04-20
Payer: COMMERCIAL

## 2020-04-20 VITALS — TEMPERATURE: 97.4 F

## 2020-04-20 DIAGNOSIS — H00.024 HORDEOLUM INTERNUM LEFT UPPER EYELID: Primary | ICD-10-CM

## 2020-04-20 DIAGNOSIS — J01.10 ACUTE NON-RECURRENT FRONTAL SINUSITIS: ICD-10-CM

## 2020-04-20 PROCEDURE — 99213 OFFICE O/P EST LOW 20 MIN: CPT | Performed by: NURSE PRACTITIONER

## 2020-04-20 RX ORDER — ALBUTEROL SULFATE 90 UG/1
1-2 AEROSOL, METERED RESPIRATORY (INHALATION) EVERY 4 HOURS PRN
Qty: 18 G | Refills: 0 | Status: SHIPPED | OUTPATIENT
Start: 2020-04-20 | End: 2021-11-12

## 2020-04-20 RX ORDER — AZITHROMYCIN 250 MG/1
TABLET, FILM COATED ORAL
Qty: 6 TABLET | Refills: 0 | Status: SHIPPED | OUTPATIENT
Start: 2020-04-20 | End: 2020-04-25

## 2020-05-09 ENCOUNTER — ANTICOAG VISIT (OUTPATIENT)
Dept: FAMILY MEDICINE CLINIC | Facility: CLINIC | Age: 55
End: 2020-05-09

## 2020-07-08 DIAGNOSIS — I26.09 OTHER PULMONARY EMBOLISM WITH ACUTE COR PULMONALE, UNSPECIFIED CHRONICITY (HCC): ICD-10-CM

## 2020-07-10 RX ORDER — WARFARIN SODIUM 5 MG/1
TABLET ORAL
Qty: 180 TABLET | Refills: 0 | Status: SHIPPED | OUTPATIENT
Start: 2020-07-10 | End: 2021-11-12 | Stop reason: SDUPTHER

## 2020-10-03 DIAGNOSIS — I26.09 OTHER PULMONARY EMBOLISM WITH ACUTE COR PULMONALE, UNSPECIFIED CHRONICITY (HCC): ICD-10-CM

## 2020-10-12 RX ORDER — WARFARIN SODIUM 5 MG/1
TABLET ORAL
Qty: 180 TABLET | Refills: 0 | OUTPATIENT
Start: 2020-10-12

## 2020-11-01 DIAGNOSIS — I26.09 OTHER PULMONARY EMBOLISM WITH ACUTE COR PULMONALE, UNSPECIFIED CHRONICITY (HCC): ICD-10-CM

## 2020-11-02 RX ORDER — WARFARIN SODIUM 5 MG/1
TABLET ORAL
Qty: 180 TABLET | Refills: 0 | OUTPATIENT
Start: 2020-11-02

## 2020-11-18 ENCOUNTER — ANTICOAG VISIT (OUTPATIENT)
Dept: FAMILY MEDICINE CLINIC | Facility: CLINIC | Age: 55
End: 2020-11-18

## 2021-06-22 ENCOUNTER — TELEPHONE (OUTPATIENT)
Dept: FAMILY MEDICINE CLINIC | Facility: CLINIC | Age: 56
End: 2021-06-22

## 2021-07-08 DIAGNOSIS — Z86.718 HISTORY OF DVT (DEEP VEIN THROMBOSIS): ICD-10-CM

## 2021-07-08 DIAGNOSIS — D68.51 FACTOR V LEIDEN (HCC): Primary | ICD-10-CM

## 2021-07-08 LAB — INR PPP: 2.3 (ref 0.84–1.19)

## 2021-07-09 ENCOUNTER — ANTICOAG VISIT (OUTPATIENT)
Dept: FAMILY MEDICINE CLINIC | Facility: CLINIC | Age: 56
End: 2021-07-09

## 2021-07-09 LAB
INR PPP: 2.3
PROTHROMBIN TIME: 23.5 SEC (ref 9–11.5)

## 2021-08-04 ENCOUNTER — VBI (OUTPATIENT)
Dept: ADMINISTRATIVE | Facility: OTHER | Age: 56
End: 2021-08-04

## 2021-09-13 ENCOUNTER — VBI (OUTPATIENT)
Dept: ADMINISTRATIVE | Facility: OTHER | Age: 56
End: 2021-09-13

## 2021-10-19 ENCOUNTER — VBI (OUTPATIENT)
Dept: ADMINISTRATIVE | Facility: OTHER | Age: 56
End: 2021-10-19

## 2021-10-21 LAB
INR PPP: 2.7
PROTHROMBIN TIME: 27.2 SEC (ref 9–11.5)

## 2021-10-22 ENCOUNTER — ANTICOAG VISIT (OUTPATIENT)
Dept: FAMILY MEDICINE CLINIC | Facility: CLINIC | Age: 56
End: 2021-10-22

## 2021-11-05 ENCOUNTER — VBI (OUTPATIENT)
Dept: ADMINISTRATIVE | Facility: OTHER | Age: 56
End: 2021-11-05

## 2021-11-12 ENCOUNTER — OFFICE VISIT (OUTPATIENT)
Dept: FAMILY MEDICINE CLINIC | Facility: CLINIC | Age: 56
End: 2021-11-12
Payer: COMMERCIAL

## 2021-11-12 VITALS
HEIGHT: 66 IN | TEMPERATURE: 97.1 F | BODY MASS INDEX: 35.68 KG/M2 | HEART RATE: 78 BPM | SYSTOLIC BLOOD PRESSURE: 130 MMHG | DIASTOLIC BLOOD PRESSURE: 74 MMHG | WEIGHT: 222 LBS

## 2021-11-12 DIAGNOSIS — G93.3 POSTVIRAL FATIGUE SYNDROME: ICD-10-CM

## 2021-11-12 DIAGNOSIS — I26.09 OTHER PULMONARY EMBOLISM WITH ACUTE COR PULMONALE, UNSPECIFIED CHRONICITY (HCC): ICD-10-CM

## 2021-11-12 DIAGNOSIS — K91.2 POSTGASTRECTOMY MALABSORPTION: ICD-10-CM

## 2021-11-12 DIAGNOSIS — Z90.3 POSTGASTRECTOMY MALABSORPTION: ICD-10-CM

## 2021-11-12 DIAGNOSIS — Z86.718 HISTORY OF DVT (DEEP VEIN THROMBOSIS): ICD-10-CM

## 2021-11-12 DIAGNOSIS — E55.9 VITAMIN D DEFICIENCY: ICD-10-CM

## 2021-11-12 DIAGNOSIS — Z86.16 PERSONAL HISTORY OF COVID-19: ICD-10-CM

## 2021-11-12 DIAGNOSIS — I27.82 OTHER CHRONIC PULMONARY EMBOLISM WITHOUT ACUTE COR PULMONALE (HCC): Primary | ICD-10-CM

## 2021-11-12 DIAGNOSIS — Z12.31 ENCOUNTER FOR SCREENING MAMMOGRAM FOR MALIGNANT NEOPLASM OF BREAST: ICD-10-CM

## 2021-11-12 DIAGNOSIS — F43.23 ADJUSTMENT DISORDER WITH MIXED ANXIETY AND DEPRESSED MOOD: ICD-10-CM

## 2021-11-12 DIAGNOSIS — Z23 NEED FOR INFLUENZA VACCINATION: ICD-10-CM

## 2021-11-12 DIAGNOSIS — D68.51 FACTOR V LEIDEN (HCC): ICD-10-CM

## 2021-11-12 PROBLEM — J01.10 ACUTE NON-RECURRENT FRONTAL SINUSITIS: Status: RESOLVED | Noted: 2020-04-20 | Resolved: 2021-11-12

## 2021-11-12 PROCEDURE — 90682 RIV4 VACC RECOMBINANT DNA IM: CPT | Performed by: NURSE PRACTITIONER

## 2021-11-12 PROCEDURE — 90471 IMMUNIZATION ADMIN: CPT | Performed by: NURSE PRACTITIONER

## 2021-11-12 PROCEDURE — 99215 OFFICE O/P EST HI 40 MIN: CPT | Performed by: NURSE PRACTITIONER

## 2021-11-12 RX ORDER — CALCIUM CITRATE 1040MG
TABLET ORAL
COMMUNITY

## 2021-11-12 RX ORDER — WARFARIN SODIUM 5 MG/1
TABLET ORAL
Qty: 180 TABLET | Refills: 0 | Status: SHIPPED | OUTPATIENT
Start: 2021-11-12 | End: 2021-12-14

## 2021-11-12 RX ORDER — BUPROPION HYDROCHLORIDE 150 MG/1
150 TABLET ORAL EVERY MORNING
Qty: 90 TABLET | Refills: 1 | Status: SHIPPED | OUTPATIENT
Start: 2021-11-12 | End: 2022-05-11

## 2021-11-15 ENCOUNTER — TELEPHONE (OUTPATIENT)
Dept: HEMATOLOGY ONCOLOGY | Facility: CLINIC | Age: 56
End: 2021-11-15

## 2021-11-15 ENCOUNTER — TELEPHONE (OUTPATIENT)
Dept: ADMINISTRATIVE | Facility: OTHER | Age: 56
End: 2021-11-15

## 2021-11-17 NOTE — ASSESSMENT & PLAN NOTE
[facility] Internal Medicine    CONSULTATION / HISTORY AND PHYSICAL EXAMINATION            Date:   11/17/2021  Patient name:  Danna Dalal  Date of admission:  11/13/2021  7:23 AM  MRN:   [id number]  Account:  [de-identified]  YOB: 1961  PCP:    Esteban Monroy MD (Inactive)  Room:   [address] [place] [address]  Code Status:    Full Code    Physician Requesting Consult: Last Gaxiola MD    Reason for Consult:  medical management    Chief Complaint:     No chief complaint on file.  hypokalemia    History Obtained From:     Patient medical record nursing staff    History of Present Illness:     HTN  Onset more than 2 years ago  charlene mild to mod  Controlled with current po meds  Not associated with headaches or blurry vision  No chest pain    Low k  alchol abuse      Past Medical History:     Past Medical History:   Diagnosis Date    Alcoholic ([address]     pt drinks a fifth of whiskey and a case of beer daily    Bipolar 1 disorder [facility]     Hypertension     Paranoid schizophrenia ([facility]     PTSD (post-traumatic stress disorder)         Past Surgical History:     Past Surgical History:   Procedure Laterality Date    HERNIA REPAIR  1992    TONSILLECTOMY          Medications Prior to Admission:     Prior to Admission medications    Medication Sig Start Date End Date Taking?  Authorizing Provider   cloNIDine (CATAPRES) 0.2 MG tablet Take 1 tablet by mouth nightly 11/17/21  Yes Last Gaxiola MD   folic acid (FOLVITE) 1 MG tablet Take 1 tablet by mouth daily 11/18/21  Yes Last Gaxiola MD   hydrOXYzine (ATARAX) 50 MG tablet Take 1 tablet by mouth 3 times daily as needed for Anxiety 11/17/21  Yes Last Gaxiola MD   lisinopril (PRINIVIL;ZESTRIL) 20 MG tablet Take 1 tablet by mouth daily 11/17/21  Yes Last Gaxiola MD   OLANZapine (ZYPREXA) 15 MG tablet Take 1 tablet by mouth nightly 11/17/21  Yes Last Gaxiola MD   OLANZapine (ZYPREXA) 5 MG tablet Take 1 tablet by mouth 2 Same dose, recheck next week times daily at 0800 and 1400 11/17/21  Yes Joyce Edwards MD   sertraline (ZOLOFT) 100 MG tablet Take 1 tablet by mouth daily 11/17/21  Yes Joyce Edwards MD   Multiple Vitamins-Minerals (THERAPEUTIC MULTIVITAMIN-MINERALS) tablet Take 1 tablet by mouth daily 11/18/21  Yes Joyce Edwards MD   thiamine mononitrate (THIAMINE) 100 MG tablet Take 1 tablet by mouth daily 11/18/21  Yes Joyce Edwards MD   traZODone (DESYREL) 50 MG tablet Take 1 tablet by mouth nightly as needed for Sleep 11/17/21  Yes Joyce Edwards MD        Allergies:     Patient has no known allergies. Social History:     Tobacco:    reports that he has been smoking cigarettes. He started smoking about 32 years ago. He has a 90.00 pack-year smoking history. He has never used smokeless tobacco.  Alcohol:      reports current alcohol use. Drug Use:  reports current drug use. Drugs: Marijuana (Weed) and Cocaine. Family History:     Family History   Family history unknown: Yes       Review of Systems:     Positive and Negative as described in HPI. CONSTITUTIONAL:  negative for fevers, chills, sweats, fatigue, weight loss  HEENT:  negative for vision, hearing changes, runny nose, throat pain  RESPIRATORY:  negative for shortness of breath, cough, congestion, wheezing. CARDIOVASCULAR:  negative for chest pain, palpitations.   GASTROINTESTINAL:  negative for nausea, vomiting, diarrhea, constipation, change in bowel habits, abdominal pain   GENITOURINARY:  negative for difficulty of urination, burning with urination, frequency   INTEGUMENT:  negative for rash, skin lesions, easy bruising   HEMATOLOGIC/LYMPHATIC:  negative for swelling/edema   ALLERGIC/IMMUNOLOGIC:  negative for urticaria , itching  ENDOCRINE:  negative increase in drinking, increase in urination, hot or cold intolerance  MUSCULOSKELETAL:  negative joint pains, muscle aches, swelling of joints  NEUROLOGICAL:  negative for headaches, dizziness, lightheadedness, numbness, pain, tingling extremities  :      Physical Exam:     BP (!) 104/58   Pulse 72   Temp 96.9 °F (36.1 °C) (Temporal)   Resp 14   Ht 6' 1\" (1.854 m)   Wt 200 lb (90.7 kg)   BMI 26.39 kg/m²   Temp (24hrs), Av.3 °F (36.3 °C), Min:96.9 °F (36.1 °C), Max:97.7 °F (36.5 °C)    No results for input(s): POCGLU in the last 72 hours. No intake or output data in the 24 hours ending 21 1619    General Appearance:  alert, well appearing, and in no acute distress  Mental status: oriented to person, place, and time with normal affect  Head:  normocephalic, atraumatic. Eye: no icterus, redness, pupils equal and reactive, extraocular eye movements intact, conjunctiva clear  Ear: normal external ear, no discharge, hearing intact  Nose:  no drainage noted  Mouth: mucous membranes moist  Neck: supple, no carotid bruits, thyroid not palpable  Lungs: Bilateral equal air entry, clear to ausculation, no wheezing, rales or rhonchi, normal effort  Cardiovascular: normal rate, regular rhythm, no murmur, gallop, rub. Abdomen: Soft, nontender, nondistended, normal bowel sounds, no hepatomegaly or splenomegaly  Neurologic: There are no new focal motor or sensory deficits, normal muscle tone and bulk, no abnormal sensation, normal speech, cranial nerves II through XII grossly intact  Skin: No gross lesions, rashes, bruising or bleeding on exposed skin area  Extremities:  peripheral pulses palpable, no pedal edema or calf pain with palpation      Investigations:      Laboratory Testing:  No results found for this or any previous visit (from the past 24 hour(s)).         Consultations:   IP CONSULT TO SOCIAL WORK  IP CONSULT TO INTERNAL MEDICINE  Assessment :      Primary Problem  Schizoaffective disorder, depressive type [facility])    Active Hospital Problems    Diagnosis Date Noted    Schizoaffective disorder, depressive type ([facility] [F25.1] 2021    Alcohol dependence with withdrawal, uncomplicated (HCC) [J76.087]     PTSD (post-traumatic stress disorder) [F43.10] 09/21/2020       Plan:     Hypokalemia 3.7  Replace and recheck  Alcohol abuse ,uncomplicated withdrwal  Folic acid and thiamine oral  htn  lisinopirl 20    Temp normal  Error in documenting  Ok to [facility], MD  11/17/2021  4:19 PM    Copy sent to Dr. Colletta Economy, MD (Inactive)    Please note that this chart was generated using voice recognition Dragon dictation software. Although every effort was made to ensure the accuracy of this automated transcription, some errors in transcription may have occurred.

## 2021-11-21 LAB
25(OH)D3 SERPL-MCNC: 22 NG/ML (ref 30–100)
ALBUMIN SERPL-MCNC: 4.2 G/DL (ref 3.6–5.1)
ALBUMIN/GLOB SERPL: 1.4 (CALC) (ref 1–2.5)
ALP SERPL-CCNC: 80 U/L (ref 37–153)
ALT SERPL-CCNC: 13 U/L (ref 6–29)
ANA SER QL IF: NEGATIVE
AST SERPL-CCNC: 13 U/L (ref 10–35)
BASOPHILS # BLD AUTO: 31 CELLS/UL (ref 0–200)
BASOPHILS NFR BLD AUTO: 0.7 %
BILIRUB SERPL-MCNC: 0.7 MG/DL (ref 0.2–1.2)
BUN SERPL-MCNC: 13 MG/DL (ref 7–25)
BUN/CREAT SERPL: NORMAL (CALC) (ref 6–22)
CALCIUM SERPL-MCNC: 9.1 MG/DL (ref 8.6–10.4)
CHLORIDE SERPL-SCNC: 105 MMOL/L (ref 98–110)
CHOLEST SERPL-MCNC: 203 MG/DL
CHOLEST/HDLC SERPL: 4.1 (CALC)
CO2 SERPL-SCNC: 24 MMOL/L (ref 20–32)
CREAT SERPL-MCNC: 0.7 MG/DL (ref 0.5–1.05)
CRP SERPL-MCNC: 4.1 MG/L
EOSINOPHIL # BLD AUTO: 123 CELLS/UL (ref 15–500)
EOSINOPHIL NFR BLD AUTO: 2.8 %
ERYTHROCYTE [DISTWIDTH] IN BLOOD BY AUTOMATED COUNT: 16.1 % (ref 11–15)
FOLATE SERPL-MCNC: 13.8 NG/ML
GLOBULIN SER CALC-MCNC: 3.1 G/DL (CALC) (ref 1.9–3.7)
GLUCOSE SERPL-MCNC: 98 MG/DL (ref 65–99)
HCT VFR BLD AUTO: 37.2 % (ref 35–45)
HDLC SERPL-MCNC: 50 MG/DL
HGB BLD-MCNC: 11.7 G/DL (ref 11.7–15.5)
IRON SERPL-MCNC: 32 MCG/DL (ref 45–160)
LDLC SERPL CALC-MCNC: 120 MG/DL (CALC)
LYMPHOCYTES # BLD AUTO: 1505 CELLS/UL (ref 850–3900)
LYMPHOCYTES NFR BLD AUTO: 34.2 %
MAGNESIUM SERPL-MCNC: 2 MG/DL (ref 1.5–2.5)
MCH RBC QN AUTO: 24.2 PG (ref 27–33)
MCHC RBC AUTO-ENTMCNC: 31.5 G/DL (ref 32–36)
MCV RBC AUTO: 77 FL (ref 80–100)
MONOCYTES # BLD AUTO: 312 CELLS/UL (ref 200–950)
MONOCYTES NFR BLD AUTO: 7.1 %
NEUTROPHILS # BLD AUTO: 2429 CELLS/UL (ref 1500–7800)
NEUTROPHILS NFR BLD AUTO: 55.2 %
NONHDLC SERPL-MCNC: 153 MG/DL (CALC)
PLATELET # BLD AUTO: 255 THOUSAND/UL (ref 140–400)
PMV BLD REES-ECKER: 11 FL (ref 7.5–12.5)
POTASSIUM SERPL-SCNC: 4.2 MMOL/L (ref 3.5–5.3)
PROT SERPL-MCNC: 7.3 G/DL (ref 6.1–8.1)
RBC # BLD AUTO: 4.83 MILLION/UL (ref 3.8–5.1)
SL AMB EGFR AFRICAN AMERICAN: 115 ML/MIN/1.73M2
SL AMB EGFR NON AFRICAN AMERICAN: 99 ML/MIN/1.73M2
SODIUM SERPL-SCNC: 138 MMOL/L (ref 135–146)
TRIGL SERPL-MCNC: 211 MG/DL
TSH SERPL-ACNC: 1.71 MIU/L
VIT A SERPL-MCNC: 51 MCG/DL (ref 38–98)
VIT B1 BLD-SCNC: 109 NMOL/L (ref 78–185)
VIT B12 SERPL-MCNC: 631 PG/ML (ref 200–1100)
WBC # BLD AUTO: 4.4 THOUSAND/UL (ref 3.8–10.8)

## 2021-11-30 ENCOUNTER — ANNUAL EXAM (OUTPATIENT)
Dept: OBGYN CLINIC | Facility: CLINIC | Age: 56
End: 2021-11-30
Payer: COMMERCIAL

## 2021-11-30 VITALS
HEART RATE: 75 BPM | DIASTOLIC BLOOD PRESSURE: 82 MMHG | SYSTOLIC BLOOD PRESSURE: 130 MMHG | BODY MASS INDEX: 35.83 KG/M2 | HEIGHT: 66 IN

## 2021-11-30 DIAGNOSIS — Z12.4 ENCOUNTER FOR PAPANICOLAOU SMEAR FOR CERVICAL CANCER SCREENING: ICD-10-CM

## 2021-11-30 DIAGNOSIS — Z11.51 SCREENING FOR HPV (HUMAN PAPILLOMAVIRUS): ICD-10-CM

## 2021-11-30 DIAGNOSIS — Z01.411 ENCOUNTER FOR GYNECOLOGICAL EXAMINATION WITH ABNORMAL FINDING: Primary | ICD-10-CM

## 2021-11-30 DIAGNOSIS — Z12.31 ENCOUNTER FOR SCREENING MAMMOGRAM FOR BREAST CANCER: ICD-10-CM

## 2021-11-30 DIAGNOSIS — Z30.431 SURVEILLANCE OF INTRAUTERINE CONTRACEPTIVE DEVICE: ICD-10-CM

## 2021-11-30 DIAGNOSIS — N92.1 MENORRHAGIA WITH IRREGULAR CYCLE: ICD-10-CM

## 2021-11-30 PROCEDURE — S0610 ANNUAL GYNECOLOGICAL EXAMINA: HCPCS | Performed by: NURSE PRACTITIONER

## 2021-11-30 PROCEDURE — G0145 SCR C/V CYTO,THINLAYER,RESCR: HCPCS | Performed by: NURSE PRACTITIONER

## 2021-11-30 PROCEDURE — G0476 HPV COMBO ASSAY CA SCREEN: HCPCS | Performed by: NURSE PRACTITIONER

## 2021-12-01 ENCOUNTER — EVALUATION (OUTPATIENT)
Dept: PHYSICAL THERAPY | Facility: REHABILITATION | Age: 56
End: 2021-12-01
Payer: COMMERCIAL

## 2021-12-01 DIAGNOSIS — U09.9 COVID-19 LONG HAULER MANIFESTING CHRONIC DECREASED MOBILITY AND ENDURANCE: ICD-10-CM

## 2021-12-01 DIAGNOSIS — G93.3 POSTVIRAL FATIGUE SYNDROME: ICD-10-CM

## 2021-12-01 DIAGNOSIS — Z74.09 COVID-19 LONG HAULER MANIFESTING CHRONIC DECREASED MOBILITY AND ENDURANCE: ICD-10-CM

## 2021-12-01 DIAGNOSIS — R53.82 POST-COVID-19 SYNDROME MANIFESTING AS CHRONIC FATIGUE: Primary | ICD-10-CM

## 2021-12-01 DIAGNOSIS — U09.9 POST-COVID-19 SYNDROME MANIFESTING AS CHRONIC FATIGUE: Primary | ICD-10-CM

## 2021-12-01 DIAGNOSIS — R26.9 GAIT DISTURBANCE: ICD-10-CM

## 2021-12-01 DIAGNOSIS — Z86.16 PERSONAL HISTORY OF COVID-19: ICD-10-CM

## 2021-12-01 PROCEDURE — 97163 PT EVAL HIGH COMPLEX 45 MIN: CPT

## 2021-12-02 ENCOUNTER — TELEPHONE (OUTPATIENT)
Dept: PSYCHIATRY | Facility: CLINIC | Age: 56
End: 2021-12-02

## 2021-12-02 LAB
HPV HR 12 DNA CVX QL NAA+PROBE: NEGATIVE
HPV16 DNA CVX QL NAA+PROBE: NEGATIVE
HPV18 DNA CVX QL NAA+PROBE: NEGATIVE

## 2021-12-07 LAB
LAB AP GYN PRIMARY INTERPRETATION: NORMAL
Lab: NORMAL

## 2021-12-08 ENCOUNTER — ANTICOAG VISIT (OUTPATIENT)
Dept: FAMILY MEDICINE CLINIC | Facility: CLINIC | Age: 56
End: 2021-12-08

## 2021-12-08 ENCOUNTER — OFFICE VISIT (OUTPATIENT)
Dept: PHYSICAL THERAPY | Facility: REHABILITATION | Age: 56
End: 2021-12-08
Payer: COMMERCIAL

## 2021-12-08 DIAGNOSIS — U09.9 COVID-19 LONG HAULER MANIFESTING CHRONIC DECREASED MOBILITY AND ENDURANCE: ICD-10-CM

## 2021-12-08 DIAGNOSIS — R53.82 POST-COVID-19 SYNDROME MANIFESTING AS CHRONIC FATIGUE: Primary | ICD-10-CM

## 2021-12-08 DIAGNOSIS — U09.9 POST-COVID-19 SYNDROME MANIFESTING AS CHRONIC FATIGUE: Primary | ICD-10-CM

## 2021-12-08 DIAGNOSIS — R26.9 GAIT DISTURBANCE: ICD-10-CM

## 2021-12-08 DIAGNOSIS — Z74.09 COVID-19 LONG HAULER MANIFESTING CHRONIC DECREASED MOBILITY AND ENDURANCE: ICD-10-CM

## 2021-12-08 DIAGNOSIS — G93.3 POSTVIRAL FATIGUE SYNDROME: ICD-10-CM

## 2021-12-08 LAB
INR PPP: 6.3
PROTHROMBIN TIME: 62.6 SEC (ref 9–11.5)

## 2021-12-08 PROCEDURE — 97110 THERAPEUTIC EXERCISES: CPT

## 2021-12-09 ENCOUNTER — TELEPHONE (OUTPATIENT)
Dept: FAMILY MEDICINE CLINIC | Facility: CLINIC | Age: 56
End: 2021-12-09

## 2021-12-10 ENCOUNTER — VBI (OUTPATIENT)
Dept: ADMINISTRATIVE | Facility: OTHER | Age: 56
End: 2021-12-10

## 2021-12-10 LAB
INR PPP: 5.2
PROTHROMBIN TIME: 52.2 SEC (ref 9–11.5)

## 2021-12-11 ENCOUNTER — TELEPHONE (OUTPATIENT)
Dept: OTHER | Facility: OTHER | Age: 56
End: 2021-12-11

## 2021-12-11 ENCOUNTER — ANTICOAG VISIT (OUTPATIENT)
Dept: FAMILY MEDICINE CLINIC | Facility: CLINIC | Age: 56
End: 2021-12-11

## 2021-12-13 ENCOUNTER — APPOINTMENT (OUTPATIENT)
Dept: PHYSICAL THERAPY | Facility: REHABILITATION | Age: 56
End: 2021-12-13
Payer: COMMERCIAL

## 2021-12-13 ENCOUNTER — RA CDI HCC (OUTPATIENT)
Dept: OTHER | Facility: HOSPITAL | Age: 56
End: 2021-12-13

## 2021-12-14 ENCOUNTER — OFFICE VISIT (OUTPATIENT)
Dept: PHYSICAL THERAPY | Facility: REHABILITATION | Age: 56
End: 2021-12-14
Payer: COMMERCIAL

## 2021-12-14 ENCOUNTER — CONSULT (OUTPATIENT)
Dept: HEMATOLOGY ONCOLOGY | Facility: CLINIC | Age: 56
End: 2021-12-14
Payer: COMMERCIAL

## 2021-12-14 VITALS
BODY MASS INDEX: 35.83 KG/M2 | HEIGHT: 66 IN | HEART RATE: 89 BPM | RESPIRATION RATE: 17 BRPM | TEMPERATURE: 97.6 F | DIASTOLIC BLOOD PRESSURE: 88 MMHG | SYSTOLIC BLOOD PRESSURE: 136 MMHG | OXYGEN SATURATION: 98 %

## 2021-12-14 DIAGNOSIS — Z74.09 COVID-19 LONG HAULER MANIFESTING CHRONIC DECREASED MOBILITY AND ENDURANCE: ICD-10-CM

## 2021-12-14 DIAGNOSIS — Z86.718 HISTORY OF DVT (DEEP VEIN THROMBOSIS): ICD-10-CM

## 2021-12-14 DIAGNOSIS — I27.82 OTHER CHRONIC PULMONARY EMBOLISM WITHOUT ACUTE COR PULMONALE (HCC): ICD-10-CM

## 2021-12-14 DIAGNOSIS — D68.51 FACTOR V LEIDEN (HCC): ICD-10-CM

## 2021-12-14 DIAGNOSIS — Z86.16 PERSONAL HISTORY OF COVID-19: ICD-10-CM

## 2021-12-14 DIAGNOSIS — U09.9 COVID-19 LONG HAULER MANIFESTING CHRONIC DECREASED MOBILITY AND ENDURANCE: ICD-10-CM

## 2021-12-14 DIAGNOSIS — R26.9 GAIT DISTURBANCE: Primary | ICD-10-CM

## 2021-12-14 DIAGNOSIS — G93.3 POSTVIRAL FATIGUE SYNDROME: ICD-10-CM

## 2021-12-14 DIAGNOSIS — R53.82 POST-COVID-19 SYNDROME MANIFESTING AS CHRONIC FATIGUE: ICD-10-CM

## 2021-12-14 DIAGNOSIS — U09.9 POST-COVID-19 SYNDROME MANIFESTING AS CHRONIC FATIGUE: ICD-10-CM

## 2021-12-14 PROCEDURE — 99244 OFF/OP CNSLTJ NEW/EST MOD 40: CPT | Performed by: PHYSICIAN ASSISTANT

## 2021-12-14 PROCEDURE — 97110 THERAPEUTIC EXERCISES: CPT | Performed by: PHYSICAL THERAPIST

## 2021-12-16 ENCOUNTER — APPOINTMENT (OUTPATIENT)
Dept: PHYSICAL THERAPY | Facility: REHABILITATION | Age: 56
End: 2021-12-16
Payer: COMMERCIAL

## 2021-12-17 ENCOUNTER — OFFICE VISIT (OUTPATIENT)
Dept: PHYSICAL THERAPY | Facility: REHABILITATION | Age: 56
End: 2021-12-17
Payer: COMMERCIAL

## 2021-12-17 ENCOUNTER — APPOINTMENT (OUTPATIENT)
Dept: PHYSICAL THERAPY | Facility: REHABILITATION | Age: 56
End: 2021-12-17
Payer: COMMERCIAL

## 2021-12-17 DIAGNOSIS — G93.3 POSTVIRAL FATIGUE SYNDROME: ICD-10-CM

## 2021-12-17 DIAGNOSIS — U09.9 COVID-19 LONG HAULER MANIFESTING CHRONIC DECREASED MOBILITY AND ENDURANCE: ICD-10-CM

## 2021-12-17 DIAGNOSIS — R26.9 GAIT DISTURBANCE: Primary | ICD-10-CM

## 2021-12-17 DIAGNOSIS — U09.9 POST-COVID-19 SYNDROME MANIFESTING AS CHRONIC FATIGUE: ICD-10-CM

## 2021-12-17 DIAGNOSIS — Z86.16 PERSONAL HISTORY OF COVID-19: ICD-10-CM

## 2021-12-17 DIAGNOSIS — R53.82 POST-COVID-19 SYNDROME MANIFESTING AS CHRONIC FATIGUE: ICD-10-CM

## 2021-12-17 DIAGNOSIS — Z74.09 COVID-19 LONG HAULER MANIFESTING CHRONIC DECREASED MOBILITY AND ENDURANCE: ICD-10-CM

## 2021-12-17 PROCEDURE — 97110 THERAPEUTIC EXERCISES: CPT

## 2021-12-20 ENCOUNTER — OFFICE VISIT (OUTPATIENT)
Dept: FAMILY MEDICINE CLINIC | Facility: CLINIC | Age: 56
End: 2021-12-20
Payer: COMMERCIAL

## 2021-12-20 ENCOUNTER — PROCEDURE VISIT (OUTPATIENT)
Dept: OBGYN CLINIC | Facility: CLINIC | Age: 56
End: 2021-12-20
Payer: COMMERCIAL

## 2021-12-20 VITALS
HEIGHT: 66 IN | BODY MASS INDEX: 36.45 KG/M2 | WEIGHT: 226.8 LBS | DIASTOLIC BLOOD PRESSURE: 84 MMHG | SYSTOLIC BLOOD PRESSURE: 122 MMHG

## 2021-12-20 VITALS
OXYGEN SATURATION: 97 % | SYSTOLIC BLOOD PRESSURE: 124 MMHG | TEMPERATURE: 97.3 F | DIASTOLIC BLOOD PRESSURE: 82 MMHG | HEIGHT: 66 IN | WEIGHT: 226.38 LBS | HEART RATE: 72 BPM | BODY MASS INDEX: 36.38 KG/M2

## 2021-12-20 DIAGNOSIS — D68.51 FACTOR V LEIDEN (HCC): Primary | ICD-10-CM

## 2021-12-20 DIAGNOSIS — Z86.16 PERSONAL HISTORY OF COVID-19: ICD-10-CM

## 2021-12-20 DIAGNOSIS — N92.1 MENORRHAGIA WITH IRREGULAR CYCLE: Primary | ICD-10-CM

## 2021-12-20 DIAGNOSIS — I27.82 OTHER CHRONIC PULMONARY EMBOLISM WITHOUT ACUTE COR PULMONALE (HCC): ICD-10-CM

## 2021-12-20 PROCEDURE — 3008F BODY MASS INDEX DOCD: CPT | Performed by: NURSE PRACTITIONER

## 2021-12-20 PROCEDURE — 99214 OFFICE O/P EST MOD 30 MIN: CPT | Performed by: NURSE PRACTITIONER

## 2021-12-20 PROCEDURE — 58100 BIOPSY OF UTERUS LINING: CPT | Performed by: NURSE PRACTITIONER

## 2021-12-20 PROCEDURE — 1036F TOBACCO NON-USER: CPT | Performed by: NURSE PRACTITIONER

## 2021-12-20 PROCEDURE — 88305 TISSUE EXAM BY PATHOLOGIST: CPT | Performed by: PATHOLOGY

## 2021-12-21 ENCOUNTER — APPOINTMENT (OUTPATIENT)
Dept: PHYSICAL THERAPY | Facility: REHABILITATION | Age: 56
End: 2021-12-21
Payer: COMMERCIAL

## 2021-12-22 ENCOUNTER — HOSPITAL ENCOUNTER (OUTPATIENT)
Dept: ULTRASOUND IMAGING | Facility: MEDICAL CENTER | Age: 56
Discharge: HOME | End: 2021-12-22
Payer: COMMERCIAL

## 2021-12-22 DIAGNOSIS — N92.1 MENORRHAGIA WITH IRREGULAR CYCLE: ICD-10-CM

## 2021-12-22 PROCEDURE — 76830 TRANSVAGINAL US NON-OB: CPT

## 2021-12-22 PROCEDURE — 76856 US EXAM PELVIC COMPLETE: CPT

## 2021-12-23 ENCOUNTER — OFFICE VISIT (OUTPATIENT)
Dept: PHYSICAL THERAPY | Facility: REHABILITATION | Age: 56
End: 2021-12-23
Payer: COMMERCIAL

## 2021-12-23 DIAGNOSIS — Z86.16 PERSONAL HISTORY OF COVID-19: ICD-10-CM

## 2021-12-23 DIAGNOSIS — G93.3 POSTVIRAL FATIGUE SYNDROME: ICD-10-CM

## 2021-12-23 DIAGNOSIS — R26.9 GAIT DISTURBANCE: Primary | ICD-10-CM

## 2021-12-23 DIAGNOSIS — U09.9 POST-COVID-19 SYNDROME MANIFESTING AS CHRONIC FATIGUE: ICD-10-CM

## 2021-12-23 DIAGNOSIS — R53.82 POST-COVID-19 SYNDROME MANIFESTING AS CHRONIC FATIGUE: ICD-10-CM

## 2021-12-23 PROCEDURE — 97110 THERAPEUTIC EXERCISES: CPT

## 2022-01-05 ENCOUNTER — OFFICE VISIT (OUTPATIENT)
Dept: OBGYN CLINIC | Facility: CLINIC | Age: 57
End: 2022-01-05
Payer: COMMERCIAL

## 2022-01-05 VITALS — DIASTOLIC BLOOD PRESSURE: 78 MMHG | SYSTOLIC BLOOD PRESSURE: 126 MMHG | BODY MASS INDEX: 36.54 KG/M2 | HEIGHT: 66 IN

## 2022-01-05 DIAGNOSIS — N92.1 MENORRHAGIA WITH IRREGULAR CYCLE: Primary | ICD-10-CM

## 2022-01-05 DIAGNOSIS — Z86.711 HISTORY OF PULMONARY EMBOLUS (PE): ICD-10-CM

## 2022-01-05 DIAGNOSIS — D25.0 FIBROIDS, SUBMUCOSAL: ICD-10-CM

## 2022-01-05 DIAGNOSIS — D68.9 CLOTTING DISORDER (HCC): ICD-10-CM

## 2022-01-05 PROCEDURE — 1036F TOBACCO NON-USER: CPT | Performed by: OBSTETRICS & GYNECOLOGY

## 2022-01-05 PROCEDURE — 99214 OFFICE O/P EST MOD 30 MIN: CPT | Performed by: OBSTETRICS & GYNECOLOGY

## 2022-01-05 NOTE — PROGRESS NOTES
Patient is a 49 y o  Tiana Gates with Patient's last menstrual period was 12/27/2021  who presents to discuss her recent ultrasound and management options  Pt was seen by NICHELLE Gutierrez for irregular menses with menorrhagia  Pt underwent TSH on 11/16/2021 by her PCP which was normal at 1 71  She had a pevic ultrasound ordered and Ebx performed by NICHELLE Gutierrez at the end of December  Pt reports that she had no menses x 11 months and then she developed COVID in 2/2021  After that her periods resumed and became very irregular  She reports that she began to bleed every 18-21 days and her bleeding lasts 6-7 days and is heavy  She reports prior this and prior to skipping 11 months of menses she had menses every 28 days that lasted for 4-5 days  Pt also reports that she has symptoms related to her cycle She feels good after period for 1 week  Then she gets bloating and develops right sided pain in her lower abdomen that wraps around to her back and comes down her leg like sciatica until she gets her next menses  The pain is exacerbated by her bloating  She reports it feels like the sciatica at the end of her pregnancy many years ago  Pt has started PT for her sciatic pain and reports her PCP checked and she does not have a herniated disc  Pt reports she just finished her period so she does not have any of the above symptoms today  Reviewed with patient that her Ebx is benign and her pelvic u/s revelas and enlarged uterus with many fibroids  2 of these fibroids have a submucosal component which likely is the source of her heavy bleeding  Her ET is normal  Her IUD appears to be appropriately located  Of significance, pt has a h o DVT and PE associated with ALTON use and she currently takes Xarelto  She aso has factor V leiden mutation   Reviewed with patient she is not a candidate for any estrogen containing medications or TXA due to her h o DVT and PE and her clotting disorder   Reviewed our options for management are limited to progesterone only therapy or surgery  Reviewed with patient we could remove her paragard and replace it with a Mirena and see if this is enough to decrease her bleeding, although her fibroids may make placement more difficult or treatment may not be adequate  We also reviewed that she could undergo D&C, hysteroscopy, submucosal myomectomy with either Mirena placement or endometrial ablation  We reviewed the procedures and risks and benefits of each  After careful thought, the patient would like to avoid the risks associated with surgery and would like to start with removal of paragard and replace with Mirena  Pt notified of bleeding profile with irregular bleeding for 3-6 months, risks of uterine perforation, infection, expulsion, inadequate treatment of her bleeding, failure, ectopic pregnancy and she desires to proceed  She reports that if her bleeding does not improve significantly she will consider surgical management  Past Medical History:   Diagnosis Date    Abnormal Pap smear of cervix 05/2016 5/2016 LGSIL, + other HPV; 6/2016 Colpo JANA 1  12/2021 pap neg/ HPV neg   Anemia     DVT (deep venous thrombosis) (HCC)     Factor V Leiden mutation (HCC)     GERD (gastroesophageal reflux disease)     Obesity, Class II, BMI 35-39 9     PONV (postoperative nausea and vomiting)     Pulmonary embolism (HCC)     S/P gastric surgery     gastric sleeve     Sleep apnea     wears c-pap, Last assessed: 3/7/14       Past Surgical History:   Procedure Laterality Date    CHOLECYSTECTOMY      COLONOSCOPY      complete, resolved: 1996    EGD AND COLONOSCOPY N/A 3/8/2018    Procedure: EGD AND COLONOSCOPY;  Surgeon: Syed Ricardo MD;  Location: [facility] GI [facility];   Service: Gastroenterology    GALLBLADDER SURGERY      IVC FILTER INSERTION  04/01/2014    Radiologic Supervision: Felicitas Filter Placement in IVC    MOUTH SURGERY      STOMACH SURGERY  04/01/2014    sleeve, Gastric surgery for Morbid Obesity Laparoscopic Longitudinal Gastrectomy, per allscripts       OB History    Para Term  AB Living   2 1 1 0 1 1   SAB IAB Ectopic Multiple Live Births   1 0 0 0 1      # Outcome Date GA Lbr Jeff/2nd Weight Sex Delivery Anes PTL Lv   2 SAB            1 Term 10/25/93 40w0d   M Vag-Spont   TRISTON      Obstetric Comments   Menarche 12   Perimenopausal           Current Outpatient Medications:     BIOTIN PO, Take by mouth, Disp: , Rfl:     buPROPion (WELLBUTRIN XL) 150 mg 24 hr tablet, Take 1 tablet (150 mg total) by mouth every morning, Disp: 90 tablet, Rfl: 1    Calcium Citrate 1040 MG TABS, Take by mouth, Disp: , Rfl:     Cyanocobalamin (VITAMIN B-12) 1000 MCG SUBL, Place 1 tablet under the tongue daily, Disp: , Rfl:     ferrous sulfate 325 (65 Fe) mg tablet, Take 600 mg by mouth daily with breakfast, Disp: , Rfl:     Multiple Vitamin (MULTIVITAMIN) capsule, Take 1 capsule by mouth daily, Disp: , Rfl:     PARAGARD INTRAUTERINE COPPER IU, by Intrauterine route, Disp: , Rfl:     rivaroxaban (Xarelto) 10 mg tablet, Take 1 tablet (10 mg total) by mouth daily, Disp: 90 tablet, Rfl: 2    Allergies   Allergen Reactions    Pseudoephedrine Tachycardia    Latex Rash       Social History     Socioeconomic History    Marital status: Single     Spouse name: None    Number of children: 1    Years of education: None    Highest education level: None   Occupational History    None   Tobacco Use    Smoking status: Never Smoker    Smokeless tobacco: Never Used    Tobacco comment: current non-smoker, per allscripts   Vaping Use    Vaping Use: Never used   Substance and Sexual Activity    Alcohol use: Yes     Comment: social    Drug use: No    Sexual activity: Yes     Partners: Male     Birth control/protection: I U D     Other Topics Concern    None   Social History Narrative    , per allscripts    Exercise frequency (times/week)     Social Determinants of Health     Financial Resource Strain: Not on file   Food Insecurity: Not on file   Transportation Needs: Not on file   Physical Activity: Not on file   Stress: Not on file   Social Connections: Not on file   Intimate Partner Violence: Not on file   Housing Stability: Not on file       Family History   Problem Relation Age of Onset    Osteoporosis Mother     Diabetes Father     Heart disease Father     Arthritis Father     Heart failure Father     Hypertension Father     Ovarian cancer Maternal Grandmother     Colon cancer Neg Hx     Breast cancer Neg Hx     Uterine cancer Neg Hx        Review of Systems   Constitutional: Negative for chills, fatigue, fever and unexpected weight change  HENT: Negative for congestion, mouth sores and sore throat  Respiratory: Negative for cough, chest tightness, shortness of breath and wheezing  Cardiovascular: Negative for chest pain and palpitations  Gastrointestinal: Positive for abdominal distention and abdominal pain  Negative for constipation, diarrhea, nausea and vomiting  Endocrine: Negative for cold intolerance and heat intolerance  Genitourinary: Positive for menstrual problem and pelvic pain  Negative for dyspareunia, dysuria, genital sores, vaginal bleeding, vaginal discharge and vaginal pain  Musculoskeletal: Positive for back pain  Negative for arthralgias  Sciatica of right leg   Skin: Negative for color change and rash  Neurological: Negative for dizziness, light-headedness and headaches  Hematological: Negative for adenopathy  Blood pressure 126/78, height 5' 6" (1 676 m), last menstrual period 12/27/2021, not currently breastfeeding  and Body mass index is 36 54 kg/m²  Physical Exam  Constitutional:       Appearance: Normal appearance  She is obese  HENT:      Head: Normocephalic and atraumatic  Eyes:      Extraocular Movements: Extraocular movements intact        Conjunctiva/sclera: Conjunctivae normal    Pulmonary:      Effort: Pulmonary effort is normal    Musculoskeletal:      Cervical back: Normal range of motion  Skin:     General: Skin is warm  Neurological:      Mental Status: She is alert and oriented to person, place, and time  Psychiatric:         Mood and Affect: Mood normal          Behavior: Behavior normal          Thought Content: Thought content normal          Judgment: Judgment normal            A/P:  Pt is a 49 y o   with      Estefania was seen today for consult  Diagnoses and all orders for this visit:    Menorrhagia with irregular cycle    Fibroids, submucosal    Clotting disorder ([address] )    History of pulmonary embolus (PE)    See HPI--pt has decided to proceed with paragard removal and mirena placement   If fails this treatment regimen she is will to consider D&C, hysteroscopy, submucosal myomectomy with either mirena placement or endometrial ablation

## 2022-01-11 ENCOUNTER — HOSPITAL ENCOUNTER (OUTPATIENT)
Dept: MAMMOGRAPHY | Facility: MEDICAL CENTER | Age: 57
Discharge: HOME | End: 2022-01-11
Payer: COMMERCIAL

## 2022-01-11 VITALS — BODY MASS INDEX: 36.49 KG/M2 | HEIGHT: 66 IN | WEIGHT: 227.07 LBS

## 2022-01-11 DIAGNOSIS — Z12.31 ENCOUNTER FOR SCREENING MAMMOGRAM FOR MALIGNANT NEOPLASM OF BREAST: ICD-10-CM

## 2022-01-11 PROCEDURE — 77067 SCR MAMMO BI INCL CAD: CPT

## 2022-01-11 PROCEDURE — 77063 BREAST TOMOSYNTHESIS BI: CPT

## 2022-01-24 ENCOUNTER — PROCEDURE VISIT (OUTPATIENT)
Dept: OBGYN CLINIC | Facility: CLINIC | Age: 57
End: 2022-01-24
Payer: COMMERCIAL

## 2022-01-24 VITALS
WEIGHT: 225.6 LBS | HEIGHT: 66 IN | DIASTOLIC BLOOD PRESSURE: 86 MMHG | BODY MASS INDEX: 36.26 KG/M2 | SYSTOLIC BLOOD PRESSURE: 124 MMHG

## 2022-01-24 DIAGNOSIS — Z30.433 ENCOUNTER FOR REMOVAL AND REINSERTION OF INTRAUTERINE CONTRACEPTIVE DEVICE (IUD): Primary | ICD-10-CM

## 2022-01-24 PROCEDURE — 3008F BODY MASS INDEX DOCD: CPT | Performed by: OBSTETRICS & GYNECOLOGY

## 2022-01-24 PROCEDURE — 58300 INSERT INTRAUTERINE DEVICE: CPT | Performed by: OBSTETRICS & GYNECOLOGY

## 2022-01-24 PROCEDURE — 58301 REMOVE INTRAUTERINE DEVICE: CPT | Performed by: OBSTETRICS & GYNECOLOGY

## 2022-01-24 NOTE — PROGRESS NOTES
Iud removal    Date/Time: 1/24/2022 1:09 PM  Performed by: Adriana Anna MD  Authorized by: Adriana Anna MD   Universal Protocol:  Consent: Verbal consent obtained  Written consent obtained  Risks and benefits: risks, benefits and alternatives were discussed  Consent given by: patient  Time out: Immediately prior to procedure a "time out" was called to verify the correct patient, procedure, equipment, support staff and site/side marked as required  Patient understanding: patient states understanding of the procedure being performed  Patient consent: the patient's understanding of the procedure matches consent given  Procedure consent: procedure consent matches procedure scheduled  Relevant documents: relevant documents present and verified  Test results: test results available and properly labeled  Required items: required blood products, implants, devices, and special equipment available  Patient identity confirmed: verbally with patient      Procedure:     Removed with no complications: yes      Removal due to mechanical complications of IUD: no      Removal due to infection and inflammatory reaction: no      Other reason for removal:  AUB due to anticoagualant and has paragard  Iud insertions    Date/Time: 1/24/2022 1:10 PM  Performed by: Adriana Anna MD  Authorized by: Adriana Anna MD   Universal Protocol:  Consent: Verbal consent obtained  Written consent obtained  Risks and benefits: risks, benefits and alternatives were discussed  Consent given by: patient  Time out: Immediately prior to procedure a "time out" was called to verify the correct patient, procedure, equipment, support staff and site/side marked as required    Patient understanding: patient states understanding of the procedure being performed  Patient consent: the patient's understanding of the procedure matches consent given  Procedure consent: procedure consent matches procedure scheduled  Relevant documents: relevant documents present and verified  Test results: test results available and properly labeled  Required items: required blood products, implants, devices, and special equipment available  Patient identity confirmed: verbally with patient        Procedure:     Pelvic exam performed: yes      Cervix cleaned and prepped: yes      Speculum placed in vagina: yes      Tenaculum applied to cervix: yes Kwameana Hutchinson)      Uterus sounded: yes      Uterus sound depth (cm):  8    IUD inserted with no complications: yes      IUD type:  Mirena    Strings trimmed: yes    Post-procedure:     Patient tolerated procedure well: yes      Patient will follow up after next period: yes

## 2022-01-27 ENCOUNTER — TELEPHONE (OUTPATIENT)
Dept: PSYCHIATRY | Facility: CLINIC | Age: 57
End: 2022-01-27

## 2022-07-14 NOTE — TELEPHONE ENCOUNTER
INR addressed by other MD Subjective   38-year-old female past medical history of atrial fibrillation chronically on anticoagulation, anemia, anxiety, asthma, ovarian cancer, depression, diabetes, DVT, factor V Leiden mutation, GERD, gout, migraines, varicella, TIA, PCOS, and PE presents to the emergency room with left flank pain for the past 3 days.  Patient states that she has a known left-sided kidney stones and has been seen by urology at our facility and has a planned procedure by Dr. Martinez in [place] for further resolution.  She states that she is here today for the pain as states she cannot get it under control.  She denies fever, chills, body aches.  Aggravating factors do include movement.  Denies any alleviating factors.  Denies any other complaints or concerns at this time.      History provided by:  Patient   used: No        Review of Systems   Constitutional: Negative.  Negative for fever.   HENT: Negative.    Respiratory: Negative.    Cardiovascular: Negative.  Negative for chest pain.   Gastrointestinal: Negative.  Negative for abdominal pain.   Endocrine: Negative.    Genitourinary: Positive for flank pain. Negative for dysuria.   Skin: Negative.    Neurological: Negative.    Psychiatric/Behavioral: Negative.    All other systems reviewed and are negative.      Past Medical History:   Diagnosis Date   • A-fib (HCC)    • Abnormal ECG    • Anemia    • Anxiety    • Asthma    • Cancer (HCC)     Ovarian   • Depression    • Diabetes mellitus (HCC)    • DVT (deep venous thrombosis) (HCC)    • Factor 5 Leiden mutation, heterozygous (HCC)    • Fibroid    • GERD (gastroesophageal reflux disease)    • Gout    • H/O abdominal abscess    • History of sepsis    • History of transfusion    • Hyperlipidemia    • Hypothyroid    • Kidney stone    • Migraines    • Neuropathy    • Ovarian cancer (HCC) 02/2021   • Ovarian cyst    • PE (pulmonary embolism)    • Polycystic ovary syndrome    • Preeclampsia    • Rh incompatibility     • Stroke (HCC)    • TIA (transient ischemic attack)    • Urinary tract infection    • Varicella        Allergies   Allergen Reactions   • Toradol [Ketorolac Tromethamine] Anaphylaxis and Hives   • Haldol [Haloperidol] Hives and Mental Status Change   • Tramadol Hives and Swelling   • Amoxicillin Hives and Rash   • Penicillins Hives and Rash       Past Surgical History:   Procedure Laterality Date   • ABDOMINAL SURGERY     • CARDIAC CATHETERIZATION     •  SECTION     • CHOLECYSTECTOMY     • COLONOSCOPY     • ENDOSCOPY     • EXTRACORPOREAL SHOCK WAVE LITHOTRIPSY (ESWL) Left 10/22/2021    Procedure: EXTRACORPOREAL SHOCKWAVE LITHOTRIPSY;  Surgeon: Milan Motley MD;  Location: [facility];  Service: Urology;  Laterality: Left;   • LITHOTRIPSY     • RIGHT OOPHORECTOMY     • URETEROSCOPY LASER LITHOTRIPSY WITH STENT INSERTION Left 10/01/2021    Procedure: URETEROSCOPY WITH STENT PLACEMENT;  Surgeon: Milan Motley MD;  Location: [facility];  Service: Urology;  Laterality: Left;   • URETEROSCOPY LASER LITHOTRIPSY WITH STENT INSERTION Left 2022    Procedure: URETEROSCOPY STENT REMOVAL;  Surgeon: Milan Motley MD;  Location: [facility];  Service: Urology;  Laterality: Left;   • URETEROSCOPY LASER LITHOTRIPSY WITH STENT INSERTION Left 2022    Procedure: CYSTOSCOPY RETROGRADE LEFT WITH STENT PLACEMENT;  Surgeon: Milan Motley MD;  Location: [facility];  Service: Urology;  Laterality: Left;       Family History   Problem Relation Age of Onset   • Hypertension Mother    • Diabetes Father    • Hypertension Father    • Heart attack Father    • No Known Problems Sister    • No Known Problems Brother    • No Known Problems Son    • No Known Problems Daughter    • No Known Problems Maternal Grandmother    • No Known Problems Maternal Grandfather    • No Known Problems Paternal Grandmother    • No Known Problems Paternal Grandfather    • No Known Problems Cousin    • Rheum  "arthritis Neg Hx    • Osteoarthritis Neg Hx    • Asthma Neg Hx    • Heart failure Neg Hx    • Hyperlipidemia Neg Hx    • Migraines Neg Hx    • Rashes / Skin problems Neg Hx    • Seizures Neg Hx    • Stroke Neg Hx    • Thyroid disease Neg Hx        Social History     Socioeconomic History   • Marital status:    Tobacco Use   • Smoking status: Never Smoker   • Smokeless tobacco: Never Used   Vaping Use   • Vaping Use: Never used   Substance and Sexual Activity   • Alcohol use: No   • Drug use: Never     Comment: Pt has track marks on right arm. Pt states \"It's from my INR being drawn.\"    • Sexual activity: Not Currently     Partners: Male     Birth control/protection: None           Objective   Physical Exam  Vitals and nursing note reviewed.   Constitutional:       General: She is not in acute distress.     Appearance: She is well-developed. She is not diaphoretic.   HENT:      Head: Normocephalic and atraumatic.      Right Ear: External ear normal.      Left Ear: External ear normal.      Nose: Nose normal.   Eyes:      Conjunctiva/sclera: Conjunctivae normal.      Pupils: Pupils are equal, round, and reactive to light.   Neck:      Vascular: No JVD.      Trachea: No tracheal deviation.   Cardiovascular:      Rate and Rhythm: Normal rate and regular rhythm.      Heart sounds: Normal heart sounds. No murmur heard.  Pulmonary:      Effort: Pulmonary effort is normal. No respiratory distress.      Breath sounds: Normal breath sounds. No wheezing.   Abdominal:      General: Bowel sounds are normal. There is no distension.      Palpations: Abdomen is soft.      Tenderness: There is no abdominal tenderness. There is left CVA tenderness. There is no right CVA tenderness.   Musculoskeletal:         General: No deformity. Normal range of motion.      Cervical back: Normal range of motion and neck supple.   Skin:     General: Skin is warm and dry.      Coloration: Skin is not pale.      Findings: No erythema or rash. "   Neurological:      Mental Status: She is alert and oriented to person, place, and time.      Cranial Nerves: No cranial nerve deficit.   Psychiatric:         Behavior: Behavior normal.         Thought Content: Thought content normal.         Procedures           ED Course  ED Course as of 07/14/22 1653   Thu Jul 14, 2022   1259 CT Abdomen Pelvis Stone Protocol [TK]   1509 Glucose(!): 345  Improving [TK]      ED Course User Index  [TK] Arjun Alvares PA-C                                           MDM  Number of Diagnoses or Management Options  UTI (urinary tract infection), uncomplicated: new and requires workup     Amount and/or Complexity of Data Reviewed  Clinical lab tests: reviewed and ordered  Tests in the radiology section of CPT®: reviewed and ordered    Risk of Complications, Morbidity, and/or Mortality  Presenting problems: moderate  Diagnostic procedures: moderate  Management options: moderate    Patient Progress  Patient progress: stable      Final diagnoses:   UTI (urinary tract infection), uncomplicated       ED Disposition  ED Disposition     ED Disposition   Discharge    Condition   Stable    Comment   --             Eddie Latif, APRN  [address] [facility] [address] [phone]    In 2 days           Medication List      New Prescriptions    cefdinir 300 MG capsule  Commonly known as: OMNICEF  Take 1 capsule by mouth 2 (Two) Times a Day.           Where to Get Your Medications      These medications were sent to [facility] - [place], [place] - [address] [place] [address] - [phone] [facility] [phone] [address] [address]    Phone: [phone]   · cefdinir 300 MG capsule          Arjun Alvares PA-C  07/14/22 1652

## 2022-10-10 ENCOUNTER — DOCUMENTATION (OUTPATIENT)
Dept: FAMILY MEDICINE CLINIC | Facility: CLINIC | Age: 57
End: 2022-10-10

## 2022-10-17 ENCOUNTER — OFFICE VISIT (OUTPATIENT)
Dept: FAMILY MEDICINE CLINIC | Facility: CLINIC | Age: 57
End: 2022-10-17
Payer: COMMERCIAL

## 2022-10-17 VITALS
HEART RATE: 84 BPM | WEIGHT: 235 LBS | HEIGHT: 66 IN | SYSTOLIC BLOOD PRESSURE: 126 MMHG | BODY MASS INDEX: 37.77 KG/M2 | DIASTOLIC BLOOD PRESSURE: 88 MMHG

## 2022-10-17 DIAGNOSIS — I27.82 OTHER CHRONIC PULMONARY EMBOLISM WITHOUT ACUTE COR PULMONALE (HCC): Primary | ICD-10-CM

## 2022-10-17 DIAGNOSIS — D68.51 FACTOR V LEIDEN (HCC): ICD-10-CM

## 2022-10-17 DIAGNOSIS — M79.661 PAIN IN RIGHT LOWER LEG: ICD-10-CM

## 2022-10-17 DIAGNOSIS — E66.09 CLASS 1 OBESITY DUE TO EXCESS CALORIES WITH SERIOUS COMORBIDITY AND BODY MASS INDEX (BMI) OF 34.0 TO 34.9 IN ADULT: ICD-10-CM

## 2022-10-17 DIAGNOSIS — D17.1 LIPOMA OF TORSO: ICD-10-CM

## 2022-10-17 PROCEDURE — 99214 OFFICE O/P EST MOD 30 MIN: CPT | Performed by: NURSE PRACTITIONER

## 2022-10-17 RX ORDER — NAPROXEN SODIUM 220 MG
440 TABLET ORAL 2 TIMES DAILY WITH MEALS
COMMUNITY
Start: 2022-10-17

## 2022-10-17 RX ORDER — METHOCARBAMOL 500 MG/1
500 TABLET, FILM COATED ORAL
Qty: 30 TABLET | Refills: 0 | Status: SHIPPED | OUTPATIENT
Start: 2022-10-17

## 2022-10-17 NOTE — PROGRESS NOTES
Name: Betty Brito      : 1965      MRN: [id number]  Encounter Provider: EZEQUIEL Hoff  Encounter Date: 10/17/2022   Encounter department: [place] [address]     1  Other chronic pulmonary embolism without acute cor pulmonale ([facility] )  Assessment & Plan:  Patient also reports she is no longer on xarelto per hematology consult       2  Factor V Leiden [facility])  Assessment & Plan:  Patient doesn't need to be on lifelong anticoagulation       3  Pain in right lower leg  Assessment & Plan:  Trial robaxin  Refer to sports  She has right achilles reconstruction     Orders:  -     methocarbamol (ROBAXIN) 500 mg tablet; Take 1 tablet (500 mg total) by mouth daily at bedtime as needed for muscle spasms  -     Ambulatory Referral to Sports Medicine; Future  -     naproxen sodium (ALEVE) 220 MG tablet; Take 2 tablets (440 mg total) by mouth 2 (two) times a day with meals    4  Class 1 obesity due to excess calories with serious comorbidity and body mass index (BMI) of 34 0 to 34 9 in adult  Assessment & Plan:  Routine health labs checked     Orders:  -     Lipid panel  -     Comprehensive metabolic panel; Future  -     TSH, 3rd generation with Free T4 reflex; Future    5  Lipoma of torso  Assessment & Plan:  Benign presentation  Advised to monitor              Subjective      Patient reports she is in a better mental health place  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   She is off wellbutrin and has been doing well  She wants to work on other things now that I am back from maternity leave  She is here today  She was seen by hematology and gynecology  She is on mirena and not having any periods  Has mirena inserted in February  She is working on loosing weight  She states she usually had painful legs with period  She did physical therapy and has been keeping up with it at home  Her right leg worse than others     She is worried about osteoporosis due to family history  She states her right leg is most painful with getting up having trouble with getting up after sitting for a long time  She has pain between her knee and her foot  Denies any swelling  She has history of achilles reconstruction and states her muscles feel too short for her leg  She has been taking 3 tylenol arthritis every morning  She has been also taking glucosamine MS BID which both did help in the beginning  She does yoga once a week at work  Review of Systems   Constitutional: Negative  Respiratory: Negative  Cardiovascular: Negative  Musculoskeletal: Positive for myalgias  Psychiatric/Behavioral: Negative  Current Outpatient Medications on File Prior to Visit   Medication Sig   • ferrous sulfate 325 (65 Fe) mg tablet Take 600 mg by mouth daily with breakfast   • levonorgestrel (MIRENA) 20 MCG/24HR IUD 1 each by Intrauterine route once   • Multiple Vitamin (MULTIVITAMIN) capsule Take 1 capsule by mouth daily       Objective     /88   Pulse 84   Ht 5' 6" (1 676 m)   Wt 107 kg (235 lb)   BMI 37 93 kg/m²     Physical Exam  Vitals and nursing note reviewed  Constitutional:       Appearance: Normal appearance  She is well-developed  HENT:      Head: Normocephalic and atraumatic  Eyes:      Extraocular Movements: Extraocular movements intact  Conjunctiva/sclera: Conjunctivae normal    Cardiovascular:      Rate and Rhythm: Normal rate and regular rhythm  Heart sounds: Normal heart sounds  Pulmonary:      Effort: Pulmonary effort is normal       Breath sounds: Normal breath sounds  Musculoskeletal:      Right lower leg: Tenderness (lower leg with flexion and extension of the ankle ) present  Right ankle: No swelling  No tenderness  Decreased range of motion  Skin:     Comments: Right flank lipoma    Neurological:      Mental Status: She is alert and oriented to person, place, and time     Psychiatric:         Mood and Affect: Mood normal  Behavior: Behavior normal        EZEQUIEL Ellison

## 2022-10-21 PROBLEM — D17.1 LIPOMA OF TORSO: Status: ACTIVE | Noted: 2022-10-21

## 2022-10-24 ENCOUNTER — OFFICE VISIT (OUTPATIENT)
Dept: OBGYN CLINIC | Facility: CLINIC | Age: 57
End: 2022-10-24
Payer: COMMERCIAL

## 2022-10-24 VITALS
SYSTOLIC BLOOD PRESSURE: 122 MMHG | HEIGHT: 66 IN | WEIGHT: 234 LBS | DIASTOLIC BLOOD PRESSURE: 84 MMHG | BODY MASS INDEX: 37.61 KG/M2

## 2022-10-24 DIAGNOSIS — M76.61 TENDONITIS, ACHILLES, RIGHT: Primary | ICD-10-CM

## 2022-10-24 PROCEDURE — 99203 OFFICE O/P NEW LOW 30 MIN: CPT | Performed by: PHYSICIAN ASSISTANT

## 2022-10-24 RX ORDER — MELOXICAM 15 MG/1
15 TABLET ORAL DAILY
Qty: 30 TABLET | Refills: 0 | Status: SHIPPED | OUTPATIENT
Start: 2022-10-24

## 2022-10-24 RX ORDER — TRAMADOL HYDROCHLORIDE 50 MG/1
50 TABLET ORAL EVERY 8 HOURS PRN
Qty: 10 TABLET | Refills: 0 | Status: SHIPPED | OUTPATIENT
Start: 2022-10-24

## 2022-10-24 NOTE — PROGRESS NOTES
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  1  Referral to physical therapy  2  Prescription for meloxicam     3  Patient requested pain medication because she is going away this coming weekend  One time prescription of Tramadol provided  4  Heel Cups provided  5  Follow up in 6 weeks with Primary care sports medicine  Chief Complaint     Right lower extremity pain    History of the Present Illness     Peter Talbot is a 49 y o  female who reports to the office today for evaluation of her right lower extremity  She notes a history of chronic right ankle pain which has been ongoing for the past 1-2 years  She denies any recent injury or trauma  She reports a history of right Achilles tendon repair at age 14  She notes pain primarily at the insertion of the Achilles tendon which radiates proximally to the knee  Pain is worse with repetitive use such as driving and using the gas pedal as well as prolonged standing and walking  She notes weakness due to the pain  No instability  No numbness or tingling  She has been taking over-the-counter anti-inflammatories and Tylenol with relief  She did participate in physical therapy for long COVID which did include some lower extremity exercises however she did not do any specific physical therapy for the right ankle  Physical Exam     Ht 5' 6" (1 676 m)   Wt 106 kg (234 lb)   BMI 37 77 kg/m²     Right lower extremity:  No gross deformity  Skin intact  No erythema ecchymosis or swelling  There is tenderness to palpation about the Achilles insertion  No tenderness to palpation about the gastrocnemius  No palpable deformity about the Achilles tendon  No tenderness to palpation about the knee  Full knee range of motion without pain  Ankle range of motion is intact with discomfort noted at terminal plantar flexion and dorsiflexion    Negative anterior drawer test   Negative talar tilt test   Negative Perez test     Eyes: Anicteric sclerae  ENT: Trachea midline  Lungs: Normal respiratory effort  CV: Capillary refill is less than 2 seconds  Skin: Intact without erythema  Lymph: No palpable lymphadenopathy  Neuro: Sensation is grossly intact to light touch  Psych: Mood and affect are appropriate  Data Review     I have personally reviewed pertinent films in PACS, and my interpretation follows:    X-rays right ankle 10/24/22:  No acute osseous abnormalities  No fracture or dislocation  No significant degenerative changes  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  Past Medical History:   Diagnosis Date   • Abnormal Pap smear of cervix 05/2016 5/2016 LGSIL, + other HPV; 6/2016 Colpo JANA 1  12/2021 pap neg/ HPV neg  • Anemia    • DVT (deep venous thrombosis) (HCC)    • Factor V Leiden mutation (HCC)    • GERD (gastroesophageal reflux disease)    • Obesity, Class II, BMI 35-39 9    • PONV (postoperative nausea and vomiting)    • Pulmonary embolism (HCC)    • S/P gastric surgery     gastric sleeve    • Sleep apnea     wears c-pap, Last assessed: 3/7/14       Past Surgical History:   Procedure Laterality Date   • CHOLECYSTECTOMY     • COLONOSCOPY      complete, resolved: 1996   • EGD AND COLONOSCOPY N/A 3/8/2018    Procedure: EGD AND COLONOSCOPY;  Surgeon: Tyson Rascon MD;  Location: [facility];   Service: Gastroenterology   • GALLBLADDER SURGERY     • IVC FILTER INSERTION  04/01/2014    Radiologic Supervision: Felicitas Filter Placement in IVC   • MOUTH SURGERY     • STOMACH SURGERY  04/01/2014    sleeve, Gastric surgery for Morbid Obesity Laparoscopic Longitudinal Gastrectomy, per allscripts       Allergies   Allergen Reactions   • Pseudoephedrine Tachycardia   • Latex Rash       Current Outpatient Medications on File Prior to Visit   Medication Sig Dispense Refill   • ferrous sulfate 325 (65 Fe) mg tablet Take 600 mg by mouth daily with breakfast     • levonorgestrel (MIRENA) 20 MCG/24HR IUD 1 each by Intrauterine route once     • methocarbamol (ROBAXIN) 500 mg tablet Take 1 tablet (500 mg total) by mouth daily at bedtime as needed for muscle spasms 30 tablet 0   • Multiple Vitamin (MULTIVITAMIN) capsule Take 1 capsule by mouth daily     • naproxen sodium (ALEVE) 220 MG tablet Take 2 tablets (440 mg total) by mouth 2 (two) times a day with meals       No current facility-administered medications on file prior to visit  Social History     Tobacco Use   • Smoking status: Never Smoker   • Smokeless tobacco: Never Used   • Tobacco comment: current non-smoker, per allscripts   Vaping Use   • Vaping Use: Never used   Substance Use Topics   • Alcohol use: Yes     Comment: social   • Drug use: No       Family History   Problem Relation Age of Onset   • Osteoporosis Mother    • Diabetes Father    • Heart disease Father    • Arthritis Father    • Heart failure Father    • Hypertension Father    • Ovarian cancer Maternal Grandmother 80   • No Known Problems Maternal Grandfather    • No Known Problems Paternal Grandmother    • No Known Problems Paternal Grandfather    • No Known Problems Paternal Aunt    • No Known Problems Paternal Aunt    • No Known Problems Paternal Aunt    • No Known Problems Paternal Aunt    • Colon cancer Neg Hx    • Breast cancer Neg Hx    • Uterine cancer Neg Hx        Review of Systems     As stated in the HPI  All other systems reviewed and are negative

## 2022-11-01 ENCOUNTER — EVALUATION (OUTPATIENT)
Dept: PHYSICAL THERAPY | Facility: CLINIC | Age: 57
End: 2022-11-01

## 2022-11-01 DIAGNOSIS — M76.61 TENDONITIS, ACHILLES, RIGHT: Primary | ICD-10-CM

## 2022-11-01 NOTE — PROGRESS NOTES
PT Evaluation     Today's date: 2022  Patient name: Jannie Cortés  : 1965  MRN: [id number]  Referring provider: Bi Stoll*  Dx:   Encounter Diagnosis     ICD-10-CM    1  Tendonitis, Achilles, right  M76 61 Ambulatory Referral to Physical Therapy                  Assessment  Assessment details: Jannie Cortés is a 49 y o  female referred to outpatient physical therapy for R achilles pain  Impairments include pain, decreased R ankle ROM, decreased R ankle strength, TTP R gastroc, and decreased LE flexibility  These impairments are limiting patients functional ability to perform standing, ambulation,driving, and stair navigation  Pt is restricted in participating in ADLs  Assessment reveals (-) knowles test, (+) [facility] test, TTP to R achilles tendonopathy, and decreased gastroc flexibility indicating possible achilles tendonopathy  Pt would benefit from skilled physical therapy to address the limitations above to allow return to prior level of function  At this point in time, no further referral necessary based upon examination results  Impairments: abnormal coordination, abnormal gait, abnormal or restricted ROM, activity intolerance, impaired balance, impaired physical strength, lacks appropriate home exercise program, pain with function and weight-bearing intolerance  Understanding of Dx/Px/POC: good  Goals  Short term goals 2-3 weeks    1) Patient will demonstrate ability to perform HEP  2) Patient will decrease pain at worst to 7/10  3) Patient will improve R ankle DF to 5°  Long term goals 4-8 weeks    1) Patient will demonstrate independence with comprehensive HEP  2) Patient improve FOTO score to equal or greater than expected values  3) Patient will demonstrate ability to perform non-reciprocal stair navigation  4) Patient will demonstrate ability to driving without increase in symptoms         Plan  Plan details: Plan of care was discussed with patient at time of evaluation  Pt will be seen 1x a week for 8 weeks  Patient would benefit from: skilled physical therapy  Planned modality interventions: cryotherapy, TENS, thermotherapy: hydrocollator packs and low level laser therapy  Other planned modality interventions: PRN  Planned therapy interventions: balance, balance/weight bearing training, flexibility, functional ROM exercises, gait training, home exercise program, therapeutic exercise, therapeutic activities, stretching, strengthening, patient education, neuromuscular re-education, manual therapy and joint mobilization  Frequency: 1x week  Duration in weeks: 8  Treatment plan discussed with: patient        Subjective Evaluation    History of Present Illness  Mechanism of injury: Patient presents to outpatient physical therapy with chief complaint of R achilles pain  At 18 pts achilles was cut and reattached following a work accident  Roughly 6 months ago it started to bother her  Feels good right now due to taking a muscle relaxer and she feels flexible  However as the day goes on her muscle feels it shortens and makes it so hard to walk  States when she use to get infrequent pain when she would get her period, however now that she is going through menopause now the pain is constant  Pt was recently on vacation and her ankle was fine for Thursday and Friday and then Saturday she could barely walk  Patient Denies episodes of numbness or tingling in RLE  Pain described as burning and spasm like in ankle, toes and does radiate to her knee  Pain rating currently 0/10, at best 0/10 and at worst 10/10  Patient states limitations with ambulation, driving, sit to stand transitions from high chairs, standing, stair navigation  Pt states she now performs non-reciprocal stair navigation since her pain started  Pt just started a job at [employer] but does not feel limited with her occupational tasks  Aggravating factors include weight bearing  Patient states use of meloxicam and muscle relaxers for relief of symptoms  Pt goals for therapy include just to be able to walk, not have flare ups and get back to normal                Recurrent probem    Quality of life: good    Pain  Current pain ratin  At best pain ratin  At worst pain rating: 10  Quality: burning and sharp  Relieving factors: medications  Progression: no change    Social Support    Employment status: working    Diagnostic Tests    FCE comments: XRAY R ANKLE 10/24/22    IMPRESSION:     No acute osseous abnormality  Objective     Observations   Left Ankle/Foot   Negative for atrophy  Right Ankle/Foot   Positive for atrophy  Palpation     Right   Hypertonic in the lateral gastrocnemius, medial gastrocnemius and soleus  Tenderness of the lateral gastrocnemius, medial gastrocnemius and soleus  Tenderness     Right Ankle/Foot   Tenderness in the Achilles insertion and proximal Achilles  Active Range of Motion   Left Ankle/Foot   Dorsiflexion (ke): 10 degrees   Plantar flexion: 35 degrees   Inversion: 30 degrees   Eversion: 20 degrees     Right Ankle/Foot   Dorsiflexion (ke): 0 degrees   Plantar flexion: 45 degrees   Inversion: 15 degrees   Eversion: 5 degrees     Passive Range of Motion     Right Ankle/Foot    Dorsiflexion (ke): 0 degrees   Plantar flexion: 45 degrees   Inversion: 15 degrees   Eversion: 5 degrees     Joint Play     Right Ankle/Foot  Hypomobile in the talocrural joint and subtalar joint  Strength/Myotome Testing     Left Ankle/Foot   Dorsiflexion: 5  Plantar flexion: 4  Inversion: 5  Eversion: 5    Right Ankle/Foot   Dorsiflexion: 4+  Plantar flexion: 4-  Inversion: 4+  Eversion: 4+    Tests     Right Ankle/Foot   Negative for Perez       Additional Tests Details  (+) R [facility] test             Precautions: hx of R achilles surgery       Manuals             R achilles IASTM             R gastroc STM             R achilles low level laser direct contact                          Neuro Re-Ed                                                                                                        Ther Ex             Bike              Standing gastroc stretch HEP            Standing soleus stretch  HEP            Long sit gastroc stretch HEP            Seated gastroc stretch w/ strap HEP            Standing gastroc stretch w/ slant             Standing soleus stretch w/ slant             Eccentric heel raises              Ther Activity                                       Gait Training                                       Modalities

## 2022-11-08 ENCOUNTER — OFFICE VISIT (OUTPATIENT)
Dept: PHYSICAL THERAPY | Facility: CLINIC | Age: 57
End: 2022-11-08

## 2022-11-08 DIAGNOSIS — M76.61 TENDONITIS, ACHILLES, RIGHT: Primary | ICD-10-CM

## 2022-11-08 NOTE — PROGRESS NOTES
Daily Note     Today's date: 2022  Patient name: Justina Olivares  : 1965  MRN: [id number]  Referring provider: Darnell Jama*  Dx:   Encounter Diagnosis     ICD-10-CM    1  Tendonitis, Achilles, right  M76 61                   Subjective: Pt reports she feels the most symptoms still when she is sitting more at work  Pt states she feels the medication is helping her pain intensity  Objective: See treatment diary below  Provided ankle four ways with orange band as part of HEP  Assessment: Increased tenderness to medial and lateral aspects of achilles insertion  Significant tightness and tenderness to medial proximal gastroc during STM with increased tenderness/soreness post manuals  Demonstrated mild difficulty with eccentric control during ankle four ways  Educated pt on maintaining stretches pain free  Reviewed HEP and all questions answered at this time  Plan: Continue per plan of care  Precautions: hx of R achilles surgery       Manuals            R achilles IASTM  AG           R gastroc STM  AG           R achilles low level laser direct contact  15 W   AG                        Neuro Re-Ed                                                                                                        Ther Ex             Bike   5'           Standing gastroc stretch HEP            Standing soleus stretch  HEP            Long sit gastroc stretch HEP 3x30"           Seated gastroc stretch w/ strap HEP            Standing gastroc stretch w/ slant  3x30"            Standing soleus stretch w/ slant  3x30"            Eccentric heel raises              Ankle TB four ways   OTB x10 ea              Ther Activity                                       Gait Training                                       Modalities

## 2022-11-10 ENCOUNTER — OFFICE VISIT (OUTPATIENT)
Dept: FAMILY MEDICINE CLINIC | Facility: CLINIC | Age: 57
End: 2022-11-10

## 2022-11-10 VITALS
DIASTOLIC BLOOD PRESSURE: 80 MMHG | HEART RATE: 83 BPM | RESPIRATION RATE: 18 BRPM | SYSTOLIC BLOOD PRESSURE: 130 MMHG | BODY MASS INDEX: 36.96 KG/M2 | WEIGHT: 230 LBS | HEIGHT: 66 IN | OXYGEN SATURATION: 99 % | TEMPERATURE: 100 F

## 2022-11-10 DIAGNOSIS — Z12.39 ENCOUNTER FOR OTHER SCREENING FOR MALIGNANT NEOPLASM OF BREAST: ICD-10-CM

## 2022-11-10 DIAGNOSIS — J06.9 UPPER RESPIRATORY TRACT INFECTION, UNSPECIFIED TYPE: Primary | ICD-10-CM

## 2022-11-10 RX ORDER — AZITHROMYCIN 250 MG/1
TABLET, FILM COATED ORAL
Qty: 6 TABLET | Refills: 0 | Status: SHIPPED | OUTPATIENT
Start: 2022-11-10 | End: 2022-11-15

## 2022-11-10 RX ORDER — METHYLPREDNISOLONE 4 MG/1
TABLET ORAL
Qty: 21 EACH | Refills: 0 | Status: SHIPPED | OUTPATIENT
Start: 2022-11-10

## 2022-11-10 NOTE — PROGRESS NOTES
Name: Eufemia Syed      : 1965      MRN: [id number]  Encounter Provider: EZEQUIEL Barrera  Encounter Date: 11/10/2022   Encounter department: [place] [address]     1  Upper respiratory tract infection, unspecified type  Comments:  will give antibiotics due to high fever  check covid/flu  medrol due to cough  Orders:  -     azithromycin (Zithromax) 250 mg tablet; Take 2 tablets (500 mg total) by mouth daily for 1 day, THEN 1 tablet (250 mg total) daily for 4 days  -     methylPREDNISolone 4 MG tablet therapy pack; Use as directed on package    2  Encounter for other screening for malignant neoplasm of breast  -     Mammo screening bilateral w 3d & cad; Future; Expected date: 11/10/2022    BMI Counseling: Body mass index is 37 12 kg/m²  The BMI is above normal  Nutrition recommendations include decreasing portion sizes, moderation in carbohydrate intake, reducing intake of saturated and trans fat and reducing intake of cholesterol  Exercise recommendations include moderate physical activity 150 minutes/week and strength training exercises  Rationale for BMI follow-up plan is due to patient being overweight or obese  Subjective      Patient started on  with ear pain and fever  102  She has been taking tylenol  Her  was seen also recently at urgent care for bronchitis   was tested for covid  Review of Systems   Constitutional: Positive for appetite change, chills, diaphoresis, fatigue and fever  HENT: Positive for congestion, ear discharge (yellow), ear pain, postnasal drip, rhinorrhea, sinus pressure, sneezing, sore throat (started today ) and voice change  Eyes: Positive for pain  Respiratory: Positive for cough (productive yellow/brown )  Gastrointestinal: Negative for abdominal pain, diarrhea, nausea and vomiting  Musculoskeletal: Positive for myalgias  Neurological: Negative for headaches         Current Outpatient Medications on File Prior to Visit   Medication Sig   • ferrous sulfate 325 (65 Fe) mg tablet Take 600 mg by mouth daily with breakfast   • levonorgestrel (MIRENA) 20 MCG/24HR IUD 1 each by Intrauterine route once   • meloxicam (Mobic) 15 mg tablet Take 1 tablet (15 mg total) by mouth daily   • methocarbamol (ROBAXIN) 500 mg tablet Take 1 tablet (500 mg total) by mouth daily at bedtime as needed for muscle spasms   • Multiple Vitamin (MULTIVITAMIN) capsule Take 1 capsule by mouth daily   • naproxen sodium (ALEVE) 220 MG tablet Take 2 tablets (440 mg total) by mouth 2 (two) times a day with meals   • traMADol (Ultram) 50 mg tablet Take 1 tablet (50 mg total) by mouth every 8 (eight) hours as needed for moderate pain       Objective     /80 (BP Location: Left arm, Patient Position: Sitting, Cuff Size: Standard)   Pulse 83   Temp 100 °F (37 8 °C) (Tympanic)   Resp 18   Ht 5' 6" (1 676 m)   Wt 104 kg (230 lb)   SpO2 99%   BMI 37 12 kg/m²     Physical Exam  Constitutional:       General: She is not in acute distress  Appearance: She is obese  She is ill-appearing (mild- moderately ill )  She is not toxic-appearing or diaphoretic  HENT:      Head: Normocephalic and atraumatic  Right Ear: Tympanic membrane normal       Left Ear: Tympanic membrane normal       Nose: Congestion and rhinorrhea present  Rhinorrhea is clear and purulent  Left Turbinates: Enlarged  Mouth/Throat:      Mouth: Mucous membranes are moist       Pharynx: No posterior oropharyngeal erythema  Eyes:      Extraocular Movements: Extraocular movements intact  Conjunctiva/sclera: Conjunctivae normal    Cardiovascular:      Rate and Rhythm: Normal rate and regular rhythm  Heart sounds: Normal heart sounds, S1 normal and S2 normal    Pulmonary:      Effort: Pulmonary effort is normal       Breath sounds: Normal breath sounds  Lymphadenopathy:      Cervical: No cervical adenopathy     Neurological: Mental Status: She is oriented to person, place, and time     Psychiatric:         Mood and Affect: Mood normal          Behavior: Behavior normal        EZEQUIEL Leyva

## 2022-11-11 ENCOUNTER — TELEPHONE (OUTPATIENT)
Dept: FAMILY MEDICINE CLINIC | Facility: CLINIC | Age: 57
End: 2022-11-11

## 2022-11-11 LAB
FLUAV RNA RESP QL NAA+PROBE: NEGATIVE
FLUBV RNA RESP QL NAA+PROBE: NEGATIVE
SARS-COV-2 RNA RESP QL NAA+PROBE: NEGATIVE

## 2022-11-11 NOTE — TELEPHONE ENCOUNTER
----- Message from [address] [place] [address] sent at 11/11/2022 11:26 AM EST -----  Covid and flu negative

## 2022-11-15 ENCOUNTER — OFFICE VISIT (OUTPATIENT)
Dept: PHYSICAL THERAPY | Facility: CLINIC | Age: 57
End: 2022-11-15

## 2022-11-15 DIAGNOSIS — M76.61 TENDONITIS, ACHILLES, RIGHT: Primary | ICD-10-CM

## 2022-11-15 NOTE — PROGRESS NOTES
Daily Note     Today's date: 11/15/2022  Patient name: Magdalena Claros  : 1965  MRN: [id number]  Referring provider: Anthony Ocampo*  Dx:   Encounter Diagnosis     ICD-10-CM    1  Tendonitis, Achilles, right  M76 61                   Subjective: Pt reports she has noticed some improvement and has had less muscle spasms overall  States she was also sick last week and was given steroids as medication and is not sure if that could be contributing to her also feeling better  Objective: See treatment diary below  Assessment: Decreased tenderness present to achilles insertion and gastroc musculature; tenderness remains in medial aspect of gastroc at posterior tibialis  Introduced self STM to posterior tib and gastroc as part of HEP with good understanding  Cues provided to improve eccentric control in all planes during ankle strengthening  Plan: Continue per plan of care  Precautions: hx of R achilles surgery       Manuals 11/1 11/8 11/15          R achilles IASTM  AG AG          R gastroc STM  AG AG          R achilles low level laser direct contact  15 W   AG 15 W   AG                       Neuro Re-Ed                                                                                                        Ther Ex             Bike   5'           Standing gastroc stretch HEP            Standing soleus stretch  HEP            Long sit gastroc stretch HEP 3x30" 3x30"          Seated gastroc stretch w/ strap HEP            Standing gastroc stretch w/ slant  3x30"  3x30"           Standing soleus stretch w/ slant  3x30"  3x30"          Eccentric heel raises              Ankle TB four ways   OTB x10 ea  OTB 3x10 ea             Self gastroc STM w/ LAX ball   3' HEP          Ther Activity                                       Gait Training                                       Modalities

## 2022-11-22 ENCOUNTER — OFFICE VISIT (OUTPATIENT)
Dept: PHYSICAL THERAPY | Facility: CLINIC | Age: 57
End: 2022-11-22

## 2022-11-22 DIAGNOSIS — M76.61 TENDONITIS, ACHILLES, RIGHT: Primary | ICD-10-CM

## 2022-11-22 NOTE — PROGRESS NOTES
Daily Note     Today's date: 2022  Patient name: Steve Carreno  : 1965  MRN: [id number]  Referring provider: Irma Anton*  Dx:   Encounter Diagnosis     ICD-10-CM    1  Tendonitis, Achilles, right  M76 61                      Subjective: Pt reports she has been hurting due to getting out decorations and doing the stairs more but her pain intensity is overall less than before she started PT  Objective: See treatment diary below  Progressed to pink TB for ankle four ways  Assessment: Increased tenderness and tightness to posterior tib and medial gastroc during STM  Educated pt on use of ball to perform self soft tissue mobilization as part of HEP to address impairments  Continues to demonstrate medial achilles tenderness during IASTM however improvements in symptoms post session  Good tolerance to progression of TB intensity for ankle four ways without increase in symptoms  Plan: Continue per plan of care  Precautions: hx of R achilles surgery       Manuals 11/1 11/8 11/15 11/22         R achilles IASTM  AG AG AG         R gastroc STM  AG AG AG         R achilles low level laser direct contact  15 W   AG 15 W   AG 15 W   AG                      Neuro Re-Ed                                                                                                        Ther Ex             Bike   5'           Standing gastroc stretch HEP            Standing soleus stretch  HEP            Long sit gastroc stretch HEP 3x30" 3x30" 3x30"          Seated gastroc stretch w/ strap HEP            Standing gastroc stretch w/ slant  3x30"  3x30"  3x30"          Standing soleus stretch w/ slant  3x30"  3x30" 3x30"          Eccentric heel raises              Ankle TB four ways   OTB x10 ea  OTB 3x10 ea  Pink 2x10 ea            Self gastroc STM w/ LAX ball   3' HEP 3'          Ther Activity                                       Gait Training                                       Modalities

## 2022-11-29 ENCOUNTER — OFFICE VISIT (OUTPATIENT)
Dept: PHYSICAL THERAPY | Facility: CLINIC | Age: 57
End: 2022-11-29

## 2022-11-29 DIAGNOSIS — M76.61 TENDONITIS, ACHILLES, RIGHT: Primary | ICD-10-CM

## 2022-11-29 NOTE — PROGRESS NOTES
Daily Note     Today's date: 2022  Patient name: Jorgito Balderrama  : 1965  MRN: [id number]  Referring provider: Iglesia Ayon*  Dx:   Encounter Diagnosis     ICD-10-CM    1  Tendonitis, Achilles, right  M76 61                      Subjective: Pt reports she did not take the muscle relaxer last week and did well with no cramps at night  States this week she is going to try to stop taking the meloxicam due to it bothering her stomach  Objective: See treatment diary below  Assessment: Continued tenderness through posterior tib during manual therapy; trialed theragun with significant improvement in tenderness post manuals  Medial tenderness remains in achilles during IASTM with good erythema response  Progressed TB level with good tolerance  Educated pt to continue performing HEP  Pt is making good progress with skilled PT  Plan: Continue per plan of care  Precautions: hx of R achilles surgery       Manuals 11/1 11/8 11/15 11/22 11/29        R achilles IASTM  AG AG AG AG        R gastroc STM  AG AG AG AG and theragun        R achilles low level laser direct contact  15 W   AG 15 W   AG 15 W   AG 15 W  AG                     Neuro Re-Ed                                                                                                        Ther Ex             Bike   5'   5'        Standing gastroc stretch HEP            Standing soleus stretch  HEP            Long sit gastroc stretch HEP 3x30" 3x30" 3x30"          Seated gastroc stretch w/ strap HEP            Standing gastroc stretch w/ slant  3x30"  3x30"  3x30"  3x30"        Standing soleus stretch w/ slant  3x30"  3x30" 3x30"  3x30"        Eccentric heel raises              Ankle TB four ways   OTB x10 ea  OTB 3x10 ea  Pink 2x10 ea  GTB 2x10 ea           Self gastroc STM w/ LAX ball   3' HEP 3'  HEP        Ther Activity                                       Gait Training                                       Modalities

## 2022-12-06 ENCOUNTER — OFFICE VISIT (OUTPATIENT)
Dept: PHYSICAL THERAPY | Facility: CLINIC | Age: 57
End: 2022-12-06

## 2022-12-06 DIAGNOSIS — M76.61 TENDONITIS, ACHILLES, RIGHT: Primary | ICD-10-CM

## 2022-12-06 NOTE — PROGRESS NOTES
Daily Note     Today's date: 2022  Patient name: Neal Sever  : 1965  MRN: [id number]  Referring provider: Anastasiia Barrientos*  Dx:   Encounter Diagnosis     ICD-10-CM    1  Tendonitis, Achilles, right  M76 61                      Subjective: Pt reports she never got relief from last session and has been a lot of pain  Increased difficulty with walking and her symptoms feel like they are traveling up the side of her leg and she is very stiff  Pt also feels maybe the theragun caused increased cramping in her calve  Objective: See treatment diary below  Assessment: Held on theragun intervention due to possible long term exacerbation of pt symptoms  Significant tightness and tenderness to R peroneals during assessment therefore increased focus on manuals to peroneals  Decreased tenderness post manuals however no change in symptoms with ambulation at end of session  Introduced long sitting peroneal stretching with good tolerance  No symptoms present in distal achilles with IASTM  Educated pt she can begin treadmill walking on flat level surface  Plan: Continue per plan of care        Precautions: hx of R achilles surgery       Manuals 11/1 11/8 11/15 11/22 11/29 12/6       R achilles IASTM  AG AG AG AG AG       R gastroc STM  AG AG AG AG and theragun AG peroneal       R achilles low level laser direct contact  15 W   AG 15 W   AG 15 W   AG 15 W  AG NV                    Neuro Re-Ed                                                                                                        Ther Ex             Bike   5'   5' 5'       Standing gastroc stretch HEP            Standing soleus stretch  HEP            Long sit gastroc stretch HEP 3x30" 3x30" 3x30"   3x30"       Seated gastroc stretch w/ strap HEP            Standing gastroc stretch w/ slant  3x30"  3x30"  3x30"  3x30" 3x30"        Standing soleus stretch w/ slant  3x30"  3x30" 3x30"  3x30" 3x30"        Eccentric heel raises Ankle TB four ways   OTB x10 ea  OTB 3x10 ea  Pink 2x10 ea  GTB 2x10 ea           Self gastroc STM w/ LAX ball   3' HEP 3'  HEP        Long sit peroneal stretch       3x30"       Ther Activity                                       Gait Training                                       Modalities

## 2022-12-12 ENCOUNTER — TELEPHONE (OUTPATIENT)
Dept: HEMATOLOGY ONCOLOGY | Facility: CLINIC | Age: 57
End: 2022-12-12

## 2022-12-12 NOTE — TELEPHONE ENCOUNTER
Appointment Cancellation Or Reschedule     Person calling in Patient    If other than patient calling, are they listed on the communication consent form? Provider Mynor Akers   Office Visit Date and Time  12/13/22   Office Visit Location [facility]   Did patient want to reschedule their office appointment? If so, when was it scheduled to? 1/10/2023   Did you have STAR scheduled for this appointment? no   Do you need STAR set up for your new appointment? If yes, please send to "PATIENT RIDESHARE" pool for STAR rescheduling no   If you are cancelling appointment, can we notify STAR to cancel ride? If yes, please send to "PATIENT RIDESHARE" pool for STAR to cancel service ni   Is this patient calling to reschedule an infusion appointment? no   When is their next infusion appointment? no   Is this patient a Chemo patient? no   Reason for Cancellation or Reschedule Patient is unable to get out of work for appointment     If the patient is a treatment patient, please route this to the office nurse  If the patient is not on treatment, please route to the office MA  If the patient is a surgical oncology patient, please route to surg/onc clinical pool

## 2022-12-15 ENCOUNTER — APPOINTMENT (OUTPATIENT)
Dept: PHYSICAL THERAPY | Facility: CLINIC | Age: 57
End: 2022-12-15

## 2022-12-16 ENCOUNTER — APPOINTMENT (OUTPATIENT)
Dept: PHYSICAL THERAPY | Facility: CLINIC | Age: 57
End: 2022-12-16

## 2022-12-20 ENCOUNTER — OFFICE VISIT (OUTPATIENT)
Dept: PHYSICAL THERAPY | Facility: CLINIC | Age: 57
End: 2022-12-20

## 2022-12-20 DIAGNOSIS — M76.61 TENDONITIS, ACHILLES, RIGHT: Primary | ICD-10-CM

## 2022-12-20 NOTE — PROGRESS NOTES
PT Re-Evaluation  and PT Discharge    Today's date: 2022  Patient name: William Tijerina  : 1965  MRN: [id number]  Referring provider: Ching Baez*  Dx:   Encounter Diagnosis     ICD-10-CM    1  Tendonitis, Achilles, right  M76 61                      Assessment  Assessment details: William Tijerina is a 49 y o  female referred to outpatient physical therapy for R achilles pain  Pt reports 80% improvement which correlates to improved FOTO outcomes  William Tijerina has been compliant with attending PT and home exercise program since initial eval  Estefania has made improvements in objective data and achieved desired functional goals  Patient reports having returned to their prior level or function  It was mutually agreed to Discharge to home exercise program at this time  Patient has good understanding of provided home exercise program and was instructed to call with any questions or if issues should arise  Goals  Short term goals 2-3 weeks    1) Patient will demonstrate ability to perform HEP  (MET)  2) Patient will decrease pain at worst to 7/10  (MET)  3) Patient will improve R ankle DF to 5°  (MET)      Long term goals 4-8 weeks    1) Patient will demonstrate independence with comprehensive HEP  (MET)  2) Patient improve FOTO score to equal or greater than expected values  (MET)  3) Patient will demonstrate ability to perform reciprocal stair navigation  (MET)  4) Patient will demonstrate ability to driving without increase in symptoms  (MET)      Plan  Plan details: Discharged  Treatment plan discussed with: patient        Subjective Evaluation    History of Present Illness  Mechanism of injury: Patient presents to outpatient physical therapy with chief complaint of R achilles pain  At 18 pts achilles was cut and reattached following a work accident  Roughly 6 months ago it started to bother her  Feels good right now due to taking a muscle relaxer and she feels flexible   However as the day goes on her muscle feels it shortens and makes it so hard to walk  States when she use to get infrequent pain when she would get her period, however now that she is going through menopause now the pain is constant  Pt was recently on vacation and her ankle was fine for Thursday and Friday and then Saturday she could barely walk  Patient Denies episodes of numbness or tingling in RLE  Pain described as burning and spasm like in ankle, toes and does radiate to her knee  Pain rating currently 0/10, at best 0/10 and at worst 10/10  Patient states limitations with ambulation, driving, sit to stand transitions from high chairs, standing, stair navigation  Pt states she now performs non-reciprocal stair navigation since her pain started  Pt just started a job at [employer] but does not feel limited with her occupational tasks  Aggravating factors include weight bearing  Patient states use of meloxicam and muscle relaxers for relief of symptoms  Pt goals for therapy include just to be able to walk, not have flare ups and get back to normal      22: Pt reports she is overall much better  States driving, standing, and ambulation is much improved with limited symptoms  Does not increased pain on the inner aspect of her R calf that can be bothersome especially when she is sitting at work but no reports of pain that caused her to come to therapy  Recurrent probem    Quality of life: good    Pain  Current pain ratin  At best pain ratin  At worst pain ratin  Quality: burning and sharp  Relieving factors: medications  Progression: no change    Social Support    Employment status: working    Diagnostic Tests    [facility]CE comments: XRAY R ANKLE 10/24/22    IMPRESSION:     No acute osseous abnormality  Objective     Observations   Left Ankle/Foot   Negative for atrophy  Right Ankle/Foot   Positive for atrophy       Palpation     Right   Hypertonic in the lateral gastrocnemius, medial gastrocnemius and soleus  Tenderness of the lateral gastrocnemius, medial gastrocnemius, posterior tibialis and soleus  Tenderness     Right Ankle/Foot   No tenderness in the Achilles insertion and proximal Achilles  Active Range of Motion   Left Ankle/Foot   Dorsiflexion (ke): 10 degrees   Plantar flexion: 35 degrees   Inversion: 30 degrees   Eversion: 20 degrees     Right Ankle/Foot   Dorsiflexion (ke): 10 degrees   Plantar flexion: 45 degrees   Inversion: 25 degrees   Eversion: 10 degrees     Passive Range of Motion     Right Ankle/Foot    Dorsiflexion (ke): 10 degrees   Plantar flexion: 45 degrees   Inversion: 25 degrees   Eversion: 10 degrees     Joint Play     Right Ankle/Foot  Hypomobile in the talocrural joint and subtalar joint  Strength/Myotome Testing     Left Ankle/Foot   Dorsiflexion: 5  Plantar flexion: 4  Inversion: 5  Eversion: 5    Right Ankle/Foot   Dorsiflexion: 5  Plantar flexion: 4+  Inversion: 5  Eversion: 5    Tests     Right Ankle/Foot   Negative for Perez       Additional Tests Details  (+) R [facility] test 12/20/22: (-) [facility] test              Precautions: hx of R achilles surgery       Manuals 11/1 11/8 11/15 11/22 11/29 12/6 12/20      R achilles IASTM  AG AG AG AG AG       R gastroc STM  AG AG AG AG and theragun AG peroneal AG post tib      R achilles low level laser direct contact  15 W   AG 15 W   AG 15 W   AG 15 W  AG NV       Re-eval       AG      Neuro Re-Ed                                                                                                        Ther Ex             Bike   5'   5' 5' 5'      Standing gastroc stretch HEP            Standing soleus stretch  HEP            Long sit gastroc stretch HEP 3x30" 3x30" 3x30"   3x30"       Seated gastroc stretch w/ strap HEP            Standing gastroc stretch w/ slant  3x30"  3x30"  3x30"  3x30" 3x30"        Standing soleus stretch w/ slant  3x30"  3x30" 3x30"  3x30" 3x30" Eccentric heel raises              Ankle TB four ways   OTB x10 ea  OTB 3x10 ea  Pink 2x10 ea  GTB 2x10 ea           Self gastroc STM w/ LAX ball   3' HEP 3'  HEP        Long sit peroneal stretch       3x30"       Pt education HEP review       AG      Ther Activity                                       Gait Training                                       Modalities

## 2022-12-26 DIAGNOSIS — M76.61 TENDONITIS, ACHILLES, RIGHT: ICD-10-CM

## 2022-12-28 ENCOUNTER — TELEPHONE (OUTPATIENT)
Dept: OBGYN CLINIC | Facility: HOSPITAL | Age: 57
End: 2022-12-28

## 2022-12-28 NOTE — TELEPHONE ENCOUNTER
Pt contacted Call Center requested refill of their medication  Medication Name: Mobic      Dosage of Med: 15 mg  Frequency of Med: 1 tablet daily      Remaining Medication: 3      Pharmacy and Location: [place] [address]  Preferred Callback Phone Number: [phone]      Thank you  PLEASE ADVISE PATIENTS:    REFILL REQUESTS WILL BE PROCESSED WITHIN 24-48 HOURS

## 2022-12-29 NOTE — TELEPHONE ENCOUNTER
Caller: juan      Doctor:alfonso    Reason for call: calling again for refill of meloxicam patient states she will be going out of town and needs this refilled as soon as possible advise patient of the 24-48hr process   Expressed understanding     Call back#:n/a

## 2022-12-30 RX ORDER — MELOXICAM 15 MG/1
15 TABLET ORAL DAILY
Qty: 30 TABLET | Refills: 0 | Status: SHIPPED | OUTPATIENT
Start: 2022-12-30

## 2023-01-10 ENCOUNTER — OFFICE VISIT (OUTPATIENT)
Dept: HEMATOLOGY ONCOLOGY | Facility: CLINIC | Age: 58
End: 2023-01-10

## 2023-01-10 ENCOUNTER — APPOINTMENT (OUTPATIENT)
Dept: LAB | Facility: MEDICAL CENTER | Age: 58
End: 2023-01-10

## 2023-01-10 VITALS
SYSTOLIC BLOOD PRESSURE: 110 MMHG | HEART RATE: 83 BPM | DIASTOLIC BLOOD PRESSURE: 70 MMHG | BODY MASS INDEX: 38.09 KG/M2 | OXYGEN SATURATION: 97 % | WEIGHT: 237 LBS | RESPIRATION RATE: 16 BRPM | TEMPERATURE: 98.1 F | HEIGHT: 66 IN

## 2023-01-10 DIAGNOSIS — Z90.3 STATUS POST GASTRECTOMY: ICD-10-CM

## 2023-01-10 DIAGNOSIS — D68.51 FACTOR V LEIDEN (HCC): ICD-10-CM

## 2023-01-10 DIAGNOSIS — R53.83 OTHER FATIGUE: Primary | ICD-10-CM

## 2023-01-10 DIAGNOSIS — Z90.3 POSTGASTRECTOMY MALABSORPTION: ICD-10-CM

## 2023-01-10 DIAGNOSIS — R53.83 OTHER FATIGUE: ICD-10-CM

## 2023-01-10 DIAGNOSIS — K91.2 POSTGASTRECTOMY MALABSORPTION: ICD-10-CM

## 2023-01-10 LAB
25(OH)D3 SERPL-MCNC: 17.4 NG/ML (ref 30–100)
FERRITIN SERPL-MCNC: 9 NG/ML (ref 8–388)
IRON SATN MFR SERPL: 10 % (ref 15–50)
IRON SERPL-MCNC: 39 UG/DL (ref 50–170)
TIBC SERPL-MCNC: 390 UG/DL (ref 250–450)
VIT B12 SERPL-MCNC: 263 PG/ML (ref 100–900)

## 2023-01-10 NOTE — PROGRESS NOTES
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        She has only had had 1 provoked event  Even with history of factor 5 the recommendation is someone her age to remain off of anticoagulation in to be aware of signs and symptoms of DVT and PE  She decided to come off blood thinner and has been off for about 10 months  She did have Mirena placed  We reviewed this is lower risk due to being in uterus and hormone more localized to uterus but there is still slight risk  She is aware of DVT/PE symptoms and signs and to seek evaluation with any  She also is aware to discuss factor V with family members as it is inherited  2  Postgastrectomy malabsorption3  Other fatigue, 4  Status post gastrectomy  Requests labs due to symptoms  Call to review if she does not hear from us  Follow up in 1 year  - CBC and differential; Future  - Iron Panel (Includes Ferritin, Iron Sat%, Iron, and TIBC); Future  - Vitamin B12; Future  - Vitamin D 25 hydroxy; Future      HPI:  60-year-old female presents for follow-up regarding history of thrombosis  She was seen in consult in        12 years ago she was placed on OCP  She within 2 weeks had a PE and DVT  She had workup which showed heterozygous factor 5  She states her mother is also heterozygous  Her father does not carry it  Her son has been tested and he is not a carrier      Due to this she was maintained on Coumadin for all of this time  She presently has a copper IUD which she states is getting removed next week due to the time of placement she is due for change of IUD      8 years ago she had gastric sleeve bariatric surgery  No clots after surgery       She had no clots during pregnancy  She did have multiple miscarriages prior to her term pregnancy however      Interval history:    ROS: Review of Systems   Constitutional: Positive for fatigue  Respiratory: Negative for cough and shortness of breath  Gastrointestinal: Negative for abdominal pain, constipation, diarrhea, nausea and vomiting  Genitourinary: Negative for difficulty urinating, dysuria, hematuria and menstrual problem  Musculoskeletal:        Right achilles pain with muscle soreness x months, seeing ortho, went to PT as well; takes muscle relaxer with benefit     Generalized muscle cramps   Skin: Positive for pallor  Neurological: Negative for dizziness, light-headedness and headaches  Hematological: Negative  Psychiatric/Behavioral: Negative  Past Medical History:   Diagnosis Date   • Abnormal Pap smear of cervix 05/2016 5/2016 LGSIL, + other HPV; 6/2016 Colpo JANA 1  12/2021 pap neg/ HPV neg  • Anemia    • DVT (deep venous thrombosis) (HCC)    • Factor V Leiden mutation (HCC)    • GERD (gastroesophageal reflux disease)    • Obesity, Class II, BMI 35-39 9    • PONV (postoperative nausea and vomiting)    • Pulmonary embolism (HCC)    • S/P gastric surgery     gastric sleeve    • Sleep apnea     wears c-pap, Last assessed: 3/7/14       Past Surgical History:   Procedure Laterality Date   • CHOLECYSTECTOMY     • COLONOSCOPY      complete, resolved: 1996   • EGD AND COLONOSCOPY N/A 3/8/2018    Procedure: EGD AND COLONOSCOPY;  Surgeon: Kacy Chaidez MD;  Location: [facility] GI [facility];   Service: Gastroenterology   • GALLBLADDER SURGERY     • IVC FILTER INSERTION  04/01/2014    Radiologic Supervision: Felicitas Filter Placement in IVC   • MOUTH SURGERY     • STOMACH SURGERY  04/01/2014    sleeve, Gastric surgery for Morbid Obesity Laparoscopic Longitudinal Gastrectomy, per allscripts       Social History     Socioeconomic History   • Marital status: /Civil Union     Spouse name: None   • Number of children: 1   • Years of education: None   • Highest education level: None   Occupational History   • None   Tobacco Use   • Smoking status: Never   • Smokeless tobacco: Never   • Tobacco comments:     current non-smoker, per allscripts   Vaping Use   • Vaping Use: Never used   Substance and Sexual Activity   • Alcohol use: Yes     Comment: social   • Drug use: No   • Sexual activity: Yes     Partners: Male     Birth control/protection: I U D     Other Topics Concern   • None   Social History Narrative    , per allscripts    Exercise frequency (times/week)     Social Determinants of Health     Financial Resource Strain: Not on file   Food Insecurity: Not on file   Transportation Needs: Not on file   Physical Activity: Not on file   Stress: Not on file   Social Connections: Not on file   Intimate Partner Violence: Not on file   Housing Stability: Not on file       Family History   Problem Relation Age of Onset   • Osteoporosis Mother    • Diabetes Father    • Heart disease Father    • Arthritis Father    • Heart failure Father    • Hypertension Father    • Ovarian cancer Maternal Grandmother 80   • No Known Problems Maternal Grandfather    • No Known Problems Paternal Grandmother    • No Known Problems Paternal Grandfather    • No Known Problems Paternal Aunt    • No Known Problems Paternal Aunt    • No Known Problems Paternal Aunt    • No Known Problems Paternal Aunt    • Colon cancer Neg Hx    • Breast cancer Neg Hx    • Uterine cancer Neg Hx        Allergies   Allergen Reactions   • Pseudoephedrine Tachycardia   • Latex Rash         Current Outpatient Medications:   •  ferrous sulfate 325 (65 Fe) mg tablet, Take 325 mg by mouth daily with breakfast, Disp: , Rfl:   • levonorgestrel (MIRENA) 20 MCG/24HR IUD, 1 each by Intrauterine route once, Disp: , Rfl:   •  meloxicam (MOBIC) 15 mg tablet, TAKE 1 TABLET (15 MG TOTAL) BY MOUTH DAILY  , Disp: 30 tablet, Rfl: 0  •  methocarbamol (ROBAXIN) 500 mg tablet, TAKE 1 TABLET (500 MG TOTAL) BY MOUTH DAILY AT BEDTIME AS NEEDED FOR MUSCLE SPASMS, Disp: 30 tablet, Rfl: 5  •  Multiple Vitamin (MULTIVITAMIN) capsule, Take 1 capsule by mouth daily, Disp: , Rfl:   •  naproxen sodium (ALEVE) 220 MG tablet, Take 2 tablets (440 mg total) by mouth 2 (two) times a day with meals (Patient taking differently: Take 440 mg by mouth in the morning), Disp: , Rfl:   •  traMADol (Ultram) 50 mg tablet, Take 1 tablet (50 mg total) by mouth every 8 (eight) hours as needed for moderate pain (Patient not taking: Reported on 1/10/2023), Disp: 10 tablet, Rfl: 0      Physical Exam:  /70 (BP Location: Left arm, Patient Position: Sitting, Cuff Size: Large)   Pulse 83   Temp 98 1 °F (36 7 °C) (Temporal)   Resp 16   Ht 5' 6" (1 676 m)   Wt 108 kg (237 lb)   SpO2 97%   BMI 38 25 kg/m²     Physical Exam  Vitals reviewed  Constitutional:       General: She is not in acute distress  Appearance: She is well-developed  HENT:      Head: Normocephalic and atraumatic  Eyes:      General: No scleral icterus  Conjunctiva/sclera: Conjunctivae normal    Cardiovascular:      Rate and Rhythm: Normal rate and regular rhythm  Heart sounds: Normal heart sounds  No murmur heard  Pulmonary:      Effort: Pulmonary effort is normal  No respiratory distress  Breath sounds: Normal breath sounds  Abdominal:      Palpations: Abdomen is soft  Tenderness: There is no abdominal tenderness  Musculoskeletal:         General: No tenderness  Normal range of motion  Cervical back: Normal range of motion and neck supple  Right lower leg: No edema  Left lower leg: No edema  Lymphadenopathy:      Cervical: No cervical adenopathy     Skin: General: Skin is warm and dry  Neurological:      Mental Status: She is alert and oriented to person, place, and time  Cranial Nerves: No cranial nerve deficit  Psychiatric:         Mood and Affect: Mood normal          Behavior: Behavior normal        Labs:  Lab Results   Component Value Date    WBC 4 4 11/16/2021    HGB 11 7 11/16/2021    HCT 37 2 11/16/2021    MCV 77 0 (L) 11/16/2021     11/16/2021     Lab Results   Component Value Date     04/03/2014    K 4 2 11/16/2021     11/16/2021    CO2 24 11/16/2021    ANIONGAP 2 (L) 04/03/2014    BUN 13 11/16/2021    CREATININE 0 70 11/16/2021    GLUCOSE 104 04/03/2014    CALCIUM 9 1 11/16/2021    AST 13 11/16/2021    ALT 13 11/16/2021    ALKPHOS 80 11/16/2021    EGFR 86 02/11/2020       Patient voiced understanding and agreement in the above discussion  Aware to contact our office with questions/symptoms in the interim  This note has been generated by voice recognition software system  Therefore, there may be spelling, grammar, and or syntax errors  Please contact if questions arise

## 2023-01-17 ENCOUNTER — TELEPHONE (OUTPATIENT)
Dept: HEMATOLOGY ONCOLOGY | Facility: CLINIC | Age: 58
End: 2023-01-17

## 2023-01-23 DIAGNOSIS — D50.8 ACQUIRED IRON DEFICIENCY ANEMIA DUE TO DECREASED ABSORPTION: ICD-10-CM

## 2023-01-23 DIAGNOSIS — Z90.3 POSTGASTRECTOMY MALABSORPTION: Primary | ICD-10-CM

## 2023-01-23 DIAGNOSIS — K91.2 POSTGASTRECTOMY MALABSORPTION: Primary | ICD-10-CM

## 2023-01-23 DIAGNOSIS — E53.8 B12 DEFICIENCY: ICD-10-CM

## 2023-01-23 RX ORDER — SODIUM CHLORIDE 9 MG/ML
20 INJECTION, SOLUTION INTRAVENOUS ONCE
Status: CANCELLED | OUTPATIENT
Start: 2023-01-27

## 2023-01-23 RX ORDER — CYANOCOBALAMIN 1000 UG/ML
1000 INJECTION, SOLUTION INTRAMUSCULAR; SUBCUTANEOUS ONCE
Status: CANCELLED | OUTPATIENT
Start: 2023-01-27 | End: 2023-01-27

## 2023-01-31 ENCOUNTER — TELEMEDICINE (OUTPATIENT)
Dept: FAMILY MEDICINE CLINIC | Facility: CLINIC | Age: 58
End: 2023-01-31

## 2023-01-31 VITALS — WEIGHT: 225 LBS | HEIGHT: 66 IN | BODY MASS INDEX: 36.16 KG/M2

## 2023-01-31 DIAGNOSIS — J06.9 UPPER RESPIRATORY TRACT INFECTION, UNSPECIFIED TYPE: Primary | ICD-10-CM

## 2023-01-31 RX ORDER — AZITHROMYCIN 250 MG/1
TABLET, FILM COATED ORAL
Qty: 6 TABLET | Refills: 0 | Status: SHIPPED | OUTPATIENT
Start: 2023-01-31 | End: 2023-02-05

## 2023-01-31 RX ORDER — ONDANSETRON 4 MG/1
4 TABLET, FILM COATED ORAL EVERY 8 HOURS PRN
Qty: 20 TABLET | Refills: 0 | Status: SHIPPED | OUTPATIENT
Start: 2023-01-31

## 2023-01-31 NOTE — LETTER
January 31, 2023     Patient: Ray Olsen  YOB: 1965  Date of Visit: 1/31/2023      To Whom it May Concern:    Les Kitchen is under my professional care  Estefania was seen in my office on 1/31/2023  Estefania may return to work on 1/4/2023  If you have any questions or concerns, please don't hesitate to call           Sincerely,          EZEQUIEL Perez        CC: No Recipients

## 2023-01-31 NOTE — PROGRESS NOTES
Virtual Regular Visit    Verification of patient location:    Patient is located in the following state in which I hold an active license [place]      Assessment/Plan:    Problem List Items Addressed This Visit    None  Visit Diagnoses     Upper respiratory tract infection, unspecified type    -  Primary    Relevant Medications    azithromycin (ZITHROMAX) 250 mg tablet    ondansetron (ZOFRAN) 4 mg tablet               Reason for visit is   Chief Complaint   Patient presents with   • URI     Sx started 3 days ago  Diarrhea, vomiting Cough ,sore throat, nasal congestion  • Virtual Regular Visit        Encounter provider EZEQUIEL Bridges    Provider located at [address]  [address] [facility] [address] [place] [id number] [phone]      Recent Visits  No visits were found meeting these conditions  Showing recent visits within past 7 days and meeting all other requirements  Today's Visits  Date Type Provider Dept   01/31/23 Telemedicine EZEQUIEL Rodriges [facility]   Showing today's visits and meeting all other requirements  Future Appointments  No visits were found meeting these conditions  Showing future appointments within next 150 days and meeting all other requirements       The patient was identified by name and date of birth  Darcie Cho was informed that this is a telemedicine visit and that the visit is being conducted through the [employer]  She agrees to proceed     My office door was closed  No one else was in the room  She acknowledged consent and understanding of privacy and security of the video platform  The patient has agreed to participate and understands they can discontinue the visit at any time  Patient is aware this is a billable service  Subjective  Darcie Cho is a 56 y o  female    Patient started with URI symptoms 3 days ago     Started with vomiting and diarrhea  Sipping on tea and ginger-ceci   She now has burning in nose, ear pressure  Has covid exposure last week last contact was 1/26   She took a covid test yesterday it was negative  Past Medical History:   Diagnosis Date   • Abnormal Pap smear of cervix 05/2016 5/2016 LGSIL, + other HPV; 6/2016 Colpo JANA 1  12/2021 pap neg/ HPV neg  • Anemia    • DVT (deep venous thrombosis) ([facility])    • Factor V Leiden mutation (HCC)    • GERD (gastroesophageal reflux disease)    • Obesity, Class II, BMI 35-39 9    • PONV (postoperative nausea and vomiting)    • Pulmonary embolism (HCC)    • S/P gastric surgery     gastric sleeve    • Sleep apnea     wears c-pap, Last assessed: 3/7/14       Past Surgical History:   Procedure Laterality Date   • CHOLECYSTECTOMY     • COLONOSCOPY      complete, resolved: 1996   • EGD AND COLONOSCOPY N/A 3/8/2018    Procedure: EGD AND COLONOSCOPY;  Surgeon: Jackie Rasmussen MD;  Location: [facility] GI [facility]; Service: Gastroenterology   • GALLBLADDER SURGERY     • IVC FILTER INSERTION  04/01/2014    Radiologic Supervision: Felicitas Filter Placement in IVC   • MOUTH SURGERY     • STOMACH SURGERY  04/01/2014    sleeve, Gastric surgery for Morbid Obesity Laparoscopic Longitudinal Gastrectomy, per allscripts       Current Outpatient Medications   Medication Sig Dispense Refill   • azithromycin (ZITHROMAX) 250 mg tablet Take 2 tablets today then 1 tablet daily x 4 days 6 tablet 0   • ferrous sulfate 325 (65 Fe) mg tablet Take 325 mg by mouth daily with breakfast     • levonorgestrel (MIRENA) 20 MCG/24HR IUD 1 each by Intrauterine route once     • meloxicam (MOBIC) 15 mg tablet TAKE 1 TABLET (15 MG TOTAL) BY MOUTH DAILY   30 tablet 0   • methocarbamol (ROBAXIN) 500 mg tablet TAKE 1 TABLET (500 MG TOTAL) BY MOUTH DAILY AT BEDTIME AS NEEDED FOR MUSCLE SPASMS 30 tablet 5   • Multiple Vitamin (MULTIVITAMIN) capsule Take 1 capsule by mouth daily     • naproxen sodium (ALEVE) 220 MG tablet Take 2 tablets (440 mg total) by mouth 2 (two) times a day with meals (Patient taking differently: Take 440 mg by mouth in the morning)     • ondansetron (ZOFRAN) 4 mg tablet Take 1 tablet (4 mg total) by mouth every 8 (eight) hours as needed for nausea or vomiting 20 tablet 0   • traMADol (Ultram) 50 mg tablet Take 1 tablet (50 mg total) by mouth every 8 (eight) hours as needed for moderate pain (Patient not taking: Reported on 1/31/2023) 10 tablet 0     No current facility-administered medications for this visit  Allergies   Allergen Reactions   • Pseudoephedrine Tachycardia   • Latex Rash       Review of Systems   Constitutional: Positive for fatigue  HENT: Positive for congestion and sneezing  Respiratory: Positive for cough  Gastrointestinal: Positive for diarrhea and vomiting  Video Exam    Vitals:    01/31/23 0857   Weight: 102 kg (225 lb)   Height: 5' 6" (1 676 m)       Physical Exam  Constitutional:       General: She is not in acute distress  Appearance: She is well-developed  She is ill-appearing  She is not diaphoretic  HENT:      Head: Normocephalic and atraumatic  Right Ear: External ear normal       Left Ear: External ear normal       Nose: Congestion present  Eyes:      Conjunctiva/sclera: Conjunctivae normal    Pulmonary:      Effort: Pulmonary effort is normal  No respiratory distress  Neurological:      Mental Status: She is alert and oriented to person, place, and time  Psychiatric:         Mood and Affect: Mood normal          Behavior: Behavior normal          Thought Content:  Thought content normal           I spent 9 minutes directly with the patient during this visit

## 2023-01-31 NOTE — LETTER
February 1, 2023     Patient: De Bonilla  YOB: 1965  Date of Visit: 1/31/2023 January 31, 2023     Patient: De Bonilla  YOB: 1965  Date of Visit: 1/31/2023      To Whom it May Concern:    Darryl Cardona is under my professional care  Estefania was seen in my office on 1/31/2023  Estefania may return to work on 2/4/2023  If you have any questions or concerns, please don't hesitate to call           Sincerely,          Claudette Shield

## 2023-02-10 ENCOUNTER — HOSPITAL ENCOUNTER (OUTPATIENT)
Dept: INFUSION CENTER | Facility: CLINIC | Age: 58
Discharge: HOME | End: 2023-02-10

## 2023-02-10 DIAGNOSIS — Z90.3 POSTGASTRECTOMY MALABSORPTION: ICD-10-CM

## 2023-02-10 DIAGNOSIS — D50.8 ACQUIRED IRON DEFICIENCY ANEMIA DUE TO DECREASED ABSORPTION: ICD-10-CM

## 2023-02-10 DIAGNOSIS — E53.8 B12 DEFICIENCY: Primary | ICD-10-CM

## 2023-02-10 DIAGNOSIS — K91.2 POSTGASTRECTOMY MALABSORPTION: ICD-10-CM

## 2023-02-10 RX ORDER — SODIUM CHLORIDE 9 MG/ML
20 INJECTION, SOLUTION INTRAVENOUS ONCE
Status: CANCELLED | OUTPATIENT
Start: 2023-02-17

## 2023-02-10 RX ORDER — CYANOCOBALAMIN 1000 UG/ML
1000 INJECTION, SOLUTION INTRAMUSCULAR; SUBCUTANEOUS ONCE
Status: COMPLETED | OUTPATIENT
Start: 2023-02-10 | End: 2023-02-10

## 2023-02-10 RX ORDER — CYANOCOBALAMIN 1000 UG/ML
1000 INJECTION, SOLUTION INTRAMUSCULAR; SUBCUTANEOUS ONCE
Status: CANCELLED | OUTPATIENT
Start: 2023-02-17 | End: 2023-02-17

## 2023-02-10 RX ORDER — SODIUM CHLORIDE 9 MG/ML
20 INJECTION, SOLUTION INTRAVENOUS ONCE
Status: COMPLETED | OUTPATIENT
Start: 2023-02-10 | End: 2023-02-10

## 2023-02-10 RX ADMIN — SODIUM CHLORIDE 200 MG: 9 INJECTION, SOLUTION INTRAVENOUS at 08:42

## 2023-02-10 RX ADMIN — CYANOCOBALAMIN 1000 MCG: 1000 INJECTION, SOLUTION INTRAMUSCULAR at 10:01

## 2023-02-10 RX ADMIN — SODIUM CHLORIDE 20 ML/HR: 0.9 INJECTION, SOLUTION INTRAVENOUS at 08:41

## 2023-02-10 NOTE — PROGRESS NOTES
Patient arrived on unit for first Venofer and B12  Oriented and answered any questions prior to initiating therapy  Tolerated Venofer infusion without incident  At the end, while flushing with saline, area around IV  became swelled  Good blood return observed prior to D/C angiocath  Cold compress applied  Patient remained a short time to observe  Patient stated it felt better  Instructed patient to apply compresses, monitor area, and notify physician if any issues  Verbalized understanding  Tolerated Vitamin B12 injection in [place]  Aware of next appointment   Declined AVS

## 2023-02-17 ENCOUNTER — HOSPITAL ENCOUNTER (OUTPATIENT)
Dept: INFUSION CENTER | Facility: CLINIC | Age: 58
End: 2023-02-17

## 2023-02-17 VITALS — DIASTOLIC BLOOD PRESSURE: 84 MMHG | TEMPERATURE: 97.7 F | RESPIRATION RATE: 16 BRPM | SYSTOLIC BLOOD PRESSURE: 140 MMHG

## 2023-02-17 DIAGNOSIS — Z90.3 POSTGASTRECTOMY MALABSORPTION: ICD-10-CM

## 2023-02-17 DIAGNOSIS — E53.8 B12 DEFICIENCY: Primary | ICD-10-CM

## 2023-02-17 DIAGNOSIS — D50.8 ACQUIRED IRON DEFICIENCY ANEMIA DUE TO DECREASED ABSORPTION: ICD-10-CM

## 2023-02-17 DIAGNOSIS — K91.2 POSTGASTRECTOMY MALABSORPTION: ICD-10-CM

## 2023-02-17 RX ORDER — CYANOCOBALAMIN 1000 UG/ML
1000 INJECTION, SOLUTION INTRAMUSCULAR; SUBCUTANEOUS ONCE
Status: COMPLETED | OUTPATIENT
Start: 2023-02-17 | End: 2023-02-17

## 2023-02-17 RX ORDER — SODIUM CHLORIDE 9 MG/ML
20 INJECTION, SOLUTION INTRAVENOUS ONCE
Status: CANCELLED | OUTPATIENT
Start: 2023-02-24

## 2023-02-17 RX ORDER — SODIUM CHLORIDE 9 MG/ML
20 INJECTION, SOLUTION INTRAVENOUS ONCE
Status: COMPLETED | OUTPATIENT
Start: 2023-02-17 | End: 2023-02-17

## 2023-02-17 RX ORDER — CYANOCOBALAMIN 1000 UG/ML
1000 INJECTION, SOLUTION INTRAMUSCULAR; SUBCUTANEOUS ONCE
Status: CANCELLED | OUTPATIENT
Start: 2023-02-24 | End: 2023-02-24

## 2023-02-17 RX ADMIN — CYANOCOBALAMIN 1000 MCG: 1000 INJECTION, SOLUTION INTRAMUSCULAR at 08:59

## 2023-02-17 RX ADMIN — SODIUM CHLORIDE 200 MG: 9 INJECTION, SOLUTION INTRAVENOUS at 08:59

## 2023-02-17 RX ADMIN — SODIUM CHLORIDE 20 ML/HR: 0.9 INJECTION, SOLUTION INTRAVENOUS at 08:40

## 2023-02-17 NOTE — PROGRESS NOTES
Pt  Tolerated Venofer and Vitamin B12 without adverse event  Vitamin B12 given IM in Right Deltoid  Future appointments reviewed  AVS declined

## 2023-02-24 ENCOUNTER — HOSPITAL ENCOUNTER (OUTPATIENT)
Dept: INFUSION CENTER | Facility: CLINIC | Age: 58
End: 2023-02-24

## 2023-02-24 VITALS
DIASTOLIC BLOOD PRESSURE: 86 MMHG | RESPIRATION RATE: 18 BRPM | TEMPERATURE: 97.2 F | SYSTOLIC BLOOD PRESSURE: 136 MMHG | HEART RATE: 71 BPM

## 2023-02-24 DIAGNOSIS — Z90.3 POSTGASTRECTOMY MALABSORPTION: ICD-10-CM

## 2023-02-24 DIAGNOSIS — D50.8 ACQUIRED IRON DEFICIENCY ANEMIA DUE TO DECREASED ABSORPTION: ICD-10-CM

## 2023-02-24 DIAGNOSIS — K91.2 POSTGASTRECTOMY MALABSORPTION: ICD-10-CM

## 2023-02-24 DIAGNOSIS — E53.8 B12 DEFICIENCY: Primary | ICD-10-CM

## 2023-02-24 RX ORDER — SODIUM CHLORIDE 9 MG/ML
20 INJECTION, SOLUTION INTRAVENOUS ONCE
Status: CANCELLED | OUTPATIENT
Start: 2023-03-03

## 2023-02-24 RX ORDER — CYANOCOBALAMIN 1000 UG/ML
1000 INJECTION, SOLUTION INTRAMUSCULAR; SUBCUTANEOUS ONCE
Status: CANCELLED | OUTPATIENT
Start: 2023-03-03 | End: 2023-03-03

## 2023-02-24 RX ORDER — CYANOCOBALAMIN 1000 UG/ML
1000 INJECTION, SOLUTION INTRAMUSCULAR; SUBCUTANEOUS ONCE
Status: COMPLETED | OUTPATIENT
Start: 2023-02-24 | End: 2023-02-24

## 2023-02-24 RX ORDER — SODIUM CHLORIDE 9 MG/ML
20 INJECTION, SOLUTION INTRAVENOUS ONCE
Status: COMPLETED | OUTPATIENT
Start: 2023-02-24 | End: 2023-02-24

## 2023-02-24 RX ADMIN — SODIUM CHLORIDE 20 ML/HR: 0.9 INJECTION, SOLUTION INTRAVENOUS at 08:30

## 2023-02-24 RX ADMIN — CYANOCOBALAMIN 1000 MCG: 1000 INJECTION, SOLUTION INTRAMUSCULAR at 08:27

## 2023-02-24 RX ADMIN — IRON SUCROSE 200 MG: 20 INJECTION, SOLUTION INTRAVENOUS at 08:33

## 2023-03-03 ENCOUNTER — HOSPITAL ENCOUNTER (OUTPATIENT)
Dept: INFUSION CENTER | Facility: CLINIC | Age: 58
End: 2023-03-03

## 2023-03-03 VITALS
SYSTOLIC BLOOD PRESSURE: 131 MMHG | RESPIRATION RATE: 18 BRPM | HEART RATE: 71 BPM | DIASTOLIC BLOOD PRESSURE: 81 MMHG | TEMPERATURE: 97.3 F

## 2023-03-03 DIAGNOSIS — D50.8 ACQUIRED IRON DEFICIENCY ANEMIA DUE TO DECREASED ABSORPTION: ICD-10-CM

## 2023-03-03 DIAGNOSIS — K91.2 POSTGASTRECTOMY MALABSORPTION: ICD-10-CM

## 2023-03-03 DIAGNOSIS — E53.8 B12 DEFICIENCY: Primary | ICD-10-CM

## 2023-03-03 DIAGNOSIS — Z90.3 POSTGASTRECTOMY MALABSORPTION: ICD-10-CM

## 2023-03-03 RX ORDER — SODIUM CHLORIDE 9 MG/ML
20 INJECTION, SOLUTION INTRAVENOUS ONCE
Status: COMPLETED | OUTPATIENT
Start: 2023-03-03 | End: 2023-03-03

## 2023-03-03 RX ORDER — CYANOCOBALAMIN 1000 UG/ML
1000 INJECTION, SOLUTION INTRAMUSCULAR; SUBCUTANEOUS ONCE
Status: CANCELLED | OUTPATIENT
Start: 2023-03-10 | End: 2023-03-10

## 2023-03-03 RX ORDER — SODIUM CHLORIDE 9 MG/ML
20 INJECTION, SOLUTION INTRAVENOUS ONCE
Status: CANCELLED | OUTPATIENT
Start: 2023-03-10

## 2023-03-03 RX ORDER — CYANOCOBALAMIN 1000 UG/ML
1000 INJECTION, SOLUTION INTRAMUSCULAR; SUBCUTANEOUS ONCE
Status: COMPLETED | OUTPATIENT
Start: 2023-03-03 | End: 2023-03-03

## 2023-03-03 RX ADMIN — SODIUM CHLORIDE 200 MG: 9 INJECTION, SOLUTION INTRAVENOUS at 08:19

## 2023-03-03 RX ADMIN — CYANOCOBALAMIN 1000 MCG: 1000 INJECTION, SOLUTION INTRAMUSCULAR at 08:16

## 2023-03-03 RX ADMIN — SODIUM CHLORIDE 20 ML/HR: 0.9 INJECTION, SOLUTION INTRAVENOUS at 08:14

## 2023-03-10 ENCOUNTER — HOSPITAL ENCOUNTER (OUTPATIENT)
Dept: INFUSION CENTER | Facility: CLINIC | Age: 58
End: 2023-03-10

## 2023-03-10 VITALS
TEMPERATURE: 97.1 F | RESPIRATION RATE: 18 BRPM | DIASTOLIC BLOOD PRESSURE: 80 MMHG | SYSTOLIC BLOOD PRESSURE: 145 MMHG | HEART RATE: 82 BPM

## 2023-03-10 DIAGNOSIS — Z90.3 POSTGASTRECTOMY MALABSORPTION: ICD-10-CM

## 2023-03-10 DIAGNOSIS — K91.2 POSTGASTRECTOMY MALABSORPTION: ICD-10-CM

## 2023-03-10 DIAGNOSIS — E53.8 B12 DEFICIENCY: Primary | ICD-10-CM

## 2023-03-10 DIAGNOSIS — D50.8 ACQUIRED IRON DEFICIENCY ANEMIA DUE TO DECREASED ABSORPTION: ICD-10-CM

## 2023-03-10 RX ORDER — SODIUM CHLORIDE 9 MG/ML
20 INJECTION, SOLUTION INTRAVENOUS ONCE
Status: CANCELLED | OUTPATIENT
Start: 2023-03-17

## 2023-03-10 RX ORDER — SODIUM CHLORIDE 9 MG/ML
20 INJECTION, SOLUTION INTRAVENOUS ONCE
Status: COMPLETED | OUTPATIENT
Start: 2023-03-10 | End: 2023-03-10

## 2023-03-10 RX ADMIN — SODIUM CHLORIDE 20 ML/HR: 0.9 INJECTION, SOLUTION INTRAVENOUS at 08:41

## 2023-03-10 RX ADMIN — IRON SUCROSE 200 MG: 20 INJECTION, SOLUTION INTRAVENOUS at 08:42

## 2023-03-10 NOTE — PROGRESS NOTES
Patient arrived to the unit and denied complications with previous infusions  Tolerated venofer infusion well without adverse affects  Patient has completed her B12 injections  Aware of future appointments

## 2023-03-10 NOTE — PLAN OF CARE
Problem: INFECTION - ADULT  Goal: Absence or prevention of progression during hospitalization  Description: INTERVENTIONS:  - Assess and monitor for signs and symptoms of infection  - Monitor lab/diagnostic results  - Monitor all insertion sites, i e  indwelling lines, tubes, and drains  - Monitor endotracheal if appropriate and nasal secretions for changes in amount and color  - [place] appropriate cooling/warming therapies per order  - Administer medications as ordered  - Instruct and encourage patient and family to use good hand hygiene technique  - Identify and instruct in appropriate isolation precautions for identified infection/condition  Outcome: Progressing

## 2023-03-16 ENCOUNTER — TELEPHONE (OUTPATIENT)
Dept: HEMATOLOGY ONCOLOGY | Facility: CLINIC | Age: 58
End: 2023-03-16

## 2023-03-16 NOTE — TELEPHONE ENCOUNTER
Spoke with patient  She reported she noticed some side effects since starting venofer  Bloating, Dry skin, excessive thirst and swelling in both arms are not typical side effects from Venofer  Patient states she does have a headache that has progressively gotten worse since starting infusions, metallic taste in her mouth and elevation in her blood pressure  Patient states her blood pressure usually "runs low" and has now gone up from her baseline  Explained that Dr Edwin Sagastume recommended she reach out to her PCP regarding ongoing higher than normal BP  Patient is scheduled for her last venofer tomorrow - explained to patient that she can hold on getting the infusion if she wishes  We can recheck labs sooner to see how her numbers are  Patient states she still wants to go for her infusion tomorrow despite side effects  I reviewed that recommendation in January was that she have 5 infusions and repeat labs in 6 months    Patient was agreeable and verbalized understanding    Explained again that she can hold on last infusion if she wishes and re check labs in 4 weeks

## 2023-03-17 ENCOUNTER — HOSPITAL ENCOUNTER (OUTPATIENT)
Dept: INFUSION CENTER | Facility: CLINIC | Age: 58
End: 2023-03-17

## 2023-03-17 VITALS
RESPIRATION RATE: 18 BRPM | DIASTOLIC BLOOD PRESSURE: 79 MMHG | SYSTOLIC BLOOD PRESSURE: 142 MMHG | TEMPERATURE: 96.7 F | HEART RATE: 87 BPM

## 2023-03-17 DIAGNOSIS — E53.8 B12 DEFICIENCY: Primary | ICD-10-CM

## 2023-03-17 DIAGNOSIS — Z90.3 POSTGASTRECTOMY MALABSORPTION: ICD-10-CM

## 2023-03-17 DIAGNOSIS — K91.2 POSTGASTRECTOMY MALABSORPTION: ICD-10-CM

## 2023-03-17 DIAGNOSIS — D50.8 ACQUIRED IRON DEFICIENCY ANEMIA DUE TO DECREASED ABSORPTION: ICD-10-CM

## 2023-03-17 RX ORDER — SODIUM CHLORIDE 9 MG/ML
20 INJECTION, SOLUTION INTRAVENOUS ONCE
Status: COMPLETED | OUTPATIENT
Start: 2023-03-17 | End: 2023-03-17

## 2023-03-17 RX ORDER — SODIUM CHLORIDE 9 MG/ML
20 INJECTION, SOLUTION INTRAVENOUS ONCE
Status: CANCELLED | OUTPATIENT
Start: 2023-03-24

## 2023-03-17 RX ADMIN — SODIUM CHLORIDE 200 MG: 9 INJECTION, SOLUTION INTRAVENOUS at 08:46

## 2023-03-17 RX ADMIN — SODIUM CHLORIDE 20 ML/HR: 0.9 INJECTION, SOLUTION INTRAVENOUS at 08:45

## 2023-03-17 NOTE — PLAN OF CARE
Problem: INFECTION - ADULT  Goal: Absence or prevention of progression during hospitalization  Description: INTERVENTIONS:  - Assess and monitor for signs and symptoms of infection  - Monitor lab/diagnostic results  - Monitor all insertion sites, i e  indwelling lines, tubes, and drains  - Monitor endotracheal if appropriate and nasal secretions for changes in amount and color  - [place] appropriate cooling/warming therapies per order  - Administer medications as ordered  - Instruct and encourage patient and family to use good hand hygiene technique  - Identify and instruct in appropriate isolation precautions for identified infection/condition  Outcome: Progressing

## 2023-07-25 ENCOUNTER — OFFICE VISIT (OUTPATIENT)
Dept: BARIATRICS | Facility: CLINIC | Age: 58
End: 2023-07-25
Payer: COMMERCIAL

## 2023-07-25 VITALS
HEART RATE: 91 BPM | HEIGHT: 66 IN | DIASTOLIC BLOOD PRESSURE: 80 MMHG | TEMPERATURE: 97.6 F | SYSTOLIC BLOOD PRESSURE: 126 MMHG | WEIGHT: 235 LBS | BODY MASS INDEX: 37.77 KG/M2

## 2023-07-25 DIAGNOSIS — K91.2 POSTSURGICAL MALABSORPTION: ICD-10-CM

## 2023-07-25 DIAGNOSIS — E66.9 OBESITY, CLASS II, BMI 35-39.9: ICD-10-CM

## 2023-07-25 DIAGNOSIS — Z12.11 ENCOUNTER FOR SCREENING COLONOSCOPY: ICD-10-CM

## 2023-07-25 DIAGNOSIS — D68.51 FACTOR V LEIDEN (HCC): ICD-10-CM

## 2023-07-25 DIAGNOSIS — Z86.711 HISTORY OF PULMONARY EMBOLUS (PE): ICD-10-CM

## 2023-07-25 DIAGNOSIS — Z98.84 BARIATRIC SURGERY STATUS: ICD-10-CM

## 2023-07-25 DIAGNOSIS — Z48.815 ENCOUNTER FOR SURGICAL AFTERCARE FOLLOWING SURGERY OF DIGESTIVE SYSTEM: Primary | ICD-10-CM

## 2023-07-25 PROCEDURE — 99203 OFFICE O/P NEW LOW 30 MIN: CPT | Performed by: PHYSICIAN ASSISTANT

## 2023-07-25 NOTE — PROGRESS NOTES
Assessment/Plan:     Patient ID: Enedelia Nieves is a 56 y.o. female. Bariatric Surgery Status    -s/p Vertical Sleeve Gastrectomy with Dr. Loaiza Standing on 4/1/2014. Presents to the office today for OD annual. She has been lost to follow since 2016. Overall fairly well but with weight regain. Initial weight was 289 lbs with SHAWNA of 170 and she was able to maintain her weight for a long time. Around covid time patient reports she started going into menopause and this is when she noticed started to gradually gain weight and had a hard time losing weight. Overall, she still feels restriction with meals however despite this is still struggling. She states her diet is generally low carb, low sugar. She eats mainly proteins and veggies. Exercise includes yoga, treadmill and walking her dog. Overall tries to exercise at least 3 times a week. She would like to be around 170-180 lbs. Patient would like to know if she qualifies for a revision. She is due for screening EGD. She reports only intermittent episodes of reflux which is mainly dependant on the type of food she eats. She is also due for colonoscopy, so would prefer to get both EGD and colonoscopy done together. Will refer to GI and she will call our office to make an appt after to review her EGD with Dr Fadia Tatum. Factor V Leiden  - followed by hematology who also monitors her iron labs and is s/p iron infusions, will defer iron labs  to them     · Continued/Maintain healthy weight loss with good nutrition intakes. · Adequate hydration with at least 64oz. fluid intake. · Follow diet as discussed. · Follow vitamin and mineral recommendations as reviewed with you. · Exercise as tolerated. · Colonoscopy referral made: yes  · Mammo: utd     · Follow-up after EGD. We kindly ask that your arrive 15 minutes before your scheduled appointment time with your provider to allow our staff to room you, get your vital signs and update your chart.     · Get lab work done. . Please call the office if you need a script. It is recommended to check with your insurance BEFORE getting labs done to make sure they are covered by your policy. · Call our office if you have any problems with abdominal pain especially associated with fever, chills, nausea, vomiting or any other concerns. · All  Post-bariatric surgery patients should be aware that very small quantities of any alcohol can cause impairment and it is very possible not to feel the effect. The effect can be in the system for several hours. It is also a stomach irritant. · It is advised to AVOID alcohol, Nonsteroidal antiinflammatory drugs (NSAIDS) and nicotine of all forms . Any of these can cause stomach irritation/pain. · Discussed the effects of alcohol on a bariatric patient and the increased impairment risk. · Keep up the good work! Postsurgical Malabsorption   -At risk for malabsorption of vitamins/minerals secondary to malabsorption and restriction of intake from bariatric surgery  -Currently taking adequate postop bariatric surgery vitamin supplementation  -Next set of bariatric labs ordered for approximately 2 weeks  -Patient received education about the importance of adhering to a lifelong supplementation regimen to avoid vitamin/mineral deficiencies      Diagnoses and all orders for this visit:    Encounter for surgical aftercare following surgery of digestive system  -     Zinc; Future  -     Vitamin D 25 hydroxy; Future  -     Vitamin B1, whole blood; Future  -     Vitamin A; Future  -     PTH, intact; Future    Bariatric surgery status  -     Zinc; Future  -     Vitamin D 25 hydroxy; Future  -     Vitamin B1, whole blood; Future  -     Vitamin A; Future  -     PTH, intact; Future    Postsurgical malabsorption  -     Zinc; Future  -     Vitamin D 25 hydroxy; Future  -     Vitamin B1, whole blood; Future  -     Vitamin A; Future  -     PTH, intact;  Future    Obesity, Class II, BMI 35-39.9  -     Zinc; Future  -     Vitamin D 25 hydroxy; Future  -     Vitamin B1, whole blood; Future  -     Vitamin A; Future  -     PTH, intact; Future    Factor V Leiden ([address])  -     Zinc; Future  -     Vitamin D 25 hydroxy; Future  -     Vitamin B1, whole blood; Future  -     Vitamin A; Future  -     PTH, intact; Future    History of pulmonary embolus (PE)  -     Zinc; Future  -     Vitamin D 25 hydroxy; Future  -     Vitamin B1, whole blood; Future  -     Vitamin A; Future  -     PTH, intact; Future    Encounter for screening colonoscopy  -     Ambulatory referral for colon cancer education; Future         Subjective:      Patient ID: Santhosh Zee is a 56 y.o. female. -s/p Vertical Sleeve Gastrectomy with Dr. Marcello Chen on 4/1/2014. Presents to the office today for OD annual. She has been lost to follow since 2016. Overall fairly well but with weight regain. .    Initial: 289  Current:  Pancho  Current BMI is Body mass index is 38.51 kg/m². · Tolerating a regular diet-yes  · Eating at least 60 grams of protein per day-yes  · Following 30/60 minute rule with liquids-yes  · Drinking at least 64 ounces of fluid per day-yes  · Sufficient exercise-yes  · Using nicotine-no  · Using alcohol-no  · Supplements: MVI + b12     The following portions of the patient's history were reviewed and updated as appropriate: allergies, current medications, past family history, past medical history, past social history, past surgical history and problem list.    Review of Systems   Constitutional: Negative. Respiratory: Negative. Cardiovascular: Negative. Gastrointestinal: Positive for constipation. Negative for abdominal pain, diarrhea, nausea and vomiting. Musculoskeletal: Negative. Neurological: Negative. Psychiatric/Behavioral: Negative.           Objective:    /80   Pulse 91   Temp 97.6 °F (36.4 °C) (Tympanic)   Ht 5' 5.5" (1.664 m)   Wt 107 kg (235 lb)   BMI 38.51 kg/m²      Physical Exam

## 2023-07-25 NOTE — PATIENT INSTRUCTIONS
Follow-up after EGD. We kindly ask that your arrive 15 minutes before your scheduled appointment time with your provider to allow our staff to room you, get your vital signs and update your chart. Get lab work done. . Please call the office if you need a script. It is recommended to check with your insurance BEFORE getting labs done to make sure they are covered by your policy. Call our office if you have any problems with abdominal pain especially associated with fever, chills, nausea, vomiting or any other concerns. All  Post-bariatric surgery patients should be aware that very small quantities of any alcohol can cause impairment and it is very possible not to feel the effect. The effect can be in the system for several hours. It is also a stomach irritant. It is advised to AVOID alcohol, Nonsteroidal antiinflammatory drugs (NSAIDS) and nicotine of all forms . Any of these can cause stomach irritation/pain. Discussed the effects of alcohol on a bariatric patient and the increased impairment risk. Keep up the good work!

## 2023-08-03 ENCOUNTER — CONSULT (OUTPATIENT)
Dept: GASTROENTEROLOGY | Facility: AMBULARY SURGERY CENTER | Age: 58
End: 2023-08-03
Payer: COMMERCIAL

## 2023-08-03 VITALS
SYSTOLIC BLOOD PRESSURE: 132 MMHG | HEART RATE: 84 BPM | DIASTOLIC BLOOD PRESSURE: 78 MMHG | HEIGHT: 66 IN | OXYGEN SATURATION: 100 % | WEIGHT: 236 LBS | BODY MASS INDEX: 37.93 KG/M2

## 2023-08-03 DIAGNOSIS — Z12.11 ENCOUNTER FOR SCREENING COLONOSCOPY: Primary | ICD-10-CM

## 2023-08-03 DIAGNOSIS — K90.41 GLUTEN-SENSITIVE ENTEROPATHY: ICD-10-CM

## 2023-08-03 DIAGNOSIS — K59.09 OTHER CONSTIPATION: ICD-10-CM

## 2023-08-03 PROCEDURE — 99244 OFF/OP CNSLTJ NEW/EST MOD 40: CPT | Performed by: INTERNAL MEDICINE

## 2023-08-03 RX ORDER — SODIUM PICOSULFATE, MAGNESIUM OXIDE, AND ANHYDROUS CITRIC ACID 12; 3.5; 1 G/175ML; G/175ML; MG/175ML
LIQUID ORAL
Qty: 350 ML | Refills: 0 | Status: SHIPPED | OUTPATIENT
Start: 2023-08-03

## 2023-08-03 NOTE — PROGRESS NOTES
Consultation - [phone]  [facility]  Estefania Sharp Soren 1965 female         Chief Complaint: For EGD and colonoscopy    HPI: 57-year-old female with no significant past medical history was referred for colonoscopy. Patient reports having gluten sensitivity given the celiac disease panel was negative in the past.  She complains about a lot of stomach symptoms if she eats gluten food. Good appetite, no recent weight loss. Complaining about constipation recently. Denies any blood or mucus in the stool. Denies any heartburn or acid reflux. Denies any difficulty swallowing. Chaperon: Ms. Tiana Morales: Review of Systems   Constitutional: Negative for activity change, appetite change, chills, diaphoresis, fatigue, fever and unexpected weight change. HENT: Negative for ear discharge, ear pain, facial swelling, hearing loss, nosebleeds, sore throat, tinnitus and voice change. Eyes: Negative for pain, discharge, redness, itching and visual disturbance. Respiratory: Negative for apnea, cough, chest tightness, shortness of breath and wheezing. Cardiovascular: Negative for chest pain and palpitations. Gastrointestinal:        As noted in HPI   Endocrine: Negative for cold intolerance, heat intolerance and polyuria. Genitourinary: Negative for difficulty urinating, dysuria, flank pain, hematuria and urgency. Musculoskeletal: Positive for arthralgias and myalgias. Negative for back pain, gait problem and joint swelling. Skin: Negative for rash and wound. Neurological: Negative for dizziness, tremors, seizures, speech difficulty, light-headedness, numbness and headaches. Hematological: Negative for adenopathy. Does not bruise/bleed easily. Psychiatric/Behavioral: Negative for agitation, behavioral problems and confusion. The patient is not nervous/anxious.          Past Medical History:   Diagnosis Date   • Abnormal Pap smear of cervix 05/2016 5/2016 LGSIL, + other HPV; 6/2016 Colpo JANA 1. 12/2021 pap neg/ HPV neg. • Anemia    • DVT (deep venous thrombosis) (HCC)    • Factor V Leiden mutation (HCC)    • GERD (gastroesophageal reflux disease)    • Obesity, Class II, BMI 35-39.9    • PONV (postoperative nausea and vomiting)    • Pulmonary embolism (HCC)    • S/P gastric surgery     gastric sleeve    • Sleep apnea     wears c-pap, Last assessed: 3/7/14      Past Surgical History:   Procedure Laterality Date   • BARIATRIC SURGERY     • CHOLECYSTECTOMY     • COLONOSCOPY      complete, resolved: 1996   • EGD AND COLONOSCOPY N/A 03/08/2018    Procedure: EGD AND COLONOSCOPY;  Surgeon: Hailey Spain MD;  Location: [facility]; Service: Gastroenterology   • GALLBLADDER SURGERY     • IVC FILTER INSERTION  04/01/2014    Radiologic Supervision: [place] Filter Placement in IVC   • MOUTH SURGERY     • STOMACH SURGERY  04/01/2014    sleeve, Gastric surgery for Morbid Obesity Laparoscopic Longitudinal Gastrectomy, per allscripts   • UPPER GASTROINTESTINAL ENDOSCOPY       Social History     Socioeconomic History   • Marital status: /Civil Union     Spouse name: Not on file   • Number of children: 1   • Years of education: Not on file   • Highest education level: Not on file   Occupational History   • Not on file   Tobacco Use   • Smoking status: Never   • Smokeless tobacco: Never   • Tobacco comments:     current non-smoker, per allscripts   Vaping Use   • Vaping Use: Never used   Substance and Sexual Activity   • Alcohol use: Yes     Comment: social   • Drug use: No   • Sexual activity: Yes     Partners: Male     Birth control/protection: I.U.D.    Other Topics Concern   • Not on file   Social History Narrative    , per allscripts    Exercise frequency (times/week)     Social Determinants of Health     Financial Resource Strain: Not on file   Food Insecurity: Not on file   Transportation Needs: Not on file   Physical Activity: Not on file   Stress: Not on file   Social Connections: Not on file   Intimate Partner Violence: Not on file   Housing Stability: Not on file     Family History   Problem Relation Age of Onset   • Osteoporosis Mother    • Diabetes Father    • Heart disease Father    • Arthritis Father    • Heart failure Father    • Hypertension Father    • Ovarian cancer Maternal Grandmother 80   • No Known Problems Maternal Grandfather    • No Known Problems Paternal Grandmother    • No Known Problems Paternal Grandfather    • No Known Problems Paternal Aunt    • No Known Problems Paternal Aunt    • No Known Problems Paternal Aunt    • No Known Problems Paternal Aunt    • Colon cancer Neg Hx    • Breast cancer Neg Hx    • Uterine cancer Neg Hx      Pseudoephedrine and Latex  Current Outpatient Medications   Medication Sig Dispense Refill   • ferrous sulfate 325 (65 Fe) mg tablet Take 325 mg by mouth daily with breakfast     • levonorgestrel (MIRENA) 20 MCG/24HR IUD 1 each by Intrauterine route once     • Multiple Vitamin (MULTIVITAMIN) capsule Take 1 capsule by mouth daily     • naproxen sodium (ALEVE) 220 MG tablet Take 2 tablets (440 mg total) by mouth 2 (two) times a day with meals (Patient taking differently: Take 440 mg by mouth in the morning)     • sodium picosulfate, magnesium oxide, citric acid (Clenpiq) oral solution Take 175 mL (1 bottle) the evening before the colonoscopy, between 5 PM and 9 PM, followed by a second 175 mL bottle 5 hours before the colonoscopy. 350 mL 0   • meloxicam (MOBIC) 15 mg tablet TAKE 1 TABLET (15 MG TOTAL) BY MOUTH DAILY.  (Patient not taking: Reported on 2/10/2023) 30 tablet 0   • methocarbamol (ROBAXIN) 500 mg tablet TAKE 1 TABLET (500 MG TOTAL) BY MOUTH DAILY AT BEDTIME AS NEEDED FOR MUSCLE SPASMS (Patient not taking: Reported on 2/10/2023) 30 tablet 5   • ondansetron (ZOFRAN) 4 mg tablet Take 1 tablet (4 mg total) by mouth every 8 (eight) hours as needed for nausea or vomiting (Patient not taking: Reported on 7/25/2023) 20 tablet 0 • traMADol (Ultram) 50 mg tablet Take 1 tablet (50 mg total) by mouth every 8 (eight) hours as needed for moderate pain (Patient not taking: Reported on 7/25/2023) 10 tablet 0     No current facility-administered medications for this visit. Blood pressure 132/78, pulse 84, height 5' 5.5" (1.664 m), weight 107 kg (236 lb), SpO2 100 %, not currently breastfeeding. PHYSICAL EXAM: Physical Exam  Constitutional:       Appearance: Normal appearance. She is well-developed. HENT:      Head: Normocephalic and atraumatic. Nose: Nose normal.   Eyes:      Conjunctiva/sclera: Conjunctivae normal.   Neck:      Thyroid: No thyromegaly. Vascular: No JVD. Trachea: No tracheal deviation. Cardiovascular:      Rate and Rhythm: Normal rate and regular rhythm. Heart sounds: Normal heart sounds. No murmur heard. No friction rub. No gallop. Pulmonary:      Effort: Pulmonary effort is normal. No respiratory distress. Breath sounds: Normal breath sounds. No wheezing or rales. Abdominal:      General: Bowel sounds are normal. There is no distension. Palpations: Abdomen is soft. There is no mass. Tenderness: There is no abdominal tenderness. There is no guarding. Hernia: No hernia is present. Musculoskeletal:         General: No tenderness or deformity. Cervical back: Neck supple. Right lower leg: No edema. Left lower leg: No edema. Lymphadenopathy:      Cervical: No cervical adenopathy. Skin:     General: Skin is warm and dry. Findings: No erythema or rash. Neurological:      Mental Status: She is alert and oriented to person, place, and time. Psychiatric:         Mood and Affect: Mood normal.         Behavior: Behavior normal.         Thought Content:  Thought content normal.          Lab Results   Component Value Date    WBC 4.4 11/16/2021    HGB 11.7 11/16/2021    HCT 37.2 11/16/2021    MCV 77.0 (L) 11/16/2021     11/16/2021     Lab Results Component Value Date    GLUCOSE 104 04/03/2014    CALCIUM 9.1 11/16/2021     04/03/2014    K 4.2 11/16/2021    CO2 24 11/16/2021     11/16/2021    BUN 13 11/16/2021    CREATININE 0.70 11/16/2021     Lab Results   Component Value Date    ALT 13 11/16/2021    AST 13 11/16/2021    ALKPHOS 80 11/16/2021     Lab Results   Component Value Date    INR 5.2 (H) 12/10/2021    INR 6.3 (H) 12/08/2021    INR 2.7 (H) 10/21/2021    PROTIME 52.2 (H) 12/10/2021    PROTIME 62.6 (H) 12/08/2021    PROTIME 27.2 (H) 10/21/2021       No results found. ASSESSMENT & PLAN:    Gluten-sensitive enteropathy  Patient reports having issues if she eats gluten and was told about gluten sensitivity in the past.    -Check celiac disease panel    -Schedule EGD along with colonoscopy    -Continue with gluten-free diet    Encounter for screening colonoscopy  Screening for colon cancer - patient is at average risk for colon cancer screening. Rule out colorectal lesions including polyps or malignancy.    -Schedule for colonoscopy. -High-fiber diet.    -Patient was given instructions about the colonoscopy prep.    -Patient was explained about the risks and benefits of the procedure. Risks including but not limited to bleeding, infection, perforation were explained in detail. Also explained about less than 100% sensitivity with the exam and other alternatives.     Other constipation  Patient reports having issues with constipation recently.    -Advised to take fiber supplements and MiraLAX also if needed

## 2023-08-03 NOTE — ASSESSMENT & PLAN NOTE
Patient reports having issues if she eats gluten and was told about gluten sensitivity in the past.    -Check celiac disease panel    -Schedule EGD along with colonoscopy    -Continue with gluten-free diet

## 2023-08-03 NOTE — ASSESSMENT & PLAN NOTE
Screening for colon cancer - patient is at average risk for colon cancer screening. Rule out colorectal lesions including polyps or malignancy.    -Schedule for colonoscopy. -High-fiber diet.    -Patient was given instructions about the colonoscopy prep.    -Patient was explained about the risks and benefits of the procedure. Risks including but not limited to bleeding, infection, perforation were explained in detail. Also explained about less than 100% sensitivity with the exam and other alternatives.

## 2023-08-03 NOTE — PATIENT INSTRUCTIONS
Scheduled date of EGD/colonoscopy (as of today):10/19/2023  Physician performing EGD/colonoscopy: Sunshine Wheat  Location of EGD/colonoscopy:  [facility]   Desired bowel prep reviewed with patient: Clenpiq  Instructions reviewed with patient by: Jerald Barber  Clearances:  None

## 2023-08-03 NOTE — ASSESSMENT & PLAN NOTE
Patient reports having issues with constipation recently.    -Advised to take fiber supplements and MiraLAX also if needed

## 2023-10-02 ENCOUNTER — TELEPHONE (OUTPATIENT)
Dept: FAMILY MEDICINE CLINIC | Facility: CLINIC | Age: 58
End: 2023-10-02

## 2023-10-02 NOTE — TELEPHONE ENCOUNTER
Patient called in and asked if stephen can send her a script to her pharmacy. Patient is experiencing UTI sx.  Please advise thank you

## 2023-10-03 ENCOUNTER — OFFICE VISIT (OUTPATIENT)
Dept: FAMILY MEDICINE CLINIC | Facility: CLINIC | Age: 58
End: 2023-10-03
Payer: COMMERCIAL

## 2023-10-03 ENCOUNTER — APPOINTMENT (OUTPATIENT)
Dept: RADIOLOGY | Facility: MEDICAL CENTER | Age: 58
End: 2023-10-03
Payer: COMMERCIAL

## 2023-10-03 VITALS
DIASTOLIC BLOOD PRESSURE: 88 MMHG | SYSTOLIC BLOOD PRESSURE: 136 MMHG | OXYGEN SATURATION: 97 % | HEART RATE: 97 BPM | TEMPERATURE: 97.8 F

## 2023-10-03 DIAGNOSIS — N30.00 ACUTE CYSTITIS WITHOUT HEMATURIA: ICD-10-CM

## 2023-10-03 DIAGNOSIS — R35.0 FREQUENT URINATION: Primary | ICD-10-CM

## 2023-10-03 DIAGNOSIS — M25.50 ARTHRALGIA, UNSPECIFIED JOINT: ICD-10-CM

## 2023-10-03 LAB
SL AMB  POCT GLUCOSE, UA: NORMAL
SL AMB LEUKOCYTE ESTERASE,UA: NORMAL
SL AMB POCT BILIRUBIN,UA: NORMAL
SL AMB POCT BLOOD,UA: NORMAL
SL AMB POCT CLARITY,UA: NORMAL
SL AMB POCT COLOR,UA: NORMAL
SL AMB POCT KETONES,UA: NORMAL
SL AMB POCT NITRITE,UA: NORMAL
SL AMB POCT PH,UA: 5.5
SL AMB POCT SPECIFIC GRAVITY,UA: 1.01
SL AMB POCT URINE PROTEIN: NORMAL
SL AMB POCT UROBILINOGEN: 0.2

## 2023-10-03 PROCEDURE — 73130 X-RAY EXAM OF HAND: CPT

## 2023-10-03 PROCEDURE — 87077 CULTURE AEROBIC IDENTIFY: CPT | Performed by: NURSE PRACTITIONER

## 2023-10-03 PROCEDURE — 99214 OFFICE O/P EST MOD 30 MIN: CPT | Performed by: NURSE PRACTITIONER

## 2023-10-03 PROCEDURE — 87186 SC STD MICRODIL/AGAR DIL: CPT | Performed by: NURSE PRACTITIONER

## 2023-10-03 PROCEDURE — 87086 URINE CULTURE/COLONY COUNT: CPT | Performed by: NURSE PRACTITIONER

## 2023-10-03 PROCEDURE — 81002 URINALYSIS NONAUTO W/O SCOPE: CPT | Performed by: NURSE PRACTITIONER

## 2023-10-03 RX ORDER — NITROFURANTOIN 25; 75 MG/1; MG/1
100 CAPSULE ORAL 2 TIMES DAILY
Qty: 14 CAPSULE | Refills: 0 | Status: SHIPPED | OUTPATIENT
Start: 2023-10-03 | End: 2023-10-10

## 2023-10-03 NOTE — PROGRESS NOTES
Name: Mona Santiago      : 1965      MRN: [id number]  Encounter Provider: EZEQUIEL Hoover  Encounter Date: 10/3/2023   Encounter department: [facility]    Assessment & Plan     1. Frequent urination  -     POCT urine dip  -     Urine culture; Future  -     Urine culture  -     nitrofurantoin (MACROBID) 100 mg capsule; Take 1 capsule (100 mg total) by mouth 2 (two) times a day for 7 days    2. Acute cystitis without hematuria  Comments:  start macrobid, urine to culture  Orders:  -     nitrofurantoin (MACROBID) 100 mg capsule; Take 1 capsule (100 mg total) by mouth 2 (two) times a day for 7 days    3. Arthralgia, unspecified joint  Comments:  await gi work up. consider tylneol nsaids and june 2 ineffective and contraindicated bariatric status   Orders:  -     XR hand 3+ vw left; Future; Expected date: 10/03/2023  -     XR hand 3+ vw right; Future; Expected date: 10/03/2023             Subjective      Patient presents with burning with urination. Has increased water.,   She had increased frequency. Her symptoms since Friday have worsened and also felt gritty     She is concerned that her increased stiffness in her joints and nodules in her toes. Review of Systems   Constitutional: Negative. Gastrointestinal: Positive for abdominal pain. Genitourinary: Positive for frequency. Musculoskeletal: Positive for arthralgias. Neurological: Positive for dizziness.        Current Outpatient Medications on File Prior to Visit   Medication Sig   • ferrous sulfate 325 (65 Fe) mg tablet Take 325 mg by mouth daily with breakfast   • levonorgestrel (MIRENA) 20 MCG/24HR IUD 1 each by Intrauterine route once   • Multiple Vitamin (MULTIVITAMIN) capsule Take 1 capsule by mouth daily   • sodium picosulfate, magnesium oxide, citric acid (Clenpiq) oral solution Take 175 mL (1 bottle) the evening before the colonoscopy, between 5 PM and 9 PM, followed by a second 175 mL bottle 5 hours before the colonoscopy. (Patient not taking: Reported on 10/3/2023)       Objective     /88 (BP Location: Left arm, Patient Position: Sitting, Cuff Size: Large)   Pulse 97   Temp 97.8 °F (36.6 °C) (Temporal)   SpO2 97%     Physical Exam  Vitals and nursing note reviewed. Constitutional:       Appearance: Normal appearance. She is well-developed. She is obese. HENT:      Head: Normocephalic and atraumatic. Eyes:      Extraocular Movements: Extraocular movements intact. Conjunctiva/sclera: Conjunctivae normal.      Pupils: Pupils are equal, round, and reactive to light. Cardiovascular:      Rate and Rhythm: Normal rate and regular rhythm. Heart sounds: Normal heart sounds, S1 normal and S2 normal.   Pulmonary:      Effort: Pulmonary effort is normal.      Breath sounds: Normal breath sounds. Musculoskeletal:      Right lower leg: No edema. Left lower leg: No edema. Neurological:      Mental Status: She is alert and oriented to person, place, and time. Psychiatric:         Mood and Affect: Mood normal.         Behavior: Behavior normal.         Thought Content:  Thought content normal.         Judgment: Judgment normal.          EZEQUIEL Esposito

## 2023-10-05 LAB — BACTERIA UR CULT: ABNORMAL

## 2023-10-18 ENCOUNTER — TELEPHONE (OUTPATIENT)
Dept: FAMILY MEDICINE CLINIC | Facility: CLINIC | Age: 58
End: 2023-10-18

## 2023-10-18 NOTE — TELEPHONE ENCOUNTER
Patient notified of results. Patient advises she will reach out to provider via portal in reference to next step since she is still experiencing hand stiffness.

## 2023-10-19 ENCOUNTER — HOSPITAL ENCOUNTER (OUTPATIENT)
Dept: GASTROENTEROLOGY | Facility: HOSPITAL | Age: 58
Setting detail: OUTPATIENT SURGERY
End: 2023-10-19
Attending: INTERNAL MEDICINE
Payer: COMMERCIAL

## 2023-10-19 ENCOUNTER — ANESTHESIA (OUTPATIENT)
Dept: GASTROENTEROLOGY | Facility: HOSPITAL | Age: 58
End: 2023-10-19

## 2023-10-19 ENCOUNTER — ANESTHESIA EVENT (OUTPATIENT)
Dept: GASTROENTEROLOGY | Facility: HOSPITAL | Age: 58
End: 2023-10-19

## 2023-10-19 VITALS
BODY MASS INDEX: 38.32 KG/M2 | OXYGEN SATURATION: 97 % | WEIGHT: 230 LBS | DIASTOLIC BLOOD PRESSURE: 70 MMHG | HEIGHT: 65 IN | HEART RATE: 76 BPM | RESPIRATION RATE: 18 BRPM | SYSTOLIC BLOOD PRESSURE: 139 MMHG | TEMPERATURE: 96.6 F

## 2023-10-19 DIAGNOSIS — K90.41 GLUTEN-SENSITIVE ENTEROPATHY: ICD-10-CM

## 2023-10-19 DIAGNOSIS — Z12.11 ENCOUNTER FOR SCREENING COLONOSCOPY: ICD-10-CM

## 2023-10-19 PROCEDURE — 88305 TISSUE EXAM BY PATHOLOGIST: CPT | Performed by: STUDENT IN AN ORGANIZED HEALTH CARE EDUCATION/TRAINING PROGRAM

## 2023-10-19 PROCEDURE — 43239 EGD BIOPSY SINGLE/MULTIPLE: CPT | Performed by: INTERNAL MEDICINE

## 2023-10-19 PROCEDURE — 45378 DIAGNOSTIC COLONOSCOPY: CPT | Performed by: INTERNAL MEDICINE

## 2023-10-19 RX ORDER — PROPOFOL 10 MG/ML
INJECTION, EMULSION INTRAVENOUS AS NEEDED
Status: DISCONTINUED | OUTPATIENT
Start: 2023-10-19 | End: 2023-10-19

## 2023-10-19 RX ORDER — LIDOCAINE HYDROCHLORIDE 20 MG/ML
INJECTION, SOLUTION EPIDURAL; INFILTRATION; INTRACAUDAL; PERINEURAL AS NEEDED
Status: DISCONTINUED | OUTPATIENT
Start: 2023-10-19 | End: 2023-10-19

## 2023-10-19 RX ORDER — GLYCOPYRROLATE 0.2 MG/ML
INJECTION INTRAMUSCULAR; INTRAVENOUS AS NEEDED
Status: DISCONTINUED | OUTPATIENT
Start: 2023-10-19 | End: 2023-10-19

## 2023-10-19 RX ORDER — SODIUM CHLORIDE, SODIUM LACTATE, POTASSIUM CHLORIDE, CALCIUM CHLORIDE 600; 310; 30; 20 MG/100ML; MG/100ML; MG/100ML; MG/100ML
INJECTION, SOLUTION INTRAVENOUS CONTINUOUS PRN
Status: DISCONTINUED | OUTPATIENT
Start: 2023-10-19 | End: 2023-10-19

## 2023-10-19 RX ADMIN — PROPOFOL 150 MG: 10 INJECTION, EMULSION INTRAVENOUS at 08:05

## 2023-10-19 RX ADMIN — SODIUM CHLORIDE, SODIUM LACTATE, POTASSIUM CHLORIDE, AND CALCIUM CHLORIDE: .6; .31; .03; .02 INJECTION, SOLUTION INTRAVENOUS at 07:56

## 2023-10-19 RX ADMIN — GLYCOPYRROLATE 0.1 MG: 0.2 INJECTION INTRAMUSCULAR; INTRAVENOUS at 08:02

## 2023-10-19 RX ADMIN — LIDOCAINE HYDROCHLORIDE 80 MG: 20 INJECTION, SOLUTION EPIDURAL; INFILTRATION; INTRACAUDAL at 08:02

## 2023-10-19 RX ADMIN — PROPOFOL 50 MG: 10 INJECTION, EMULSION INTRAVENOUS at 08:09

## 2023-10-19 RX ADMIN — PROPOFOL 150 MCG/KG/MIN: 10 INJECTION, EMULSION INTRAVENOUS at 08:06

## 2023-10-19 NOTE — ANESTHESIA PREPROCEDURE EVALUATION
Procedure:  COLONOSCOPY  EGD    Relevant Problems   HEMATOLOGY   (+) Acquired iron deficiency anemia due to decreased absorption   (+) Anemia        Physical Exam    Airway    Mallampati score: II  TM Distance: >3 FB  Neck ROM: full     Dental   No notable dental hx     Cardiovascular  Rhythm: regular, Rate: normal    Pulmonary   Breath sounds clear to auscultation    Other Findings        Anesthesia Plan  ASA Score- 2     Anesthesia Type- IV sedation with anesthesia with ASA Monitors. Additional Monitors:     Airway Plan:            Plan Factors-Exercise tolerance (METS): >4 METS. Chart reviewed. Existing labs reviewed. Patient summary reviewed. Patient is not a current smoker. Induction- intravenous. Postoperative Plan-     Informed Consent- Anesthetic plan and risks discussed with patient. I personally reviewed this patient with the CRNA. Discussed and agreed on the Anesthesia Plan with the CRNA. Vidal Martins

## 2023-10-19 NOTE — ANESTHESIA POSTPROCEDURE EVALUATION
Post-Op Assessment Note    CV Status:  Stable    Pain management: adequate     Mental Status:  Arousable   Hydration Status:  Euvolemic   PONV Controlled:  Controlled   Airway Patency:  Patent      Post Op Vitals Reviewed: Yes      Staff: CRNA     No notable events documented.     BP   129/61   Temp     Pulse  76   Resp   16   SpO2   96

## 2023-10-19 NOTE — H&P
History and Physical - [facility]  Murrell Lefort 56 y.o. female MRN: [id number]        HPI: 47-year-old female with history of gastric sleeve surgery reports having stomach issues if she eats gluten diet. Regular bowel movements. Historical Information   Past Medical History:   Diagnosis Date    Abnormal Pap smear of cervix 05/2016 5/2016 LGSIL, + other HPV; 6/2016 Colpo JANA 1. 12/2021 pap neg/ HPV neg. Anemia     DVT (deep venous thrombosis) ([facility])     Factor V Leiden mutation (HCC)     GERD (gastroesophageal reflux disease)     Obesity, Class II, BMI 35-39.9     PONV (postoperative nausea and vomiting)     Pulmonary embolism ([facility])     S/P gastric surgery     gastric sleeve     Sleep apnea     wears c-pap, Last assessed: 3/7/14     Past Surgical History:   Procedure Laterality Date    BARIATRIC SURGERY      CHOLECYSTECTOMY      COLONOSCOPY      complete, resolved: 1996    EGD AND COLONOSCOPY N/A 03/08/2018    Procedure: EGD AND COLONOSCOPY;  Surgeon: Shamir Vanegas MD;  Location: [facility] GI LAB;   Service: Gastroenterology    GALLBLADDER SURGERY      IVC FILTER INSERTION  04/01/2014    Radiologic Supervision: [place] Filter Placement in IVC    MOUTH SURGERY      STOMACH SURGERY  04/01/2014    sleeve, Gastric surgery for Morbid Obesity Laparoscopic Longitudinal Gastrectomy, per allscripts    UPPER GASTROINTESTINAL ENDOSCOPY       Social History   Social History     Substance and Sexual Activity   Alcohol Use Yes    Comment: social     Social History     Substance and Sexual Activity   Drug Use No     Social History     Tobacco Use   Smoking Status Never   Smokeless Tobacco Never   Tobacco Comments    current non-smoker, per allscripts     Family History   Problem Relation Age of Onset    Osteoporosis Mother     Diabetes Father     Heart disease Father     Arthritis Father     Heart failure Father     Hypertension Father     Ovarian cancer Maternal Grandmother 80    No Known Problems Maternal Grandfather     No Known Problems Paternal Grandmother     No Known Problems Paternal Grandfather     No Known Problems Paternal Aunt     No Known Problems Paternal Aunt     No Known Problems Paternal Aunt     No Known Problems Paternal Aunt     Colon cancer Neg Hx     Breast cancer Neg Hx     Uterine cancer Neg Hx        Meds/Allergies     (Not in a hospital admission)      Allergies   Allergen Reactions    Pseudoephedrine Tachycardia    Latex Rash       Objective     Blood pressure 133/76, pulse 88, temperature (!) 97.4 °F (36.3 °C), temperature source Temporal, resp. rate 18, height 5' 5" (1.651 m), weight 104 kg (230 lb), SpO2 96 %, not currently breastfeeding.     PHYSICAL EXAM:    Gen: NAD  CV: S1 & S2 normal, RRR  CHEST: Clear to auscultate  ABD: soft, NT/ND, good bowel sounds  EXT: no edema    ASSESSMENT:     Probable gluten sensitive enteropathy, screening for colon cancer    PLAN:    EGD and colonoscopy

## 2023-10-24 PROCEDURE — 88305 TISSUE EXAM BY PATHOLOGIST: CPT | Performed by: STUDENT IN AN ORGANIZED HEALTH CARE EDUCATION/TRAINING PROGRAM

## 2023-11-17 ENCOUNTER — OFFICE VISIT (OUTPATIENT)
Dept: BARIATRICS | Facility: CLINIC | Age: 58
End: 2023-11-17
Payer: COMMERCIAL

## 2023-11-17 VITALS
WEIGHT: 234.5 LBS | BODY MASS INDEX: 37.69 KG/M2 | TEMPERATURE: 97.5 F | DIASTOLIC BLOOD PRESSURE: 68 MMHG | SYSTOLIC BLOOD PRESSURE: 110 MMHG | HEART RATE: 90 BPM | HEIGHT: 66 IN

## 2023-11-17 DIAGNOSIS — R63.5 ABNORMAL WEIGHT GAIN: Primary | ICD-10-CM

## 2023-11-17 DIAGNOSIS — N92.1 MENOMETRORRHAGIA: ICD-10-CM

## 2023-11-17 DIAGNOSIS — Z98.84 BARIATRIC SURGERY STATUS: ICD-10-CM

## 2023-11-17 DIAGNOSIS — E66.9 OBESITY, CLASS II, BMI 35-39.9: ICD-10-CM

## 2023-11-17 PROCEDURE — 99213 OFFICE O/P EST LOW 20 MIN: CPT | Performed by: SURGERY

## 2023-11-17 NOTE — PROGRESS NOTES
OFFICE VISIT - BARIATRIC SURGERY  Grace Ma 56 y.o. female MRN: [id number]  Unit/Bed#:  Encounter: [id number]      HPI:  Grace Ma is a 56 y.o. female status post laparoscopic sleeve gastrectomy by Dr. Ema Parrish on 4/2014. Comes to the office today with complaints of weight regain. She is here to be evaluated for possible revision and review of EGD    Subjective   Patient is here to be evaluated for her weight regain. She states following her sleeve gastrectomy she was doing well and holding steady at 180-190lbs. Her lowest weight following surgery was 160lbs but was able to maintain her 180-190 weight range for years. Following a COVID infection in 2020 she states she has gained a significant amount of weight and has been unable to lose it. She is now up to 234lbs with BMI of 38. She states she doesn't eat bad food and portions are small. She is exercising and despite all this is unable to lose weight. Denies heartburn. Denies smoking. Review of Systems   All other systems reviewed and are negative. Historical Information   Past Medical History:   Diagnosis Date    Abnormal Pap smear of cervix 05/2016 5/2016 LGSIL, + other HPV; 6/2016 Colpo JANA 1. 12/2021 pap neg/ HPV neg. Anemia     DVT (deep venous thrombosis) (HCC)     Factor V Leiden mutation (HCC)     GERD (gastroesophageal reflux disease)     Obesity, Class II, BMI 35-39.9     PONV (postoperative nausea and vomiting)     Pulmonary embolism ([facility])     S/P gastric surgery     gastric sleeve     Sleep apnea     wears c-pap, Last assessed: 3/7/14     Past Surgical History:   Procedure Laterality Date    BARIATRIC SURGERY      CHOLECYSTECTOMY      COLONOSCOPY      complete, resolved: 1996    EGD AND COLONOSCOPY N/A 03/08/2018    Procedure: EGD AND COLONOSCOPY;  Surgeon: Giuseppe Merida MD;  Location: [facility] GI LAB;   Service: Gastroenterology    GALLBLADDER SURGERY      IVC FILTER INSERTION  04/01/2014    Radiologic Supervision: Cordelia Ro[place] Filter Placement in IVC    MOUTH SURGERY      STOMACH SURGERY  04/01/2014    sleeve, Gastric surgery for Morbid Obesity Laparoscopic Longitudinal Gastrectomy, per allscripts    UPPER GASTROINTESTINAL ENDOSCOPY       Social History   Social History     Substance and Sexual Activity   Alcohol Use Yes    Comment: social     Social History     Substance and Sexual Activity   Drug Use No     Social History     Tobacco Use   Smoking Status Never   Smokeless Tobacco Never   Tobacco Comments    current non-smoker, per allscripts       Objective       Current Vitals:        Invasive Devices       None                   Physical Exam  Constitutional:       Appearance: Normal appearance. HENT:      Head: Atraumatic. Cardiovascular:      Rate and Rhythm: Normal rate. Pulses: Normal pulses. Pulmonary:      Effort: Pulmonary effort is normal.   Skin:     General: Skin is warm. Neurological:      General: No focal deficit present. Mental Status: She is alert and oriented to person, place, and time. Pathology, and Other Studies: I have personally reviewed pertinent reports. Assessment/PLAN:    Merlyn Diana is a 56 y.o. female status post laparoscopic sleeve gastrectomy in 2014 with Dr. Senait Tenorio here for weight Terisa Javier. Workup thus far reviewed and discussed with patient:    UGI        EGD  DATE OF SERVICE:  10/19/23     PHYSICIAN(S):  Attending:   Pheobe Heimlich, MD      Fellow:   No Staff Documented         INDICATION:  Gluten-sensitive enteropathy     POST-OP DIAGNOSIS:  See the impression below. PREPROCEDURE:  Informed consent was obtained for the procedure, including sedation. Risks of perforation, hemorrhage, adverse drug reaction and aspiration were discussed. The patient was placed in the left lateral decubitus position. Patient was explained about the risks and benefits of the procedure.  Risks including but not limited to bleeding, infection, and perforation were explained in detail. Also explained about less than 100% sensitivity with the exam and other alternatives. PROCEDURE: EGD     DETAILS OF PROCEDURE:   Patient was taken to the procedure room where a time out was performed to confirm correct patient and correct procedure. The patient underwent monitored anesthesia care, which was administered by an anesthesia professional. The patient's blood pressure, heart rate, level of consciousness, respirations and oxygen were monitored throughout the procedure. The scope was advanced to the second part of the duodenum. Retroflexion was performed in the fundus. The patient experienced no blood loss. The procedure was not difficult. The patient tolerated the procedure well. There were no apparent adverse events. ANESTHESIA INFORMATION:  ASA: II  Anesthesia Type: IV Sedation with Anesthesia     MEDICATIONS:  No administrations occurring from 0759 to 0823 on 10/19/23         FINDINGS:  The esophagus appeared normal.  The stomach appeared normal. Performed random biopsy using biopsy forceps. Status post gastric sleeve surgery. The duodenum appeared normal. Performed random biopsy using biopsy forceps. SPECIMENS:  ID Type Source Tests Collected by Time Destination   1 : gastric body cold bx Tissue Stomach TISSUE EXAM Conchis Delatorre MD 10/19/2023  8:08 AM     2 : duodenum cold bx Tissue Duodenum TISSUE EXAM Conchis Delatorre MD 10/19/2023  8:09 AM              IMPRESSION:  The esophagus appeared normal.  The stomach appeared normal. Performed random biopsy. The duodenum appeared normal. Performed random biopsy. RECOMMENDATION:    Await pathology results                  Conchis Delatorre MD         Pathology from EGD   A. Stomach, biopsy:  -   Gastric antral/transitional mucosa with no significant histopathologic alterations. -   Negative for intestinal metaplasia and dysplasia. -   Negative for Helicobacter pylori-type organisms on H&E stain.      B. Duodenum, biopsy:  - Duodenal mucosa with normal villous architecture and no significant histopathologic change. -   No evidence of celiac disease.       MANOMETRY/pH Study        GASTRIC EMPTYING STUDY      --------------------------------------------------------------------    Level 2 revision pathway  Should get risk stratified by her hematologist should she qualify for revisional surgery              Mic Willams MD  Bariatric Surgery  11/17/2023  9:54 AM

## 2023-11-20 ENCOUNTER — TELEPHONE (OUTPATIENT)
Dept: BARIATRICS | Facility: CLINIC | Age: 58
End: 2023-11-20

## 2023-11-20 NOTE — TELEPHONE ENCOUNTER
[facility] Patient Eligibility and Benefit Details for Surgery  *Not a guarantee of payment    Today's Date: 11/20/2023    Insurance CBC   Ins Phone: [phone]    Effective date: 2/14/2023  Term Date: Current    Representative name: Kota Garcia. Reference number for call: [id number]    Does the patient have the benefit written on policy: Yes        If applicable: Are revisions covered under this policy: Yes          If patient is considering paying out of pocket, provide information: Financial Counselor/  [phone]    Having health insurance coverage is not a guarantee that your individual plan covers weight-loss surgery or that you will be approved for surgery.

## 2023-11-27 ENCOUNTER — OFFICE VISIT (OUTPATIENT)
Dept: FAMILY MEDICINE CLINIC | Facility: CLINIC | Age: 58
End: 2023-11-27
Payer: COMMERCIAL

## 2023-11-27 VITALS
OXYGEN SATURATION: 97 % | SYSTOLIC BLOOD PRESSURE: 116 MMHG | DIASTOLIC BLOOD PRESSURE: 70 MMHG | BODY MASS INDEX: 39.32 KG/M2 | HEIGHT: 65 IN | TEMPERATURE: 99.5 F | WEIGHT: 236 LBS | HEART RATE: 103 BPM

## 2023-11-27 DIAGNOSIS — H00.11 CHALAZION OF RIGHT UPPER EYELID: Primary | ICD-10-CM

## 2023-11-27 DIAGNOSIS — D50.9 IRON DEFICIENCY ANEMIA, UNSPECIFIED IRON DEFICIENCY ANEMIA TYPE: ICD-10-CM

## 2023-11-27 DIAGNOSIS — H00.11 CHALAZION OF RIGHT UPPER EYELID: ICD-10-CM

## 2023-11-27 PROCEDURE — 99214 OFFICE O/P EST MOD 30 MIN: CPT | Performed by: NURSE PRACTITIONER

## 2023-11-27 RX ORDER — ERYTHROMYCIN 5 MG/G
0.5 OINTMENT OPHTHALMIC
Qty: 42 G | Refills: 0 | Status: SHIPPED | OUTPATIENT
Start: 2023-11-27 | End: 2023-12-04

## 2023-11-27 RX ORDER — ERYTHROMYCIN 5 MG/G
0.5 OINTMENT OPHTHALMIC
Qty: 7 G | Refills: 0 | Status: CANCELLED | OUTPATIENT
Start: 2023-11-27 | End: 2023-12-04

## 2023-11-27 RX ORDER — ERYTHROMYCIN 5 MG/G
0.5 OINTMENT OPHTHALMIC
Qty: 42 G | Refills: 0 | Status: SHIPPED | OUTPATIENT
Start: 2023-11-27 | End: 2023-11-27

## 2023-11-27 RX ORDER — POLYMYXIN B SULFATE AND TRIMETHOPRIM 1; 10000 MG/ML; [USP'U]/ML
1 SOLUTION OPHTHALMIC EVERY 4 HOURS
Qty: 10 ML | Refills: 0 | Status: SHIPPED | OUTPATIENT
Start: 2023-11-27 | End: 2023-11-27

## 2023-11-27 NOTE — PROGRESS NOTES
Name: Suraj Cardnoa      : 1965      MRN: [id number]  Encounter Provider: EZEQUIEL Whyte  Encounter Date: 2023   Encounter department: [place] [address]     1. Chalazion of right upper eyelid  Assessment & Plan:  Erythromycin ointment was ordered to be used on the affected area as directed. Patient was also advised to continue using warm compresses on the affected area 20 minutes on and 20 minutes off throughout the day. Orders:  -     erythromycin (ILOTYCIN) ophthalmic ointment; Administer 0.5 inches to the right eye every 4 (four) hours for 7 days    2. Iron deficiency anemia, unspecified iron deficiency anemia type  Assessment & Plan:  Patient does have a repeat ferritin level ordered to be completed prior to her next office visit. Subjective      Chalazion of right eye: Patient reports over the past 6 days she has noted redness and irritation of her right upper eyelid. She also reports over the past 24 hours she has noted increased thick tan discharge from her right eye as well. Patient does report some associated congestion but denies any vision changes or pain in her eye. Anemia: Patient's most recent iron level was slightly low. Patient is currently taking a daily iron supplement. Review of Systems   Constitutional:  Negative for chills and fever. HENT:  Positive for congestion. Negative for ear pain, postnasal drip, rhinorrhea, sinus pressure, sinus pain and sore throat. Eyes:  Positive for discharge (right). Negative for photophobia, pain, redness, itching and visual disturbance. Redness and irritation of right upper eyelid   Respiratory:  Negative for cough, chest tightness, shortness of breath and wheezing. Cardiovascular:  Negative for chest pain, palpitations and leg swelling. Gastrointestinal:  Negative for abdominal pain, constipation, diarrhea, nausea and vomiting.    Endocrine: Negative for cold intolerance and heat intolerance. Genitourinary:  Negative for decreased urine volume, dysuria and hematuria. Musculoskeletal:  Negative for arthralgias, back pain and myalgias. Skin:  Negative for color change and rash. Allergic/Immunologic: Negative for environmental allergies. Neurological:  Negative for dizziness, seizures, syncope, weakness, light-headedness, numbness and headaches. Hematological:  Negative for adenopathy. Psychiatric/Behavioral:  Negative for confusion. The patient is not nervous/anxious. All other systems reviewed and are negative. Current Outpatient Medications on File Prior to Visit   Medication Sig   • ferrous sulfate 325 (65 Fe) mg tablet Take 325 mg by mouth daily with breakfast   • levonorgestrel (MIRENA) 20 MCG/24HR IUD 1 each by Intrauterine route once   • Multiple Vitamin (MULTIVITAMIN) capsule Take 1 capsule by mouth daily       Objective     /70 (BP Location: Right arm, Patient Position: Sitting, Cuff Size: Adult)   Pulse 103   Temp 99.5 °F (37.5 °C) (Tympanic)   Ht 5' 5" (1.651 m)   Wt 107 kg (236 lb)   SpO2 97%   BMI 39.27 kg/m²     Physical Exam  Vitals and nursing note reviewed. Constitutional:       General: She is not in acute distress. Appearance: Normal appearance. She is not ill-appearing. HENT:      Head: Normocephalic. Eyes:      General:         Right eye: Discharge present. Conjunctiva/sclera: Conjunctivae normal.        Comments: Redness, irritation, and swelling of right upper eyelid present consistent with chalazion. Thick, tan discharge was also noted from the patient's eye. No conjunctival redness or other changes were noted. Cardiovascular:      Rate and Rhythm: Normal rate and regular rhythm. Pulses: Normal pulses. Carotid pulses are 2+ on the right side and 2+ on the left side. Radial pulses are 2+ on the right side and 2+ on the left side.         Posterior tibial pulses are 2+ on the right side and 2+ on the left side. Heart sounds: Normal heart sounds. No murmur heard. Pulmonary:      Effort: Pulmonary effort is normal. No respiratory distress. Breath sounds: Normal breath sounds. No decreased breath sounds, wheezing, rhonchi or rales. Abdominal:      General: Abdomen is flat. Bowel sounds are normal. There is no distension. Palpations: Abdomen is soft. Tenderness: There is no abdominal tenderness. There is no guarding. Musculoskeletal:         General: Normal range of motion. Cervical back: Normal range of motion. Right lower leg: No edema. Left lower leg: No edema. Skin:     General: Skin is warm and dry. Capillary Refill: Capillary refill takes less than 2 seconds. Neurological:      General: No focal deficit present. Mental Status: She is alert and oriented to person, place, and time. Psychiatric:         Mood and Affect: Mood normal.         Behavior: Behavior normal.         Thought Content:  Thought content normal.         Judgment: Judgment normal.       EZEQUIEL Adler

## 2023-11-27 NOTE — ASSESSMENT & PLAN NOTE
Erythromycin ointment was ordered to be used on the affected area as directed. Patient was also advised to continue using warm compresses on the affected area 20 minutes on and 20 minutes off throughout the day.

## 2023-11-28 NOTE — PROGRESS NOTES
Bariatric Behavioral Health Evaluation: Revision Pathway Level 2  status post laparoscopic sleeve gastrectomy by Dr. Iona Hankins on 4/2014   Surgeon: Dr. Iona Hankins  Presenting Problem: weight regain   The pt shared she was able to maintain her healthy weight prior to Covid, but then when things shut down her increase in food intake occurred. Is the patient seeking Bariatric Surgery Eval?   YES    After first surgery patient mentally felt (positive)        Realizes Post- Op Requirements? Yes but will learn more through the program.     Pre-morbid level of function and history of present illness: (any kind of medical conditions) none    Support system: best friend and spouse  Living situation: Spouse    Work: [employer]-     Physical Activity: walking dog and yoga 2 x a week. Active on the weekends       Family hx of obesity: no pt reports no one in her family but she has always been obese as a child. The pt grew up with her mother, father and two siblings who she is close with  Traditions-,  rules/routines around food continued to adulthood: no    Current unhealthy eating habits: social snacker, fast eater, ridged during the week with portions but not on the weekends tends to eat the same meals daily. Hydration- diet coke zero with a lot of ice. Does not eat enough protein not a fruit eater but enjoys vegetables. Biggest issues are carb's. Coffee 2-3 cups a day. Soda hardest issue to stop. Mindless eating:no  Stress eating: yes-moderate-crunchy items  Emotional eating: no     Mental Health, Trauma and Substance use Assessment    Psychiatric/Psychological Treatment Diagnosis: Pt is in agreement with Dx in Epic chart of:none  Outpatient Counselor: hx of seeing a therapist in the past.   Patient educated on the benefits of outpatient therapy to develop positive coping skills and habits for success long term, resource list provided.    Psychiatrist: no   Have you had any Mental Health in-pt admissions? no    History of Eating Disorders: no    Family History-Drug or alcohol hx:no  Have you had any Drug and/or Alcohol use and treatment history: social drinker   Have you had any Substance Use Treatment? no    Tobacco/Vaping History: no     Domestic Violence: no    Abuse or Trauma History:     Additional comments/stressors related to family/relationships/peer support: Patient identifies spouse as her support. Patient identifies no current stressors. Risk Assessment    Supports: reported to be intact  Risk of harm to self or others: (SI/HI) none noted during evaluation  No HI/SI        Presence of Audio/Visual Hallucinations: no     Access to weapons: not reported during interview. Observation: This interview only (SW and RD will continue working with the patient throughout the program). Based on the previous information, the client presents the following risk of harm to self or others: low      Physical/Mental Health Status:     Appearance: appropriate  Sociability: average  Affect: appropriate  Mood: calm  Thought Process: coherent  Speech: normal  Content: no impairment  Orientation: person  Yes , place  Yes , and time  Yes   Insight: emotional  good    BARIATRIC SURGERY EDUCATION CHECKLIST    Patient has received the following education related to the bariatric surgery process and understands:    Patients may be required to complete a psychiatric evaluation and receive clearance for surgery from mental health provider. Patients who undergo weight loss surgery are at higher risk of increased mental health concerns and suicide attempts. Patients may be required to complete a full substance abuse evaluation and then complete all treatment recommendations prior to surgery. If diagnosis of abuse/dependence results, patient may be required to remain sober for one (1) year before having bariatric surgery.     Patients on psychiatric medications should check with their provider to discuss psychiatric medications and the changes in absorption. Patient should discuss all time release medications with provider and take all medications as prescribed. The recommendation is that there is no use of any tobacco products, Hookah or vape's for the bariatric post-operation patient. Bariatric surgery patients should not consume alcohol as a post-operative patient as it may increase risk of numerous health conditions including but not limited to alcohol abuse and ulcers. There is a possibility of weight regain if patient does not follow all program guidelines and recommendations. Bariatric surgery patients should exercise thirty (30) to sixty (60) minutes per day to maintain post-surgical weight loss. Research indicates that bariatric patients are more successful when they see a therapist for up to two (2) years post-op. Patients will follow all medical and dietary recommendations provided. Patient will keep all scheduled appointments and follow up with their physician for a minimum of five (5) years. Patient will take all vitamins as recommended. Post-operative vitamins are life-long. There is a goal month set. All requirements should be met by this time. Don't wait to get started! There is a deadline month set. All requirements must be finished by this time and if not, the patient will be halted in the surgery process. The patient can be referred to the medical weight management program or can come back to the surgical program once the unfinished tasks from the previous program are completed. Female patients of childbearing years are informed that pregnancy is not recommended until 12 months post-op. Recommendations: Recommended for surgery  yes           Note :      Patient presented for behavioral health evaluation for the bariatric program. Negative for Mental Health Dx. Hx of therapy. No hx of Psychiatry.   No Drug and/or Alcohol abuse or treatment. Tobacco/Vaping History: No   Patient agrees to remain nicotine free post-surgery. Patient educated regarding the impact of nicotine and alcohol on the post-surgery bariatric patient. Patient has no family history of tobacco and alcohol addiction. Patient will begin making changes with relationship with food through behavior modifications and mindful techniques. Tracking food intake for one week every three months can assist with weight maintenance and self-accountability for post-surgery success.  and Dietitian follow up visits are available for pre and post-surgery support. Bariatric support group is available monthly. Patient verbalized the ability to start making changes and create a healthy relationship around nutrition. Patient meets criteria for surgery at this time and is referred to the bariatric surgeon. There are no significant psychological problems identified at this time that would limit the ability of the patient to understand the procedure and comply with any medical and/or surgical recommendations. Patient does not report having any psychological co-morbidities that may contribute to the patient's history and inability to lose weight nor is the patient diagnosed with an eating disorder. Patient displays willingness to comply with the preoperative and postoperative treatment plans. Goal:1- increase activity level during the week. Next Appointment: 12/14/23 RD  : Travis NASCIMENTO

## 2023-11-29 ENCOUNTER — OFFICE VISIT (OUTPATIENT)
Dept: BARIATRICS | Facility: CLINIC | Age: 58
End: 2023-11-29

## 2023-11-29 VITALS — WEIGHT: 235 LBS | HEIGHT: 66 IN | BODY MASS INDEX: 37.77 KG/M2

## 2023-11-29 DIAGNOSIS — E66.9 OBESITY, CLASS II, BMI 35-39.9: Primary | ICD-10-CM

## 2023-11-29 DIAGNOSIS — Z90.3 POSTGASTRECTOMY MALABSORPTION: ICD-10-CM

## 2023-11-29 DIAGNOSIS — Z01.818 PRE-OPERATIVE CLEARANCE: ICD-10-CM

## 2023-11-29 DIAGNOSIS — K91.2 POSTSURGICAL MALABSORPTION: ICD-10-CM

## 2023-11-29 DIAGNOSIS — Z98.84 BARIATRIC SURGERY STATUS: ICD-10-CM

## 2023-11-29 DIAGNOSIS — Z98.84 S/P LAPAROSCOPIC SLEEVE GASTRECTOMY: ICD-10-CM

## 2023-11-29 DIAGNOSIS — K91.2 POSTGASTRECTOMY MALABSORPTION: ICD-10-CM

## 2023-11-29 PROCEDURE — RECHECK

## 2023-11-29 NOTE — PROGRESS NOTES
Bariatric Nutrition Assessment Note    Preop  6 Month Program- Level 2 revision       Type of surgery  Vertical sleeve gastrectomy  Surgery Date: 4/1/2014  9 1/2 years  post-op  Surgeon: Dr. Ilean Litten  66 y.o.  female  Ht 5' 5.5" (1.664 m)   Wt 107 kg (235 lb)   BMI 38.51 kg/m²     Wt Readings from Last 3 Encounters:   11/29/23 107 kg (235 lb)   11/27/23 107 kg (236 lb)   11/17/23 106 kg (234 lb 8 oz)        [place]- . Lexi Equation:  1657  Estimated calories for weight maintenance:  1988  ( sedentary  )   Estimated calories for weight loss 988-1488 ( 1-2# per wk wt loss - sedentary )  Estimated protein needs  g/d (1.0-1.5 gms/kg IBW )   Estimated fluid needs 69-81 oz/ d (30-35 ml/kg IBW )      Weight on Day of Weight Loss Surgery: 272#  Weight in (lb) to have BMI = 25: 151.9#  [place]o Wt: 160 lbs per pt report  Was able to maintain 180-190 # for years  S/P Covid infection gained weight and has been unable to lose it   Post-Op Wt Loss: 43.4#/ 34% EBWL in 9 1/2  year(s)    Review of History and Medications   Past Medical History:   Diagnosis Date    Abnormal Pap smear of cervix 05/2016 5/2016 LGSIL, + other HPV; 6/2016 Colpo JANA 1. 12/2021 pap neg/ HPV neg. Anemia     DVT (deep venous thrombosis) (HCC)     Factor V Leiden mutation (HCC)     GERD (gastroesophageal reflux disease)     Obesity, Class II, BMI 35-39.9     PONV (postoperative nausea and vomiting)     Pulmonary embolism (HCC)     S/P gastric surgery     gastric sleeve     Sleep apnea     wears c-pap, Last assessed: 3/7/14     Past Surgical History:   Procedure Laterality Date    BARIATRIC SURGERY      CHOLECYSTECTOMY      COLONOSCOPY      complete, resolved: 1996    EGD AND COLONOSCOPY N/A 03/08/2018    Procedure: EGD AND COLONOSCOPY;  Surgeon: Joellen Munguia MD;  Location: [facility];   Service: Gastroenterology    GALLBLADDER SURGERY      IVC FILTER INSERTION  04/01/2014    Radiologic Supervision: [place] Filter Placement in IVC    MOUTH SURGERY      STOMACH SURGERY  04/01/2014    sleeve, Gastric surgery for Morbid Obesity Laparoscopic Longitudinal Gastrectomy, per allscripts    UPPER GASTROINTESTINAL ENDOSCOPY       Social History     Socioeconomic History    Marital status: /Civil Union     Spouse name: Not on file    Number of children: 1    Years of education: Not on file    Highest education level: Not on file   Occupational History    Not on file   Tobacco Use    Smoking status: Never    Smokeless tobacco: Never    Tobacco comments:     current non-smoker, per allscripts   Vaping Use    Vaping Use: Never used   Substance and Sexual Activity    Alcohol use: Yes     Comment: social    Drug use: No    Sexual activity: Yes     Partners: Male     Birth control/protection: I.U.D.    Other Topics Concern    Not on file   Social History Narrative    , per allscripts    Exercise frequency (times/week)     Social Determinants of Health     Financial Resource Strain: Not on file   Food Insecurity: Not on file   Transportation Needs: Not on file   Physical Activity: Not on file   Stress: Not on file   Social Connections: Not on file   Intimate Partner Violence: Not on file   Housing Stability: Not on file       Current Outpatient Medications:     erythromycin (ILOTYCIN) ophthalmic ointment, ADMINISTER 0.5 INCHES TO THE RIGHT EYE EVERY 4 (FOUR) HOURS FOR 7 DAYS, Disp: 42 g, Rfl: 0    ferrous sulfate 325 (65 Fe) mg tablet, Take 325 mg by mouth daily with breakfast, Disp: , Rfl:     levonorgestrel (MIRENA) 20 MCG/24HR IUD, 1 each by Intrauterine route once, Disp: , Rfl:     Multiple Vitamin (MULTIVITAMIN) capsule, Take 1 capsule by mouth daily, Disp: , Rfl:     Food Intake and Lifestyle Assessment   Food Intake Assessment completed via usual diet recall  Breakfast: 9 am / 9:30   Coffee at home- with low sugar creamer 60 calories ( 4 Tbsp )   Egg bites or cottage cheese   Snack: 0   Lunch: 12/1 pm Salad kit pre purchased ~ 300  Does not always have protein in it   Snack: 2 rice cakes with PB   Dinner: 6/6:30 pm   Could be salad with protein   Cheese tortbreei (  travelingg )  Sauteed spinach and onions   Snack: 0- sometimes coke zero   Some snacking  on weekends/ day soff   Triskets with cream cheese ( chive flavored )   Cucumber black olives with vingerette and grape leaves   Out to eat will eat french fries and salad   Beverage intake: water, diet soda, and coffee/tea  Diet texture/stage: regular  Protein supplement: none currently   Estimated protein intake per day: ~40 grams   Estimated fluid intake per day: 68 -70 oz per day   Meals eaten away from home: once per week   Typical meal pattern: 3 meals per day and 2 snacks per day  Eating Behaviors: Appropriate diet advancement, Appropriate portion sizes, Does not drink with meals and waits 30-minutes after meal before resuming drinking, and Mindless eating    Vitamin and Mineral regimen: multivitamin and mineral, plus additional iron supplement   IUD has stopped excessive menstrual flow   Currently not taking calcium or extra vitamin D    Food allergies or intolerances: fried foods cause nausea/ regurgitation   Cultural or Christianity considerations: n/a     Physical Assessment  Nutrition Related Findings  Constipation, Loss of Hunger, and high fat foods causes regurgitation     Physical Activity  Types of exercise: Walking  Yoga  Current physical limitations: joint pain     Psychosocial Assessment   Support systems: spouse  Socioeconomic factors: works full time     Nutrition Diagnosis  Diagnosis: Overweight / Obesity (NC-3.3)  Related to: Limited adherence to nutrition-related recommendations and Physical inactivity  As Evidenced by: BMI >25 and Unintentional weight gain     Nutrition Prescription: Recommend the following diet  1200 -1400 calories/ 75 -85 grams protein   80 oz/ fluid per day     Meal Plan ( Navneet/Pro/Carb)   Breakfast: 200/15/30  Snack: 150/>5/15  Lunch: 300/25/30  Snack: 150/>5/15  Dinner: 300/25/30  Snack: 150/>5/15      Interventions and Teaching   Patient educated on post-op nutrition guidelines. Patient educated and handouts provided. Surgical changes to stomach / GI  Capacity of post-surgery stomach  Diet progression  Adequate hydration  Sugar and fat restriction to decrease "dumping syndrome"  Fat restriction to decrease steatorrhea  Expected weight loss  Weight loss plateaus/ possibility of weight regain  Exercise  Suggestions for pre-op diet  Nutrition considerations after surgery  Protein supplements  Meal planning and preparation  Appropriate carbohydrate, protein, and fat intake, and food/fluid choices to maximize safe weight loss, nutrient intake, and tolerance   Dietary and lifestyle changes  Possible problems with poor eating habits  Intuitive eating  Techniques for self monitoring and keeping daily food journal  Potential for food intolerance after surgery, and ways to deal with them including: lactose intolerance, nausea, reflux, vomiting, diarrhea, food intolerance, appetite changes, gas  Vitamin / Mineral supplementation of Multivitamin with minerals, Calcium, Vitamin B12, Iron, Fat Soluble vitamins, and Vitamin D  Patient is not currently pregnant and doesn't desire to become pregnant a minimum of one year post-op- pt is menopausal, has IUD to control monthly bleeding. Reviewed non animal based protein sources and lacto ovo protein sources to increase protein in diet     Education provided to: patient    Barriers to learning: No barriers identified    Readiness to change: preparation    Comprehension: needs reinforcement and verbalizes understanding     Expected Compliance: good    Recommendations  Pt is an appropriate candidate for surgery.  Yes      Evaluation/Monitoring   Eating pattern as discussed Tolerance of nutrition prescription Body weight Lab values Physical activity Bowel pattern    Goals  Eliminate sugar sweetened beverages, Food journal, Eat 3 meals per day, Eliminate mindless snacking, and include protein at each meal   Food log MFP 1200 calories per day   Include protein at each meal to increase satiety   Decrease coffee intake to 16 ounces per day   Limit and eventually stop all grazing on crackers/cream cheese   Get post op blood work done to assess if any deficiencies  Start taking 2000 IU of vitamin D and 1200  mg of calcium in addition to current vitamin and mineral regimen      Time Spent:   1 Hour

## 2023-12-08 LAB
ALBUMIN SERPL-MCNC: 4.5 G/DL (ref 3.6–5.1)
ALBUMIN/GLOB SERPL: 1.6 (CALC) (ref 1–2.5)
ALP SERPL-CCNC: 86 U/L (ref 37–153)
ALT SERPL-CCNC: 17 U/L (ref 6–29)
AST SERPL-CCNC: 13 U/L (ref 10–35)
BILIRUB SERPL-MCNC: 0.7 MG/DL (ref 0.2–1.2)
BUN SERPL-MCNC: 18 MG/DL (ref 7–25)
BUN/CREAT SERPL: ABNORMAL (CALC) (ref 6–22)
CALCIUM SERPL-MCNC: 9.8 MG/DL (ref 8.6–10.4)
CHLORIDE SERPL-SCNC: 105 MMOL/L (ref 98–110)
CHOLEST SERPL-MCNC: 241 MG/DL
CHOLEST/HDLC SERPL: 4.5 (CALC)
CO2 SERPL-SCNC: 27 MMOL/L (ref 20–32)
CREAT SERPL-MCNC: 0.7 MG/DL (ref 0.5–1.03)
GFR/BSA.PRED SERPLBLD CYS-BASED-ARV: 101 ML/MIN/1.73M2
GLOBULIN SER CALC-MCNC: 2.9 G/DL (CALC) (ref 1.9–3.7)
GLUCOSE SERPL-MCNC: 101 MG/DL (ref 65–99)
HDLC SERPL-MCNC: 53 MG/DL
LDLC SERPL CALC-MCNC: 155 MG/DL (CALC)
NONHDLC SERPL-MCNC: 188 MG/DL (CALC)
POTASSIUM SERPL-SCNC: 4.4 MMOL/L (ref 3.5–5.3)
PROT SERPL-MCNC: 7.4 G/DL (ref 6.1–8.1)
SODIUM SERPL-SCNC: 139 MMOL/L (ref 135–146)
TRIGL SERPL-MCNC: 192 MG/DL

## 2023-12-09 LAB
BASOPHILS # BLD AUTO: 32 CELLS/UL (ref 0–200)
BASOPHILS NFR BLD AUTO: 0.5 %
EOSINOPHIL # BLD AUTO: 120 CELLS/UL (ref 15–500)
EOSINOPHIL NFR BLD AUTO: 1.9 %
ERYTHROCYTE [DISTWIDTH] IN BLOOD BY AUTOMATED COUNT: 12.1 % (ref 11–15)
FERRITIN SERPL-MCNC: 149 NG/ML (ref 16–232)
HCT VFR BLD AUTO: 44.2 % (ref 35–45)
HGB BLD-MCNC: 15.2 G/DL (ref 11.7–15.5)
LYMPHOCYTES # BLD AUTO: 1896 CELLS/UL (ref 850–3900)
LYMPHOCYTES NFR BLD AUTO: 30.1 %
MCH RBC QN AUTO: 29.8 PG (ref 27–33)
MCHC RBC AUTO-ENTMCNC: 34.4 G/DL (ref 32–36)
MCV RBC AUTO: 86.7 FL (ref 80–100)
MONOCYTES # BLD AUTO: 473 CELLS/UL (ref 200–950)
MONOCYTES NFR BLD AUTO: 7.5 %
NEUTROPHILS # BLD AUTO: 3780 CELLS/UL (ref 1500–7800)
NEUTROPHILS NFR BLD AUTO: 60 %
PLATELET # BLD AUTO: 239 THOUSAND/UL (ref 140–400)
PMV BLD REES-ECKER: 11.4 FL (ref 7.5–12.5)
RBC # BLD AUTO: 5.1 MILLION/UL (ref 3.8–5.1)
VIT B12 SERPL-MCNC: 452 PG/ML (ref 200–1100)
WBC # BLD AUTO: 6.3 THOUSAND/UL (ref 3.8–10.8)

## 2023-12-13 LAB
25(OH)D3 SERPL-MCNC: 22 NG/ML (ref 30–100)
CALCIUM SERPL-MCNC: 9.7 MG/DL (ref 8.6–10.4)
PTH-INTACT SERPL-MCNC: 53 PG/ML (ref 16–77)
VIT A SERPL-MCNC: 63 MCG/DL (ref 38–98)
VIT B1 BLD-SCNC: 150 NMOL/L (ref 78–185)
ZINC SERPL-MCNC: 89 MCG/DL (ref 60–130)

## 2023-12-13 NOTE — PROGRESS NOTES
Bariatric Nutrition Assessment Note    Preop  6 Month Program- Level 2 revision   1/6 Today     Type of surgery  Vertical sleeve gastrectomy  Surgery Date: 4/1/2014  9 1/2 years  post-op  Surgeon: Dr. Kayla Harden  66 y.o.  female  Wt 107 kg (235 lb)   BMI 38.51 kg/m²    Pt continues to maintain her weight     Wt Readings from Last 3 Encounters:   11/29/23 107 kg (235 lb)   11/27/23 107 kg (236 lb)   11/17/23 106 kg (234 lb 8 oz)        [place]- St. Jeor Equation:  1657  Estimated calories for weight maintenance:  1988  ( sedentary  )   Estimated calories for weight loss 988-1488 ( 1-2# per wk wt loss - sedentary )  Estimated protein needs  g/d (1.0-1.5 gms/kg IBW )   Estimated fluid needs 69-81 oz/ d (30-35 ml/kg IBW )      Weight on Day of Weight Loss Surgery: 272#  Weight in (lb) to have BMI = 25: 151.9#  [place] Wt: 160 lbs per pt report  Was able to maintain 180-190 # for years  S/P Covid infection gained weight and has been unable to lose it   Post-Op Wt Loss: 43.4#/ 34% EBWL in 9 1/2  year(s)    Review of History and Medications   Past Medical History:   Diagnosis Date    Abnormal Pap smear of cervix 05/2016 5/2016 LGSIL, + other HPV; 6/2016 Colpo JANA 1. 12/2021 pap neg/ HPV neg. Anemia     DVT (deep venous thrombosis) (HCC)     Factor V Leiden mutation (HCC)     GERD (gastroesophageal reflux disease)     Obesity, Class II, BMI 35-39.9     PONV (postoperative nausea and vomiting)     Pulmonary embolism (HCC)     S/P gastric surgery     gastric sleeve     Sleep apnea     wears c-pap, Last assessed: 3/7/14     Past Surgical History:   Procedure Laterality Date    BARIATRIC SURGERY      CHOLECYSTECTOMY      COLONOSCOPY      complete, resolved: 1996    EGD AND COLONOSCOPY N/A 03/08/2018    Procedure: EGD AND COLONOSCOPY;  Surgeon: Gene Manzo MD;  Location: [facility];   Service: Gastroenterology    GALLBLADDER SURGERY      IVC FILTER INSERTION 04/01/2014    Radiologic Supervision: [place] Filter Placement in IVC    MOUTH SURGERY      STOMACH SURGERY  04/01/2014    sleeve, Gastric surgery for Morbid Obesity Laparoscopic Longitudinal Gastrectomy, per allscripts    UPPER GASTROINTESTINAL ENDOSCOPY       Social History     Socioeconomic History    Marital status: /Civil Union     Spouse name: Not on file    Number of children: 1    Years of education: Not on file    Highest education level: Not on file   Occupational History    Not on file   Tobacco Use    Smoking status: Never    Smokeless tobacco: Never    Tobacco comments:     current non-smoker, per allscripts   Vaping Use    Vaping status: Never Used   Substance and Sexual Activity    Alcohol use: Yes     Comment: social    Drug use: No    Sexual activity: Yes     Partners: Male     Birth control/protection: I.U.D.    Other Topics Concern    Not on file   Social History Narrative    , per allscripts    Exercise frequency (times/week)     Social Determinants of Health     Financial Resource Strain: Not on file   Food Insecurity: Not on file   Transportation Needs: Not on file   Physical Activity: Not on file   Stress: Not on file   Social Connections: Not on file   Intimate Partner Violence: Not on file   Housing Stability: Not on file       Current Outpatient Medications:     ferrous sulfate 325 (65 Fe) mg tablet, Take 325 mg by mouth daily with breakfast, Disp: , Rfl:     levonorgestrel (MIRENA) 20 MCG/24HR IUD, 1 each by Intrauterine route once, Disp: , Rfl:     Multiple Vitamin (MULTIVITAMIN) capsule, Take 1 capsule by mouth daily, Disp: , Rfl:     Food Intake and Lifestyle Assessment   Food Intake Assessment completed via usual diet recall  Breakfast: 9 am / 9:30   Coffee at home- with low sugar creamer 60 calories ( 4 Tbsp )   Egg bites or cottage cheese   Snack: 0   Lunch: 12/1 pm Salad kit pre purchased ~ 300  Does not always have protein in it   Snack: 2 rice cakes with PB Dinner: 6/6:30 pm   Could be salad with protein   Cheese tortbreei (  travelingg )  Sauteed spinach and onions   Snack: 0- sometimes coke zero   Some snacking  on weekends/ day soff   Triskets with cream cheese ( chive flavored )   Cucumber black olives with vingerette and grape leaves   Out to eat will eat french fries and salad   Beverage intake: water, diet soda, and coffee/tea  Diet texture/stage: regular  Protein supplement: none currently   Estimated protein intake per day: ~40 grams   Estimated fluid intake per day: 68 -70 oz per day   Meals eaten away from home: once per week   Typical meal pattern: 3 meals per day and 2 snacks per day  Eating Behaviors: Appropriate diet advancement, Appropriate portion sizes, Does not drink with meals and waits 30-minutes after meal before resuming drinking, and Mindless eating    Vitamin and Mineral regimen: multivitamin and mineral, plus additional iron supplement   IUD has stopped excessive menstrual flow   Currently not taking calcium or extra vitamin D    Food allergies or intolerances: fried foods cause nausea/ regurgitation   Cultural or Mu-ism considerations: n/a     Physical Assessment  Nutrition Related Findings  Constipation, Loss of Hunger, and high fat foods causes regurgitation     Physical Activity  Types of exercise: Walking  Yoga  Current physical limitations: joint pain     Psychosocial Assessment   Support systems: spouse  Socioeconomic factors: works full time     Nutrition Diagnosis  Diagnosis: Overweight / Obesity (NC-3.3)  Related to: Limited adherence to nutrition-related recommendations and Physical inactivity  As Evidenced by: BMI >25 and Unintentional weight gain     Nutrition Prescription: Recommend the following diet  1200 -1400 calories/ 75 -85 grams protein   80 oz/ fluid per day     Meal Plan ( Navneet/Pro/Carb)   Breakfast: 200/15/30  Snack: 150/>5/15  Lunch: 300/25/30  Snack: 150/>5/15  Dinner: 300/25/30  Snack: 150/>5/15      Interventions and Teaching   Patient educated on post-op nutrition guidelines. Patient educated and handouts provided. Surgical changes to stomach / GI  Capacity of post-surgery stomach  Diet progression  Adequate hydration  Sugar and fat restriction to decrease "dumping syndrome"  Fat restriction to decrease steatorrhea  Expected weight loss  Weight loss plateaus/ possibility of weight regain  Exercise  Suggestions for pre-op diet  Nutrition considerations after surgery  Protein supplements  Meal planning and preparation  Appropriate carbohydrate, protein, and fat intake, and food/fluid choices to maximize safe weight loss, nutrient intake, and tolerance   Dietary and lifestyle changes  Possible problems with poor eating habits  Intuitive eating  Techniques for self monitoring and keeping daily food journal  Potential for food intolerance after surgery, and ways to deal with them including: lactose intolerance, nausea, reflux, vomiting, diarrhea, food intolerance, appetite changes, gas  Vitamin / Mineral supplementation of Multivitamin with minerals, Calcium, Vitamin B12, Iron, Fat Soluble vitamins, and Vitamin D  Patient is not currently pregnant and doesn't desire to become pregnant a minimum of one year post-op- pt is menopausal, has IUD to control monthly bleeding. Reviewed non animal based protein sources and lacto ovo protein sources to increase protein in diet     Education provided to: patient    Barriers to learning: No barriers identified    Readiness to change: preparation    Comprehension: needs reinforcement and verbalizes understanding     Expected Compliance: good    Recommendations  Pt is an appropriate candidate for surgery. Yes      Evaluation/Monitoring   Eating pattern as discussed Tolerance of nutrition prescription Body weight Lab values Physical activity Bowel pattern  Pt has returned to food logging on [facility] and finds it helpful.   She has started decreasing and measuring her portions. She has been mindful on her food choices at holiday gatherings. Continues to struggle with increasing her protein intake. Discussed plant based options for increasing protein intake as she does not like red meat. She has eliminated grazing on crackers and is having a planned snack. She is taking her addiitional calcium as recommended.      Goals- continued   Eliminate sugar sweetened beverages, Food journal, Eat 3 meals per day, Eliminate mindless snacking, and include protein at each meal   Food log MFP 1200 -1400 calories per day   Include protein at each meal to increase satiety   Decrease coffee intake to 16 ounces per day      Time Spent:   30 minutes

## 2023-12-14 ENCOUNTER — OFFICE VISIT (OUTPATIENT)
Dept: BARIATRICS | Facility: CLINIC | Age: 58
End: 2023-12-14

## 2023-12-14 VITALS — WEIGHT: 235 LBS | BODY MASS INDEX: 38.51 KG/M2

## 2023-12-14 DIAGNOSIS — Z98.84 BARIATRIC SURGERY STATUS: Primary | ICD-10-CM

## 2023-12-14 PROCEDURE — RECHECK: Performed by: DIETITIAN, REGISTERED

## 2023-12-15 ENCOUNTER — OFFICE VISIT (OUTPATIENT)
Dept: OBGYN CLINIC | Facility: CLINIC | Age: 58
End: 2023-12-15

## 2023-12-15 VITALS
SYSTOLIC BLOOD PRESSURE: 120 MMHG | DIASTOLIC BLOOD PRESSURE: 80 MMHG | WEIGHT: 240 LBS | BODY MASS INDEX: 39.99 KG/M2 | HEIGHT: 65 IN

## 2023-12-15 DIAGNOSIS — M17.11 PRIMARY OSTEOARTHRITIS OF RIGHT KNEE: Primary | ICD-10-CM

## 2023-12-15 RX ORDER — BUPIVACAINE HYDROCHLORIDE 2.5 MG/ML
2 INJECTION, SOLUTION INFILTRATION; PERINEURAL
Status: COMPLETED | OUTPATIENT
Start: 2023-12-15 | End: 2023-12-15

## 2023-12-15 RX ORDER — METHYLPREDNISOLONE ACETATE 40 MG/ML
1 INJECTION, SUSPENSION INTRA-ARTICULAR; INTRALESIONAL; INTRAMUSCULAR; SOFT TISSUE
Status: COMPLETED | OUTPATIENT
Start: 2023-12-15 | End: 2023-12-15

## 2023-12-15 RX ORDER — LIDOCAINE HYDROCHLORIDE 10 MG/ML
2 INJECTION, SOLUTION INFILTRATION; PERINEURAL
Status: COMPLETED | OUTPATIENT
Start: 2023-12-15 | End: 2023-12-15

## 2023-12-15 RX ORDER — MELOXICAM 15 MG/1
15 TABLET ORAL DAILY
Qty: 30 TABLET | Refills: 0 | Status: SHIPPED | OUTPATIENT
Start: 2023-12-15

## 2023-12-15 RX ADMIN — BUPIVACAINE HYDROCHLORIDE 2 ML: 2.5 INJECTION, SOLUTION INFILTRATION; PERINEURAL at 10:00

## 2023-12-15 RX ADMIN — METHYLPREDNISOLONE ACETATE 1 ML: 40 INJECTION, SUSPENSION INTRA-ARTICULAR; INTRALESIONAL; INTRAMUSCULAR; SOFT TISSUE at 10:00

## 2023-12-15 RX ADMIN — LIDOCAINE HYDROCHLORIDE 2 ML: 10 INJECTION, SOLUTION INFILTRATION; PERINEURAL at 10:00

## 2023-12-15 NOTE — PROGRESS NOTES
Patient Name:  Zoë Orr  MRN:  [id number]    Assessment & Plan     Right knee DJD. Corticosteroid injection performed today into the right knee. Meloxicam prescription provided today. Activities as tolerated. Modification as needed for pain. Offered referral to physical therapy. Patient declined at this time. Follow-up in 3 months. Consider repeat corticosteroid injection at that time as indicated. Also discussed hyaluronic acid injections as well. Chief Complaint     Right knee pain    History of the Present Illness     Arben Nugent is a 66 y.o. female who reports to the office today for evaluation of her right knee. She notes an onset of pain approximately 3 months ago. She denies any specific injury or trauma at that time. She notes generalized right knee pain worse with prolonged standing and walking. She notes associated pain and stiffness first thing in the morning as well as when transitioning from a seated to standing position. She denies any significant weakness or instability. She currently takes Aleve with some improvement. No numbness or tingling. No fevers or chills. Denies any radiation of the pain distally. Physical Exam     /80   Ht 5' 5" (1.651 m)   Wt 109 kg (240 lb)   BMI 39.94 kg/m²     Right knee: No gross deformity. Skin intact. No erythema ecchymosis or swelling. No effusion. No significant joint line tenderness. Range of motion is 0 to 110 degrees with discomfort and crepitation appreciated. Stable to varus and valgus stress without pain. Stable Lachman test.  Negative posterior drawer test.  Negative Lionel's test.  Extensor mechanism is intact. Eyes: Anicteric sclerae. ENT: Trachea midline. Lungs: Normal respiratory effort. CV: Capillary refill is less than 2 seconds. Skin: Intact without erythema. Lymph: No palpable lymphadenopathy. Neuro: Sensation is grossly intact to light touch. Psych: Mood and affect are appropriate.     Data Review     I have personally reviewed pertinent films in PACS, and my interpretation follows:    X-rays right knee 12/15/2023: No acute osseous abnormality. No fracture or dislocation. Tricompartment degenerative changes moderate to severe in the medial and patellofemoral compartments    Past Medical History:   Diagnosis Date    Abnormal Pap smear of cervix 05/2016 5/2016 LGSIL, + other HPV; 6/2016 Colpo JANA 1. 12/2021 pap neg/ HPV neg. Anemia     DVT (deep venous thrombosis) ([facility])     Factor V Leiden mutation (HCC)     GERD (gastroesophageal reflux disease)     Obesity, Class II, BMI 35-39.9     PONV (postoperative nausea and vomiting)     Pulmonary embolism ([facility])     S/P gastric surgery     gastric sleeve     Sleep apnea     wears c-pap, Last assessed: 3/7/14       Past Surgical History:   Procedure Laterality Date    BARIATRIC SURGERY      CHOLECYSTECTOMY      COLONOSCOPY      complete, resolved: 1996    EGD AND COLONOSCOPY N/A 03/08/2018    Procedure: EGD AND COLONOSCOPY;  Surgeon: Shamir Vanegas MD;  Location: [facility]; Service: Gastroenterology    GALLBLADDER SURGERY      IVC FILTER INSERTION  04/01/2014    Radiologic Supervision: [place] Filter Placement in IVC    MOUTH SURGERY      STOMACH SURGERY  04/01/2014    sleeve, Gastric surgery for Morbid Obesity Laparoscopic Longitudinal Gastrectomy, per allscripts    UPPER GASTROINTESTINAL ENDOSCOPY         Allergies   Allergen Reactions    Pseudoephedrine Tachycardia    Latex Rash       Current Outpatient Medications on File Prior to Visit   Medication Sig Dispense Refill    ferrous sulfate 325 (65 Fe) mg tablet Take 325 mg by mouth daily with breakfast      levonorgestrel (MIRENA) 20 MCG/24HR IUD 1 each by Intrauterine route once      Multiple Vitamin (MULTIVITAMIN) capsule Take 1 capsule by mouth daily       No current facility-administered medications on file prior to visit.        Social History     Tobacco Use    Smoking status: Never Smokeless tobacco: Never    Tobacco comments:     current non-smoker, per allscripts   Vaping Use    Vaping status: Never Used   Substance Use Topics    Alcohol use: Yes     Comment: social    Drug use: No       Family History   Problem Relation Age of Onset    Osteoporosis Mother     Diabetes Father     Heart disease Father     Arthritis Father     Heart failure Father     Hypertension Father     Ovarian cancer Maternal Grandmother 80    No Known Problems Maternal Grandfather     No Known Problems Paternal Grandmother     No Known Problems Paternal Grandfather     No Known Problems Paternal Aunt     No Known Problems Paternal Aunt     No Known Problems Paternal Aunt     No Known Problems Paternal Aunt     Colon cancer Neg Hx     Breast cancer Neg Hx     Uterine cancer Neg Hx        Review of Systems     As stated in the HPI. All other systems reviewed and are negative.       Large joint arthrocentesis: R knee  Procedure Details  Location: knee - R knee  Needle size: 22 G  Ultrasound guidance: no  Approach: anterolateral  Medications administered: 2 mL bupivacaine 0.25 %; 2 mL lidocaine 1 %; 1 mL methylPREDNISolone acetate 40 mg/mL    Patient tolerance: patient tolerated the procedure well with no immediate complications  Dressing:  Sterile dressing applied

## 2024-01-04 ENCOUNTER — OFFICE VISIT (OUTPATIENT)
Dept: FAMILY MEDICINE CLINIC | Facility: CLINIC | Age: 59
End: 2024-01-04
Payer: COMMERCIAL

## 2024-01-04 VITALS
SYSTOLIC BLOOD PRESSURE: 118 MMHG | TEMPERATURE: 98.5 F | HEIGHT: 65 IN | OXYGEN SATURATION: 98 % | BODY MASS INDEX: 38.82 KG/M2 | DIASTOLIC BLOOD PRESSURE: 70 MMHG | HEART RATE: 71 BPM | WEIGHT: 233 LBS

## 2024-01-04 DIAGNOSIS — J20.9 ACUTE BRONCHITIS, UNSPECIFIED ORGANISM: ICD-10-CM

## 2024-01-04 DIAGNOSIS — D68.51 FACTOR V LEIDEN (HCC): ICD-10-CM

## 2024-01-04 DIAGNOSIS — Z12.31 ENCOUNTER FOR SCREENING MAMMOGRAM FOR MALIGNANT NEOPLASM OF BREAST: ICD-10-CM

## 2024-01-04 DIAGNOSIS — L24.9 IRRITANT CONTACT DERMATITIS, UNSPECIFIED TRIGGER: Primary | ICD-10-CM

## 2024-01-04 PROCEDURE — 99214 OFFICE O/P EST MOD 30 MIN: CPT | Performed by: NURSE PRACTITIONER

## 2024-01-04 RX ORDER — BETAMETHASONE DIPROPIONATE 0.5 MG/G
CREAM TOPICAL 2 TIMES DAILY
Qty: 50 G | Refills: 0 | Status: SHIPPED | OUTPATIENT
Start: 2024-01-04

## 2024-01-04 RX ORDER — AMOXICILLIN AND CLAVULANATE POTASSIUM 875; 125 MG/1; MG/1
1 TABLET, FILM COATED ORAL EVERY 12 HOURS SCHEDULED
Qty: 14 TABLET | Refills: 0 | Status: SHIPPED | OUTPATIENT
Start: 2024-01-04 | End: 2024-01-11

## 2024-01-04 RX ORDER — FLUCONAZOLE 150 MG/1
150 TABLET ORAL ONCE
Qty: 1 TABLET | Refills: 0 | Status: SHIPPED | OUTPATIENT
Start: 2024-01-04 | End: 2024-01-04

## 2024-01-04 RX ORDER — METHYLPREDNISOLONE 4 MG/1
TABLET ORAL
Qty: 21 EACH | Refills: 0 | Status: SHIPPED | OUTPATIENT
Start: 2024-01-04

## 2024-01-04 NOTE — PROGRESS NOTES
Name: Estefania Avila      : 1965      MRN: [id number]  Encounter Provider: EZEQUIEL Peters  Encounter Date: 2024   Encounter department: [facility]    Assessment & Plan     1. Irritant contact dermatitis, unspecified trigger  Comments:  start betamethsone bid for 2 weeks mpoisturizer recommended.  Orders:  -     betamethasone, augmented, (DIPROLENE-AF) 0.05 % cream; Apply topically 2 (two) times a day    2. Acute bronchitis, unspecified organism  Comments:  congestion in the lungs. start augmentin and medrol continue nyquil  Orders:  -     amoxicillin-clavulanate (AUGMENTIN) 875-125 mg per tablet; Take 1 tablet by mouth every 12 (twelve) hours for 7 days  -     methylPREDNISolone 4 MG tablet therapy pack; Use as directed on package  -     fluconazole (DIFLUCAN) 150 mg tablet; Take 1 tablet (150 mg total) by mouth once for 1 dose    3. Factor V Leiden (HCC)  Assessment & Plan:  Hematology recommended no longer need for anticoagulation       4. Encounter for screening mammogram for malignant neoplasm of breast  -     Mammo screening bilateral w 3d & cad; Future; Expected date: 2024           Subjective      Saturday started with sore throat and then worsened on new years day. She tested negative for covid. She took nyquil every night and this has been helpful.  Her  had the flu 3 weeks ago but he isolated.   Using vicks at night.   Chest feels tight- and having shortness of breath   Having eye pasted shut in the morning.       Review of Systems   Constitutional:  Positive for fatigue. Negative for fever.   HENT:  Positive for congestion, ear pain and sore throat.    Respiratory:  Positive for cough, chest tightness and shortness of breath.    Gastrointestinal:  Positive for diarrhea. Negative for abdominal pain, nausea and vomiting.   Neurological:  Negative for dizziness and headaches.       Current Outpatient Medications on File Prior to Visit   Medication Sig   •  "ferrous sulfate 325 (65 Fe) mg tablet Take 325 mg by mouth daily with breakfast   • levonorgestrel (MIRENA) 20 MCG/24HR IUD 1 each by Intrauterine route once   • meloxicam (Mobic) 15 mg tablet Take 1 tablet (15 mg total) by mouth daily   • Multiple Vitamin (MULTIVITAMIN) capsule Take 1 capsule by mouth daily       Objective     /70 (BP Location: Left arm, Patient Position: Sitting, Cuff Size: Adult)   Pulse 71   Temp 98.5 °F (36.9 °C) (Tympanic)   Ht 5' 5\" (1.651 m)   Wt 106 kg (233 lb)   SpO2 98%   BMI 38.77 kg/m²     Physical Exam  Vitals and nursing note reviewed.   Constitutional:       Appearance: Normal appearance. She is well-developed.   HENT:      Head: Normocephalic and atraumatic.      Right Ear: Tympanic membrane normal.      Left Ear: Tympanic membrane normal.      Nose: Nose normal.      Mouth/Throat:      Lips: Pink.      Mouth: Mucous membranes are moist.   Eyes:      Extraocular Movements: Extraocular movements intact.      Conjunctiva/sclera: Conjunctivae normal.      Pupils: Pupils are equal, round, and reactive to light.   Cardiovascular:      Rate and Rhythm: Normal rate and regular rhythm.      Heart sounds: Normal heart sounds, S1 normal and S2 normal.   Pulmonary:      Effort: Pulmonary effort is normal.      Breath sounds: Examination of the left-upper field reveals rhonchi. Examination of the left-lower field reveals rhonchi. Rhonchi present. No wheezing.   Neurological:      Mental Status: She is alert and oriented to person, place, and time.   Psychiatric:         Mood and Affect: Mood normal.         Behavior: Behavior normal.         Thought Content: Thought content normal.         Judgment: Judgment normal.         EZEQUIEL Peters    "

## 2024-01-09 ENCOUNTER — OFFICE VISIT (OUTPATIENT)
Dept: HEMATOLOGY ONCOLOGY | Facility: CLINIC | Age: 59
End: 2024-01-09
Payer: COMMERCIAL

## 2024-01-09 VITALS
WEIGHT: 230 LBS | HEIGHT: 65 IN | RESPIRATION RATE: 16 BRPM | SYSTOLIC BLOOD PRESSURE: 132 MMHG | HEART RATE: 92 BPM | OXYGEN SATURATION: 98 % | DIASTOLIC BLOOD PRESSURE: 70 MMHG | TEMPERATURE: 98 F | BODY MASS INDEX: 38.32 KG/M2

## 2024-01-09 DIAGNOSIS — Z90.3 POSTGASTRECTOMY MALABSORPTION: Primary | ICD-10-CM

## 2024-01-09 DIAGNOSIS — E61.1 IRON DEFICIENCY: ICD-10-CM

## 2024-01-09 DIAGNOSIS — K91.2 POSTGASTRECTOMY MALABSORPTION: Primary | ICD-10-CM

## 2024-01-09 PROCEDURE — 99214 OFFICE O/P EST MOD 30 MIN: CPT | Performed by: PHYSICIAN ASSISTANT

## 2024-01-09 NOTE — PROGRESS NOTES
Hematology/Oncology Outpatient Follow-up  Estefania Avila 58 y.o. female 1965 [id number]    Date:  1/9/2024      Assessment and Plan:  1. Postgastrectomy malabsorption, 2. Iron deficiency  58-year-old female presents for follow-up visit regarding history of iron deficiency in the presence of postgastrectomy malabsorption.    She also has history of factor V Leiden.  She is not on any anticoagulation at this time.  She is contemplating converting her sleeve gastrectomy to Indigo-en-Y.  Reviewed.  Recommend Lovenox 40 mg subcu x 4 weeks after surgery.    She had Venofer 200 mg weekly x 6, completing almost 1 year ago.  Ferritin remains excellent at 145.  She notes significant improvement in her overall energy.  She did not realize how fatigued she was until she felt so much better after infusions.  Previously ferritin was in single digits so this does make sense.  Hemoglobin is excellent.  Maintenance iron is not indicated at this time due to excellent iron stores and hemoglobin.    Recommend completing labs at 6 months in 1 year and follow-up in 1 year.    - CBC and differential; Standing  - Ferritin; Standing  - CBC and differential  - Ferritin    HPI:  58-year-old female presents for follow-up regarding history of thrombosis.     She was seen in consult in Dec 2021.      12 years ago she was placed on OCP.  She within 2 weeks had a PE and DVT.  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  She states her mother is also heterozygous.  Her father does not carry it.  Her son has been tested and he is not a carrier.     Due to this she was maintained on Coumadin for a long time.      She has mirena IUD, placed in 2022.      8 years ago she had gastric sleeve bariatric surgery. No clots after surgery.      She had no clots during pregnancy.  She did have multiple miscarriages prior to her term pregnancy however.    Interval history:    ROS: Review of Systems   Constitutional:  Negative for activity change,  appetite change, chills, fatigue, fever and unexpected weight change.   HENT:  Negative for mouth sores and nosebleeds.    Respiratory:  Negative for cough and shortness of breath.    Cardiovascular:  Negative for chest pain, palpitations and leg swelling.   Gastrointestinal:  Negative for abdominal pain, constipation, diarrhea, nausea and vomiting.   Genitourinary:  Negative for difficulty urinating, dysuria and hematuria.   Musculoskeletal:  Negative for arthralgias.   Skin: Negative.    Neurological:  Negative for dizziness, weakness, light-headedness, numbness and headaches.   Hematological: Negative.    Psychiatric/Behavioral: Negative.       Past Medical History:   Diagnosis Date    Abnormal Pap smear of cervix 05/2016 5/2016 LGSIL, + other HPV; 6/2016 Colpo JANA 1. 12/2021 pap neg/ HPV neg.    Anemia     DVT (deep venous thrombosis) ([facility])     Factor V Leiden mutation (HCC)     GERD (gastroesophageal reflux disease)     Obesity, Class II, BMI 35-39.9     PONV (postoperative nausea and vomiting)     Pulmonary embolism (HCC)     S/P gastric surgery     gastric sleeve     Sleep apnea     wears c-pap, Last assessed: 3/7/14       Past Surgical History:   Procedure Laterality Date    BARIATRIC SURGERY      CHOLECYSTECTOMY      COLONOSCOPY      complete, resolved: 1996    EGD AND COLONOSCOPY N/A 03/08/2018    Procedure: EGD AND COLONOSCOPY;  Surgeon: Joy Elias MD;  Location: [facility];  Service: Gastroenterology    GALLBLADDER SURGERY      IVC FILTER INSERTION  04/01/2014    Radiologic Supervision: Felicitas Filter Placement in IVC    MOUTH SURGERY      STOMACH SURGERY  04/01/2014    sleeve, Gastric surgery for Morbid Obesity Laparoscopic Longitudinal Gastrectomy, per allscripts    UPPER GASTROINTESTINAL ENDOSCOPY         Social History     Socioeconomic History    Marital status: /Civil Union     Spouse name: None    Number of children: 1    Years of education: None    Highest education level:  None   Occupational History    None   Tobacco Use    Smoking status: Never     Passive exposure: Never    Smokeless tobacco: Never    Tobacco comments:     current non-smoker, per allscripts   Vaping Use    Vaping status: Never Used   Substance and Sexual Activity    Alcohol use: Yes     Alcohol/week: 2.0 standard drinks of alcohol     Types: 2 Standard drinks or equivalent per week     Comment: Socially    Drug use: No    Sexual activity: Yes     Partners: Male     Birth control/protection: I.U.D.   Other Topics Concern    None   Social History Narrative    , per allscripts    Exercise frequency (times/week)     Social Determinants of Health     Financial Resource Strain: Not on file   Food Insecurity: Not on file   Transportation Needs: Not on file   Physical Activity: Not on file   Stress: Not on file   Social Connections: Not on file   Intimate Partner Violence: Not on file   Housing Stability: Not on file       Family History   Problem Relation Age of Onset    Osteoporosis Mother     Diabetes Father     Heart disease Father     Arthritis Father     Heart failure Father     Hypertension Father     Ovarian cancer Maternal Grandmother 82    No Known Problems Maternal Grandfather     No Known Problems Paternal Grandmother     No Known Problems Paternal Grandfather     No Known Problems Paternal Aunt     No Known Problems Paternal Aunt     No Known Problems Paternal Aunt     No Known Problems Paternal Aunt     Colon cancer Neg Hx     Breast cancer Neg Hx     Uterine cancer Neg Hx        Allergies   Allergen Reactions    Pseudoephedrine Tachycardia    Latex Rash         Current Outpatient Medications:     amoxicillin-clavulanate (AUGMENTIN) 875-125 mg per tablet, Take 1 tablet by mouth every 12 (twelve) hours for 7 days, Disp: 14 tablet, Rfl: 0    betamethasone, augmented, (DIPROLENE-AF) 0.05 % cream, Apply topically 2 (two) times a day, Disp: 50 g, Rfl: 0    ferrous sulfate 325 (65 Fe) mg tablet, Take 325 mg  "by mouth daily with breakfast, Disp: , Rfl:     levonorgestrel (MIRENA) 20 MCG/24HR IUD, 1 each by Intrauterine route once, Disp: , Rfl:     meloxicam (Mobic) 15 mg tablet, Take 1 tablet (15 mg total) by mouth daily, Disp: 30 tablet, Rfl: 0    methylPREDNISolone 4 MG tablet therapy pack, Use as directed on package, Disp: 21 each, Rfl: 0    Multiple Vitamin (MULTIVITAMIN) capsule, Take 1 capsule by mouth daily, Disp: , Rfl:       Physical Exam:  /70 (BP Location: Left arm, Patient Position: Sitting, Cuff Size: Large)   Pulse 92   Temp 98 °F (36.7 °C) (Temporal)   Resp 16   Ht 5' 5\" (1.651 m)   Wt 104 kg (230 lb)   SpO2 98%   BMI 38.27 kg/m²     Physical Exam  Vitals reviewed.   Constitutional:       General: She is not in acute distress.     Appearance: She is well-developed. She is not ill-appearing.   HENT:      Head: Normocephalic and atraumatic.   Eyes:      General: No scleral icterus.     Conjunctiva/sclera: Conjunctivae normal.   Cardiovascular:      Rate and Rhythm: Normal rate and regular rhythm.      Heart sounds: Normal heart sounds. No murmur heard.  Pulmonary:      Effort: Pulmonary effort is normal. No respiratory distress.      Breath sounds: Normal breath sounds.   Abdominal:      Palpations: Abdomen is soft.      Tenderness: There is no abdominal tenderness.   Musculoskeletal:         General: No tenderness. Normal range of motion.      Cervical back: Normal range of motion and neck supple.      Right lower leg: No edema.      Left lower leg: No edema.   Lymphadenopathy:      Cervical: No cervical adenopathy.   Skin:     General: Skin is warm and dry.   Neurological:      Mental Status: She is alert and oriented to person, place, and time.      Cranial Nerves: No cranial nerve deficit.   Psychiatric:         Mood and Affect: Mood normal.         Behavior: Behavior normal.       Labs:  Lab Results   Component Value Date    WBC 6.3 12/08/2023    HGB 15.2 12/08/2023    HCT 44.2 12/08/2023 "    MCV 86.7 12/08/2023     12/08/2023     I have spent 20 minutes with Patient  today in which greater than 50% of this time was spent in counseling/coordination of care regarding Diagnostic results, Risks and benefits of tx options, Instructions for management, Impressions, Documenting in the medical record, Reviewing / ordering tests, medicine, procedures  , and Obtaining or reviewing history  .     Patient voiced understanding and agreement in the above discussion. Aware to contact our office with questions/symptoms in the interim.     This note has been generated by voice recognition software system.  Therefore, there may be spelling, grammar, and or syntax errors. Please contact if questions arise.

## 2024-01-11 DIAGNOSIS — M17.11 PRIMARY OSTEOARTHRITIS OF RIGHT KNEE: ICD-10-CM

## 2024-01-11 RX ORDER — MELOXICAM 15 MG/1
15 TABLET ORAL DAILY
Qty: 90 TABLET | Refills: 1 | Status: SHIPPED | OUTPATIENT
Start: 2024-01-11

## 2024-01-31 ENCOUNTER — TELEPHONE (OUTPATIENT)
Age: 59
End: 2024-01-31

## 2024-01-31 ENCOUNTER — OFFICE VISIT (OUTPATIENT)
Dept: BARIATRICS | Facility: CLINIC | Age: 59
End: 2024-01-31
Payer: COMMERCIAL

## 2024-01-31 VITALS
SYSTOLIC BLOOD PRESSURE: 128 MMHG | BODY MASS INDEX: 39.78 KG/M2 | DIASTOLIC BLOOD PRESSURE: 75 MMHG | RESPIRATION RATE: 16 BRPM | HEIGHT: 64 IN | WEIGHT: 233 LBS | HEART RATE: 81 BPM

## 2024-01-31 DIAGNOSIS — E61.1 IRON DEFICIENCY: ICD-10-CM

## 2024-01-31 DIAGNOSIS — R73.09 ELEVATED RANDOM BLOOD GLUCOSE LEVEL: ICD-10-CM

## 2024-01-31 DIAGNOSIS — E55.9 VITAMIN D DEFICIENCY: ICD-10-CM

## 2024-01-31 DIAGNOSIS — E66.9 OBESITY, CLASS II, BMI 35-39.9: ICD-10-CM

## 2024-01-31 DIAGNOSIS — E78.5 HLD (HYPERLIPIDEMIA): ICD-10-CM

## 2024-01-31 DIAGNOSIS — E66.9 OBESITY, CLASS II, BMI 35-39.9: Primary | ICD-10-CM

## 2024-01-31 DIAGNOSIS — Z98.84 S/P LAPAROSCOPIC SLEEVE GASTRECTOMY: ICD-10-CM

## 2024-01-31 PROCEDURE — 99215 OFFICE O/P EST HI 40 MIN: CPT | Performed by: INTERNAL MEDICINE

## 2024-01-31 RX ORDER — TIRZEPATIDE 2.5 MG/.5ML
2.5 INJECTION, SOLUTION SUBCUTANEOUS WEEKLY
Qty: 2 ML | Refills: 0 | Status: CANCELLED | OUTPATIENT
Start: 2024-01-31 | End: 2024-02-28

## 2024-01-31 RX ORDER — TIRZEPATIDE 2.5 MG/.5ML
2.5 INJECTION, SOLUTION SUBCUTANEOUS WEEKLY
Qty: 2 ML | Refills: 0 | Status: SHIPPED | OUTPATIENT
Start: 2024-01-31 | End: 2024-02-28

## 2024-01-31 NOTE — TELEPHONE ENCOUNTER
PA for Zepbound    Submitted via  []CMM-KEY    [x][employer]-Case ID # [id number]   []Faxed to plan   []Other website    []Phone call Case ID #      Office notes sent, clinical questions answered. Awaiting determination

## 2024-01-31 NOTE — PATIENT INSTRUCTIONS
General Recommendations:  Nutrition:  Eat breakfast daily.  Do not skip meals.     Food log (ie.) www.[email or web address], sparkpeople.com, loseit.com, calorieking.com, etc.    Practice mindful eating.  Be sure to set aside time to eat, eat slowly, and savor your food.    Hydration:    At least 64oz of water daily.  No sugar sweetened beverages.  No juice (eat the fruit instead).    Exercise:  Studies have shown that the ideal exercise goal is somewhere between 150 to 300 minutes of moderate intensity exercise a week.  Start with exercising 10 minutes every other day and gradually increase physical activity with a goal of at least 150 minutes of moderate intensity exercise a week, divided over at least 3 days a week.  An example of this would be exercising 30 minutes a day, 5 days a week.  Resistance training can increase muscle mass and increase our resting metabolic rate.   FULL BODY resistance training is recommended 2-3 times a week.  Do not do this on consecutive days to allow for muscle recovery.    Aim for a bare minimum 5000 steps, even on days you do not exercise.    Monitoring:   Weigh yourself daily.  If this causes undue stress, then just weigh yourself once a week.  Weigh yourself the same time of the day with the same amount of clothing on.  Preferably this should be done after waking up, before you eat, and with no clothing or minimal clothing on.    Specific Goals:  Food log (ie.) www.[email or web address],sparkpeople.com,loseit.com,calorieking.com,etc.   No sugary beverages. At least 64oz of water daily.  Gradually increase physical activity to a goal of 5 days per week for 30 minutes of MODERATE intensity PLUS 2 days per week of FULL BODY resistance training    Calorie goal:  7215-7164 calories a day (Provided with meal plan to follow).    START Zepbound 2.5mg weekly.  Side effects of Zepbound include nausea, vomiting, diarrhea, or constipation. Keep an eye on your heart rate while on Zepbound . If you  resting heart rate is greater than 100 beats per minutes, please notify me. If you develop severe abdominal pain, stop Zepbound and go to the emergency room, as that could be a sign of pancreatitis.     Nurse visit in 4 weeks  Return visit:  3-4 months

## 2024-01-31 NOTE — TELEPHONE ENCOUNTER
Pt wanting to know if our office has any coupons for zepbound. We can LM on VM with this information.

## 2024-01-31 NOTE — TELEPHONE ENCOUNTER
PA for Zepbound Approved   Date(s) approved until 9/27/24  Case #[id number]     Patient advised by [x] [employer] Message                      [] Phone call       Approval letter scanned into Media No Received approval via [email or web address]

## 2024-01-31 NOTE — PROGRESS NOTES
Assessment/Plan:  Estefania was seen today for consult.    Diagnoses and all orders for this visit:    Obesity, Class II, BMI 35-39.9  -     Hemoglobin A1C; Future  -     TSH, 3rd generation; Future  -     tirzepatide (Zepbound) 2.5 mg/0.5 mL auto-injector; Inject 0.5 mL (2.5 mg total) under the skin once a week for 28 days    S/P laparoscopic sleeve gastrectomy    Vitamin D deficiency    Iron deficiency    HLD (hyperlipidemia)    Elevated random blood glucose level  -     Hemoglobin A1C; Future       - Discussed options of HealthyCORE-Intensive Lifestyle Intervention Program, Very Low Calorie Diet-VLCD, and Conservative Program and the role of weight loss medications.  - Explained the importance of making lifestyle changes first before starting anti-obesity medications.  - Patient should demonstrate lifestyle changes first before anti-obesity medication initiated.   - Patient is interested in pursuing Conservative Program  - Initial weight loss goal of 5-10% weight loss for improved health as studies have shown this is where we see the greatest impact on improving health and decreasing risk of obesity related conditions.  - Weight loss can improve patient's co-morbid conditions and/or prevent weight-related complications.  - Stop Bang 2/8  - Labs reviewed: As below.    General Recommendations:  Nutrition:  Eat breakfast daily.  Do not skip meals.     Food log (ie.) www.OpenLogic.com, sparkpeople.com, loseit.com, calorieking.com, etc.    Practice mindful eating.  Be sure to set aside time to eat, eat slowly, and savor your food.    Hydration:    At least 64oz of water daily.  No sugar sweetened beverages.  No juice (eat the fruit instead).    Exercise:  Studies have shown that the ideal exercise goal is somewhere between 150 to 300 minutes of moderate intensity exercise a week.  Start with exercising 10 minutes every other day and gradually increase physical activity with a goal of at least 150 minutes of moderate  intensity exercise a week, divided over at least 3 days a week.  An example of this would be exercising 30 minutes a day, 5 days a week.  Resistance training can increase muscle mass and increase our resting metabolic rate.   FULL BODY resistance training is recommended 2-3 times a week.  Do not do this on consecutive days to allow for muscle recovery.    Aim for a bare minimum 5000 steps, even on days you do not exercise.    Monitoring:   Weigh yourself daily.  If this causes undue stress, then just weigh yourself once a week.  Weigh yourself the same time of the day with the same amount of clothing on.  Preferably this should be done after waking up, before you eat, and with no clothing or minimal clothing on.    Specific Goals:  Food log (ie.) www.Rumgr.com,sparkpeople.com,ECS Tuningit.com,Yhat.com,etc.   No sugary beverages. At least 64oz of water daily.  Gradually increase physical activity to a goal of 5 days per week for 30 minutes of MODERATE intensity PLUS 2 days per week of FULL BODY resistance training    Calorie goal:  1538-2316 calories a day (Provided with meal plan to follow).    START Zepbound 2.5mg weekly.  Side effects of Zepbound include nausea, vomiting, diarrhea, or constipation. Keep an eye on your heart rate while on Zepbound . If you resting heart rate is greater than 100 beats per minutes, please notify me. If you develop severe abdominal pain, stop Zepbound and go to the emergency room, as that could be a sign of pancreatitis.     Nurse visit in 4 weeks  Return visit:  3-4 months      Total time spent reviewing chart, interviewing patient, examining patient, discussing plan, answering all questions, and documentin min.       ______________________________________________________________________        Subjective:   Chief Complaint   Patient presents with    Consult     MWM Consult; Gw-160/170lb ; Waist-48in ; Stop Bang-       HPI: Estefania Avila  is a 58 y.o. female with  "history of vertical sleeve gastrectomy by Dr. Ruiz on 4/1/2014 and excess weight, here to pursue weight loss management.  Previous notes and records have been reviewed.    HPI  Wt Readings from Last 20 Encounters:   01/31/24 106 kg (233 lb)   01/09/24 104 kg (230 lb)   01/04/24 106 kg (233 lb)   12/15/23 109 kg (240 lb)   12/14/23 107 kg (235 lb)   11/29/23 107 kg (235 lb)   11/27/23 107 kg (236 lb)   11/17/23 106 kg (234 lb 8 oz)   10/19/23 104 kg (230 lb)   08/03/23 107 kg (236 lb)   07/25/23 107 kg (235 lb)   01/31/23 102 kg (225 lb)   01/10/23 108 kg (237 lb)   11/10/22 104 kg (230 lb)   10/24/22 106 kg (234 lb)   10/17/22 107 kg (235 lb)   01/24/22 102 kg (225 lb 9.6 oz)   01/11/22 103 kg (227 lb 1.2 oz)   12/20/21 103 kg (226 lb 6 oz)   12/20/21 103 kg (226 lb 12.8 oz)     Excess Weight:  Presurgery weight 289.  Weight on Day of Weight Loss Surgery: 272   Weight in (lb) to have BMI = 25: 151.9#  [place] Wt: 160 lbs per pt report  Was able to maintain 180-190 for years  Attributed the following to gaining weight:  Dating (dining out), remarried (\"settling in\"), covid, menopause.  + high carb intake  Boredom eater    Surgical team from 2016 until 7/25/2023    What has been tried:   Diet and Exercise  Phenobarbital  at age 8 for weight per patient.    Hunger/Cravings:  + Pasta, rice  Dining out: 1-2 times a week  Hydration:  Water  64-80oz    0-1 dietcoke a day  Alcohol:  3-4 drinks each week (diet coke and whiskey or vodka seltzer)  Smoking:  No  Exercise:  Yoga twice a week, walkswith dog daily (20 min)  Weight Monitoring:  No  Sleep:  8 hours a night  STOP-BANG Score:  2/8  Occupation:  [employer]    Last met with RD on 12/14/2023.  Advised a 1200 to 1400-calorie day diet.  Advised to decrease coffee intake to 16 ounces a day.  Also advised to protein in each meal and eliminate grazing and sugar sweetened beverages.    Past Medical History:   Diagnosis Date    Abnormal Pap smear of cervix 05/2016 5/2016 " "LGSIL, + other HPV; 6/2016 Colpo JANA 1. 12/2021 pap neg/ HPV neg.    Anemia     DVT (deep venous thrombosis) (HCC)     Factor V Leiden mutation (HCC)     GERD (gastroesophageal reflux disease)     Obesity, Class II, BMI 35-39.9     PONV (postoperative nausea and vomiting)     Pulmonary embolism (HCC)     S/P gastric surgery     gastric sleeve     Sleep apnea     wears c-pap, Last assessed: 3/7/14     Patient denies personal and family history of  pancreatitis, thyroid cancer, MEN-2 tumors.  Denies any hx of glaucoma, seizures, + kidney stones (\"when living on diet coke I had a kidney stone on one occasion\"), + gallstones s/p cholecystectomy.  Denies Hx of CAD, PAD, palpitations, arrhythmia.   Denies uncontrolled anxiety or depression, suicidal behavior or thinking , insomnia or sleep disturbance.     Past Surgical History:   Procedure Laterality Date    BARIATRIC SURGERY      CHOLECYSTECTOMY      COLONOSCOPY      complete, resolved: 1996    EGD AND COLONOSCOPY N/A 03/08/2018    Procedure: EGD AND COLONOSCOPY;  Surgeon: Joy Elias MD;  Location: [facility];  Service: Gastroenterology    GALLBLADDER SURGERY      IVC FILTER INSERTION  04/01/2014    Radiologic Supervision: [place] Filter Placement in IVC    MOUTH SURGERY      STOMACH SURGERY  04/01/2014    sleeve, Gastric surgery for Morbid Obesity Laparoscopic Longitudinal Gastrectomy, per allscripts    UPPER GASTROINTESTINAL ENDOSCOPY       The following portions of the patient's history were reviewed and updated as appropriate: allergies, current medications, past family history, past medical history, past social history, past surgical history, and problem list.    Review Of Systems:  Review of Systems   Constitutional:  Negative for activity change, appetite change, fatigue and fever.   Respiratory:  Negative for cough and shortness of breath.    Cardiovascular:  Negative for chest pain, palpitations and leg swelling.   Gastrointestinal:  Negative for " "abdominal pain, constipation, diarrhea, nausea and vomiting.   Endocrine: Negative for cold intolerance and heat intolerance.   Genitourinary:  Negative for difficulty urinating and dysuria.   Musculoskeletal:  Negative for arthralgias, back pain and gait problem.   Skin:  Negative for pallor and rash.   Neurological:  Negative for headaches.   Psychiatric/Behavioral:  Negative for dysphoric mood, sleep disturbance and suicidal ideas (or HI). The patient is not nervous/anxious.      Objective:  /75   Pulse 81   Resp 16   Ht 5' 4\" (1.626 m)   Wt 106 kg (233 lb)   BMI 39.99 kg/m²   Physical Exam  Vitals and nursing note reviewed.   Constitutional:       General: She is not in acute distress.     Appearance: Normal appearance. She is not ill-appearing or diaphoretic.   Eyes:      General: No scleral icterus.  Cardiovascular:      Rate and Rhythm: Normal rate and regular rhythm.      Pulses: Normal pulses.      Heart sounds: No murmur heard.  Pulmonary:      Effort: Pulmonary effort is normal. No respiratory distress.      Breath sounds: Normal breath sounds. No wheezing or rhonchi.   Musculoskeletal:      Cervical back: Neck supple.      Right lower leg: No edema.      Left lower leg: No edema.   Lymphadenopathy:      Cervical: No cervical adenopathy.   Skin:     Capillary Refill: Capillary refill takes less than 2 seconds.      Findings: No lesion or rash.   Neurological:      Mental Status: She is alert and oriented to person, place, and time.      Gait: Gait normal.   Psychiatric:         Mood and Affect: Mood normal.         Behavior: Behavior normal.         Labs and Imaging  Recent labs and imaging have been personally reviewed.  Lab Results   Component Value Date    WBC 6.3 12/08/2023    HGB 15.2 12/08/2023    HCT 44.2 12/08/2023    MCV 86.7 12/08/2023     12/08/2023     Lab Results   Component Value Date     04/03/2014    SODIUM 139 12/08/2023    K 4.4 12/08/2023     12/08/2023    " "CO2 27 12/08/2023    ANIONGAP 2 (L) 04/03/2014    AGAP 9 02/11/2020    BUN 18 12/08/2023    CREATININE 0.70 12/08/2023    GLUC 101 (H) 12/08/2023    CALCIUM 9.7 12/08/2023    AST 13 12/08/2023    ALT 17 12/08/2023    ALKPHOS 86 12/08/2023    TP 7.4 12/08/2023    TBILI 0.7 12/08/2023    EGFR 101 12/08/2023     No results found for: \"HGBA1C\"  Lab Results   Component Value Date    TSH 1.50 03/18/2019     Lab Results   Component Value Date    CHOLESTEROL 241 (H) 12/08/2023     Lab Results   Component Value Date    HDL 53 12/08/2023     Lab Results   Component Value Date    TRIG 192 (H) 12/08/2023     Lab Results   Component Value Date    LDLCALC 155 (H) 12/08/2023      "

## 2024-01-31 NOTE — TELEPHONE ENCOUNTER
Patient calling in and asking for Zepbound to be reorder for a 3 month supply instead of one month     Patient stated that it is cheapier for her through insurance to have the 3 month supply     3 month supply is already approved by insurance

## 2024-02-02 NOTE — TELEPHONE ENCOUNTER
Called pt back to confirm she is aware 3 month supply not available at this time, pt confirms, no other questions or concerns at this time.

## 2024-02-16 ENCOUNTER — HOSPITAL ENCOUNTER (OUTPATIENT)
Dept: MAMMOGRAPHY | Facility: MEDICAL CENTER | Age: 59
Discharge: HOME | End: 2024-02-16
Payer: COMMERCIAL

## 2024-02-16 VITALS — HEIGHT: 64 IN | BODY MASS INDEX: 39.78 KG/M2 | WEIGHT: 233 LBS

## 2024-02-16 DIAGNOSIS — Z12.31 ENCOUNTER FOR SCREENING MAMMOGRAM FOR MALIGNANT NEOPLASM OF BREAST: ICD-10-CM

## 2024-02-16 PROCEDURE — 77063 BREAST TOMOSYNTHESIS BI: CPT

## 2024-02-16 PROCEDURE — 77067 SCR MAMMO BI INCL CAD: CPT

## 2024-02-19 NOTE — PROGRESS NOTES
Behavioral Health Follow Up Note        Name:Estefania Avila             3/6 weight check   Starting weight 235  Last time weight 233  Today's weight 233.6  BMI:40.10    Surgeon:Dr. Ruiz  Type of Surgery: RNY    Mental health and wellness -   Patient presents to the office today for a weight check and support visit. The pt reported she started  started Zepbound and does not feel as hungry. Reports the medication is really curbing her appetite.  The pt's spouse is supportive of surgery. Sleeping well.     Eating behaviors - three meals a day or two meals and a snack. Uses measuring cups. Taking time eating and practicing 30/60 rule. Hydration-64 oz to 72 oz a day, coffee two a day, soda one a day (coke zero). Reading bariatric book. Improvement in protein added yogurt and cottage cheese to her daily food intake.     Activity -  treadmill 15-20 minute sessions a day and on the weekends she is active     Progress toward program requirements    Workflow:  Psych and/or D+A Clearance: not needed  PCP Letter:  Surgeon Appt.: done  EGD : pending  Cardiac Risk Assessment: pending  Sleep Studies: reports she does not need a CPAP  Bloodwork: pending  Nicotine test:non-smoker  Support Group: recommended     Reviewed and discussed  Patient educated and handouts provided.  Adequate hydration  Exercise  Meal planning and preparation  Lifestyle changes  Possible problems with poor eating habits  Practice mindful eating.    Setting aside time to eat slowly, and savor food    Goal: 1-maintain healthy eating habits  Next appointment: 3/26/24 RD

## 2024-02-21 PROBLEM — Z12.11 COLON CANCER SCREENING: Status: RESOLVED | Noted: 2018-03-01 | Resolved: 2024-02-21

## 2024-02-22 ENCOUNTER — CLINICAL SUPPORT (OUTPATIENT)
Dept: BARIATRICS | Facility: CLINIC | Age: 59
End: 2024-02-22

## 2024-02-22 VITALS — BODY MASS INDEX: 40.1 KG/M2 | WEIGHT: 233.6 LBS

## 2024-02-22 DIAGNOSIS — E66.01 OBESITY, CLASS III, BMI 40-49.9 (MORBID OBESITY) (HCC): Primary | ICD-10-CM

## 2024-02-22 PROCEDURE — RECHECK

## 2024-03-01 ENCOUNTER — CLINICAL SUPPORT (OUTPATIENT)
Dept: BARIATRICS | Facility: CLINIC | Age: 59
End: 2024-03-01

## 2024-03-01 ENCOUNTER — TELEPHONE (OUTPATIENT)
Dept: BARIATRICS | Facility: CLINIC | Age: 59
End: 2024-03-01

## 2024-03-01 VITALS
TEMPERATURE: 97.1 F | SYSTOLIC BLOOD PRESSURE: 122 MMHG | RESPIRATION RATE: 16 BRPM | DIASTOLIC BLOOD PRESSURE: 70 MMHG | WEIGHT: 231.8 LBS | HEIGHT: 64 IN | BODY MASS INDEX: 39.57 KG/M2 | HEART RATE: 83 BPM

## 2024-03-01 DIAGNOSIS — E66.01 OBESITY, CLASS III, BMI 40-49.9 (MORBID OBESITY) (HCC): Primary | ICD-10-CM

## 2024-03-01 DIAGNOSIS — R63.5 ABNORMAL WEIGHT GAIN: Primary | ICD-10-CM

## 2024-03-01 PROCEDURE — RECHECK

## 2024-03-01 RX ORDER — TIRZEPATIDE 2.5 MG/.5ML
2.5 INJECTION, SOLUTION SUBCUTANEOUS WEEKLY
COMMUNITY
End: 2024-03-01

## 2024-03-01 RX ORDER — TIRZEPATIDE 5 MG/.5ML
5 INJECTION, SOLUTION SUBCUTANEOUS WEEKLY
Qty: 2 ML | Refills: 0 | Status: SHIPPED | OUTPATIENT
Start: 2024-03-01 | End: 2024-03-29

## 2024-03-01 NOTE — TELEPHONE ENCOUNTER
Patient come in for a nurse visit today. Patient is requesting a dose increase of  her zepbpond. Patient tolerated previous dose without any incident.

## 2024-03-01 NOTE — PROGRESS NOTES
Patient last visit weight:233lb  Patient current visit weight:231.8lb    If you are taking phentermine or other oral weight loss medications, are you experiencing any of the following symptoms:  Headache:   Blurred Vision:   Chest Pain:   Palpitations:  Insomnia:   SPECIFY ORAL MEDICATION AND DOSAGE:     If you are taking an injectable medication,  are you experiencing any of the following symptoms:  Bloating: NO  Nausea:NO  Vomiting: NO  Constipation: NO  Diarrhea:NO  SPECIFY INJECTABLE MEDICATION AND CURRENT DOSAGE: ZEPBOUND      Vitals:    Is BP less than 100/60?NO  Is BP greater than 140/90?NO  Is HR greater than 100?NO  **If yes to any of the above, have patient relax and repeat in 5-10 minutes**    Repeat values:    Is BP less than 100/60?  Is BP greater than 140/90?  Is HR greater than 100?  **If values remain outside of ranges above, please consult provider for next steps**

## 2024-03-02 NOTE — PLAN OF CARE
Problem: Potential for Falls  Goal: Patient will remain free of falls  Description: INTERVENTIONS:  - Educate patient/family on patient safety including physical limitations  - Instruct patient to call for assistance with activity   - Consult OT/PT to assist with strengthening/mobility   - Keep Call bell within reach  - Keep bed low and locked with side rails adjusted as appropriate  - Keep care items and personal belongings within reach  - Initiate and maintain comfort rounds  - Make Fall Risk Sign visible to staff  -- Apply yellow socks and bracelet for high fall risk patients  - Consider moving patient to room near nurses station  Outcome: Progressing
Home

## 2024-03-19 ENCOUNTER — OFFICE VISIT (OUTPATIENT)
Dept: OBGYN CLINIC | Facility: CLINIC | Age: 59
End: 2024-03-19
Payer: COMMERCIAL

## 2024-03-19 VITALS
BODY MASS INDEX: 39.44 KG/M2 | WEIGHT: 231 LBS | SYSTOLIC BLOOD PRESSURE: 120 MMHG | DIASTOLIC BLOOD PRESSURE: 70 MMHG | HEIGHT: 64 IN

## 2024-03-19 DIAGNOSIS — M17.11 PRIMARY OSTEOARTHRITIS OF RIGHT KNEE: Primary | ICD-10-CM

## 2024-03-19 PROCEDURE — 20610 DRAIN/INJ JOINT/BURSA W/O US: CPT | Performed by: PHYSICIAN ASSISTANT

## 2024-03-19 PROCEDURE — 99213 OFFICE O/P EST LOW 20 MIN: CPT | Performed by: PHYSICIAN ASSISTANT

## 2024-03-19 RX ORDER — BETAMETHASONE SODIUM PHOSPHATE AND BETAMETHASONE ACETATE 3; 3 MG/ML; MG/ML
6 INJECTION, SUSPENSION INTRA-ARTICULAR; INTRALESIONAL; INTRAMUSCULAR; SOFT TISSUE
Status: COMPLETED | OUTPATIENT
Start: 2024-03-19 | End: 2024-03-19

## 2024-03-19 RX ORDER — BUPIVACAINE HYDROCHLORIDE 2.5 MG/ML
2 INJECTION, SOLUTION INFILTRATION; PERINEURAL
Status: COMPLETED | OUTPATIENT
Start: 2024-03-19 | End: 2024-03-19

## 2024-03-19 RX ORDER — LIDOCAINE HYDROCHLORIDE 10 MG/ML
2 INJECTION, SOLUTION INFILTRATION; PERINEURAL
Status: COMPLETED | OUTPATIENT
Start: 2024-03-19 | End: 2024-03-19

## 2024-03-19 RX ADMIN — BETAMETHASONE SODIUM PHOSPHATE AND BETAMETHASONE ACETATE 6 MG: 3; 3 INJECTION, SUSPENSION INTRA-ARTICULAR; INTRALESIONAL; INTRAMUSCULAR; SOFT TISSUE at 09:30

## 2024-03-19 RX ADMIN — BUPIVACAINE HYDROCHLORIDE 2 ML: 2.5 INJECTION, SOLUTION INFILTRATION; PERINEURAL at 09:30

## 2024-03-19 RX ADMIN — LIDOCAINE HYDROCHLORIDE 2 ML: 10 INJECTION, SOLUTION INFILTRATION; PERINEURAL at 09:30

## 2024-03-19 NOTE — PROGRESS NOTES
"Patient Name:  Estefania Avila  MRN:  [id number]    Assessment & Plan     Right knee DJD.  Corticosteroid injection performed today of the right knee.  Activities as tolerated with modification avoid pain.  Advil as needed for pain.  Follow-up in 3 months.  Consider repeat injection at that time as indicated.    Chief Complaint     Right knee pain    History of the Present Illness     Estefania Avila is a 58 y.o. female who reports to the office today for evaluation of her right knee.  She was last seen on 12/15/2023.  At that time she received a corticosteroid injection into the right knee.  She noted significant improvement after undergoing the injection.  She notes a gradual return of her pain recently.  She however states her pain is much less severe as her initial presentation.  Currently pain is 3-4 out of 10 in intensity.  Pain is worse with increased activity and prolonged weightbearing.  She denies any significant swelling weakness or instability.  She does note stiffness in the knee.  No numbness or tingling.  No fevers or chills.  She currently takes Advil with some improvement.  She is interested in a repeat intra-articular corticosteroid injection today.    Physical Exam     /70   Ht 5' 4\" (1.626 m)   Wt 105 kg (231 lb)   BMI 39.65 kg/m²     Right knee: No gross deformity.  Skin intact.  No erythema ecchymosis or swelling.  No significant effusion.  No joint line tenderness.  Range of motion 0 to 110 degrees with slight discomfort at terminal flexion.  Crepitation is appreciated with passive range of motion.  Stable to varus and valgus stress without pain.  Stable Lachman test.  Negative posterior drawer test.  Negative Moreno's test.  Extensor mechanism is intact.    Eyes: Anicteric sclerae.  ENT: Trachea midline.  Lungs: Normal respiratory effort.  CV: Capillary refill is less than 2 seconds.  Skin: Intact without erythema.  Lymph: No palpable lymphadenopathy.  Neuro: Sensation is grossly intact " to light touch.  Psych: Mood and affect are appropriate.    Data Review     I have personally reviewed pertinent films in PACS, and my interpretation follows:    X-rays right knee 12/15/2023: No acute osseous abnormality.  No fracture or dislocation.  Tricompartment degenerative changes moderate to severe in the medial and patellofemoral compartments     Past Medical History:   Diagnosis Date    Abnormal Pap smear of cervix 05/2016 5/2016 LGSIL, + other HPV; 6/2016 Colpo JANA 1. 12/2021 pap neg/ HPV neg.    Anemia     DVT (deep venous thrombosis) ([facility])     Factor V Leiden mutation ([facility])     GERD (gastroesophageal reflux disease)     Obesity, Class II, BMI 35-39.9     PONV (postoperative nausea and vomiting)     Pulmonary embolism ([facility])     S/P gastric surgery     gastric sleeve     Sleep apnea     wears c-pap, Last assessed: 3/7/14       Past Surgical History:   Procedure Laterality Date    BARIATRIC SURGERY      CHOLECYSTECTOMY      COLONOSCOPY      complete, resolved: 1996    EGD AND COLONOSCOPY N/A 03/08/2018    Procedure: EGD AND COLONOSCOPY;  Surgeon: Joy Elias MD;  Location: [facility];  Service: Gastroenterology    GALLBLADDER SURGERY      IVC FILTER INSERTION  04/01/2014    Radiologic Supervision: [place] Filter Placement in IVC    MOUTH SURGERY      STOMACH SURGERY  04/01/2014    sleeve, Gastric surgery for Morbid Obesity Laparoscopic Longitudinal Gastrectomy, per allscripts    UPPER GASTROINTESTINAL ENDOSCOPY         Allergies   Allergen Reactions    Pseudoephedrine Tachycardia    Latex Rash       Current Outpatient Medications on File Prior to Visit   Medication Sig Dispense Refill    ferrous sulfate 325 (65 Fe) mg tablet Take 325 mg by mouth daily with breakfast      levonorgestrel (MIRENA) 20 MCG/24HR IUD 1 each by Intrauterine route once      Multiple Vitamin (MULTIVITAMIN) capsule Take 1 capsule by mouth daily      tirzepatide (Zepbound) 5 mg/0.5 mL auto-injector Inject 0.5 mL (5 mg  total) under the skin once a week for 28 days 2 mL 0     No current facility-administered medications on file prior to visit.       Social History     Tobacco Use    Smoking status: Never     Passive exposure: Never    Smokeless tobacco: Never    Tobacco comments:     current non-smoker, per allscripts   Vaping Use    Vaping status: Never Used   Substance Use Topics    Alcohol use: Yes     Alcohol/week: 2.0 standard drinks of alcohol     Types: 2 Standard drinks or equivalent per week     Comment: Socially    Drug use: No       Family History   Problem Relation Age of Onset    Osteoporosis Mother     Diabetes Father     Heart disease Father     Arthritis Father     Heart failure Father     Hypertension Father     Ovarian cancer Maternal Grandmother 82    No Known Problems Maternal Grandfather     No Known Problems Paternal Grandmother     No Known Problems Paternal Grandfather     No Known Problems Paternal Aunt     No Known Problems Paternal Aunt     No Known Problems Paternal Aunt     No Known Problems Paternal Aunt     Colon cancer Neg Hx     Breast cancer Neg Hx     Uterine cancer Neg Hx        Review of Systems     As stated in the HPI. All other systems reviewed and are negative.      Large joint arthrocentesis: R knee  Procedure Details  Location: knee - R knee  Needle size: 22 G  Ultrasound guidance: no  Approach: anterolateral  Medications administered: 2 mL bupivacaine 0.25 %; 2 mL lidocaine 1 %; 6 mg betamethasone acetate-betamethasone sodium phosphate 6 (3-3) mg/mL    Patient tolerance: patient tolerated the procedure well with no immediate complications  Dressing:  Sterile dressing applied           PRINCIPAL DISCHARGE DIAGNOSIS  Diagnosis: Dysarthria  Assessment and Plan of Treatment: - was atributed to polypharmacy   - we stopped olanzapin and ambien for you   - we ruled out stroke or progression disease in the breain or spine  - please take your pain and anxiety medication as directed   - follow up with primary oncologist as scheduled      SECONDARY DISCHARGE DIAGNOSES  Diagnosis: Breast cancer, stage 4  Assessment and Plan of Treatment: resumed your medicaiton as directed    Diagnosis: Pericardial effusion  Assessment and Plan of Treatment: resumed your medicaiton as directed

## 2024-03-26 ENCOUNTER — CLINICAL SUPPORT (OUTPATIENT)
Dept: BARIATRICS | Facility: CLINIC | Age: 59
End: 2024-03-26

## 2024-03-26 DIAGNOSIS — Z98.84 S/P LAPAROSCOPIC SLEEVE GASTRECTOMY: Primary | ICD-10-CM

## 2024-03-26 DIAGNOSIS — E66.01 OBESITY, CLASS III, BMI 40-49.9 (MORBID OBESITY) (HCC): ICD-10-CM

## 2024-03-26 DIAGNOSIS — E66.9 OBESITY, CLASS II, BMI 35-39.9: ICD-10-CM

## 2024-03-26 PROCEDURE — RECHECK: Performed by: DIETITIAN, REGISTERED

## 2024-03-26 RX ORDER — TIRZEPATIDE 5 MG/.5ML
5 INJECTION, SOLUTION SUBCUTANEOUS WEEKLY
Qty: 2 ML | Refills: 3 | Status: SHIPPED | OUTPATIENT
Start: 2024-03-26 | End: 2024-07-16

## 2024-03-26 NOTE — PROGRESS NOTES
Bariatric Nutrition Assessment Note    Preop  6 Month Program- Level 2 revision   4/6 Today     Type of surgery  Vertical sleeve gastrectomy  Surgery Date: 4/1/2014  9 1/2 years  post-op  Surgeon: Dr. Jeff Ruiz    Nutrition Assessment   Estefania Avila  58 y.o.  female  Wt 103 kg (226 lb 11.2 oz)   BMI 38.91 kg/m²     - 5.1# x 1 month   Pt ordered zepbound over past month   Reports that this has helped her lose weight       Wt Readings from Last 3 Encounters:   03/19/24 105 kg (231 lb)   03/01/24 105 kg (231 lb 12.8 oz)   02/22/24 106 kg (233 lb 9.6 oz)        [place]- . Lexi Equation:  1657  Estimated calories for weight maintenance:  1988  ( sedentary  )   Estimated calories for weight loss 988-1488 ( 1-2# per wk wt loss - sedentary )  Estimated protein needs  g/d (1.0-1.5 gms/kg IBW )   Estimated fluid needs 69-81 oz/ d (30-35 ml/kg IBW )      Weight on Day of Weight Loss Surgery: 272#  Weight in (lb) to have BMI = 25: 151.9#  Pancho Wt: 160 lbs per pt report  Was able to maintain 180-190 # for years  S/P Covid infection gained weight and has been unable to lose it   Post-Op Wt Loss: 43.4#/ 34% EBWL in 9 1/2  year(s)    Review of History and Medications   Past Medical History:   Diagnosis Date    Abnormal Pap smear of cervix 05/2016 5/2016 LGSIL, + other HPV; 6/2016 Colpo JANA 1. 12/2021 pap neg/ HPV neg.    Anemia     DVT (deep venous thrombosis) (HCC)     Factor V Leiden mutation (HCC)     GERD (gastroesophageal reflux disease)     Obesity, Class II, BMI 35-39.9     PONV (postoperative nausea and vomiting)     Pulmonary embolism (HCC)     S/P gastric surgery     gastric sleeve     Sleep apnea     wears c-pap, Last assessed: 3/7/14     Past Surgical History:   Procedure Laterality Date    BARIATRIC SURGERY      CHOLECYSTECTOMY      COLONOSCOPY      complete, resolved: 1996    EGD AND COLONOSCOPY N/A 03/08/2018    Procedure: EGD AND COLONOSCOPY;  Surgeon: Joy Elias MD;  Location: [facility]  LAB;  Service: Gastroenterology    GALLBLADDER SURGERY      IVC FILTER INSERTION  04/01/2014    Radiologic Supervision: Felicitas Filter Placement in IVC    MOUTH SURGERY      STOMACH SURGERY  04/01/2014    sleeve, Gastric surgery for Morbid Obesity Laparoscopic Longitudinal Gastrectomy, per allscripts    UPPER GASTROINTESTINAL ENDOSCOPY       Social History     Socioeconomic History    Marital status: /Civil Union     Spouse name: Not on file    Number of children: 1    Years of education: Not on file    Highest education level: Not on file   Occupational History    Not on file   Tobacco Use    Smoking status: Never     Passive exposure: Never    Smokeless tobacco: Never    Tobacco comments:     current non-smoker, per allscripts   Vaping Use    Vaping status: Never Used   Substance and Sexual Activity    Alcohol use: Yes     Alcohol/week: 2.0 standard drinks of alcohol     Types: 2 Standard drinks or equivalent per week     Comment: Socially    Drug use: No    Sexual activity: Yes     Partners: Male     Birth control/protection: I.U.D.   Other Topics Concern    Not on file   Social History Narrative    , per allscripts    Exercise frequency (times/week)     Social Determinants of Health     Financial Resource Strain: Not on file   Food Insecurity: Not on file   Transportation Needs: Not on file   Physical Activity: Not on file   Stress: Not on file   Social Connections: Not on file   Intimate Partner Violence: Not on file   Housing Stability: Not on file       Current Outpatient Medications:     ferrous sulfate 325 (65 Fe) mg tablet, Take 325 mg by mouth daily with breakfast, Disp: , Rfl:     levonorgestrel (MIRENA) 20 MCG/24HR IUD, 1 each by Intrauterine route once, Disp: , Rfl:     Multiple Vitamin (MULTIVITAMIN) capsule, Take 1 capsule by mouth daily, Disp: , Rfl:     tirzepatide (Zepbound) 5 mg/0.5 mL auto-injector, Inject 0.5 mL (5 mg total) under the skin once a week for 28 days, Disp: 2 mL,  Rfl: 0    Food Intake and Lifestyle Assessment   Food Intake Assessment completed via usual diet recall  Breakfast: 9 am / 9:30   Coffee at home- with low sugar creamer 60 calories ( 4 Tbsp )   Egg bites or cottage cheese   Snack: 0   Lunch: 12/1 pm Salad kit pre purchased ~ 300  Does not always have protein in it   Snack: 2 rice cakes with PB   Dinner: 6/6:30 pm   Could be salad with protein   Cheese kourtney (  travelingg )  Sauteed spinach and onions   Snack: 0- sometimes coke zero   Some snacking  on weekends/ day soff   Triskets with cream cheese ( chive flavored )   Cucumber black olives with vingerette and grape leaves   Out to eat will eat french fries and salad   Beverage intake: water, diet soda, and coffee/tea  Diet texture/stage: regular  Protein supplement: none currently   Estimated protein intake per day: ~40 grams   Estimated fluid intake per day: 68 -70 oz per day   Meals eaten away from home: once per week   Typical meal pattern: 3 meals per day and 2 snacks per day  Eating Behaviors: Appropriate diet advancement, Appropriate portion sizes, Does not drink with meals and waits 30-minutes after meal before resuming drinking, and Mindless eating    Vitamin and Mineral regimen: multivitamin and mineral, plus additional iron supplement   IUD has stopped excessive menstrual flow   Currently not taking calcium or extra vitamin D    Food allergies or intolerances: fried foods cause nausea/ regurgitation   Cultural or Voodoo considerations: n/a     Physical Assessment  Nutrition Related Findings  Constipation, Loss of Hunger, and high fat foods causes regurgitation   Pt reports that zepbound has resolved her constipation.  She is currently having 1 BM per day     Physical Activity  Types of exercise: Walking  Yoga- twice per week   Current physical limitations: joint pain     Psychosocial Assessment   Support systems: spouse( wife)   Socioeconomic factors: works full time     Nutrition  "Diagnosis- continued   Diagnosis: Overweight / Obesity (NC-3.3)  Related to: Limited adherence to nutrition-related recommendations and Physical inactivity  As Evidenced by: BMI >25 and Unintentional weight gain     Nutrition Prescription: Recommend the following diet  1200 -1400 calories/ 75 -85 grams protein   80 oz/ fluid per day     Meal Plan ( Navneet/Pro/Carb)   Breakfast: 200/15/30  Snack: 150/>5/15  Lunch: 300/25/30  Snack: 150/>5/15  Dinner: 300/25/30  Snack: 150/>5/15      Interventions and Teaching   Patient educated on post-op nutrition guidelines.       Patient educated and handouts provided.  Surgical changes to stomach / GI  Capacity of post-surgery stomach  Diet progression  Adequate hydration  Sugar and fat restriction to decrease \"dumping syndrome\"  Fat restriction to decrease steatorrhea  Expected weight loss  Weight loss plateaus/ possibility of weight regain  Exercise  Suggestions for pre-op diet  Nutrition considerations after surgery  Protein supplements  Meal planning and preparation  Appropriate carbohydrate, protein, and fat intake, and food/fluid choices to maximize safe weight loss, nutrient intake, and tolerance   Dietary and lifestyle changes  Possible problems with poor eating habits  Intuitive eating  Techniques for self monitoring and keeping daily food journal  Potential for food intolerance after surgery, and ways to deal with them including: lactose intolerance, nausea, reflux, vomiting, diarrhea, food intolerance, appetite changes, gas  Vitamin / Mineral supplementation of Multivitamin with minerals, Calcium, Vitamin B12, Iron, Fat Soluble vitamins, and Vitamin D  Patient is not currently pregnant and doesn't desire to become pregnant a minimum of one year post-op- pt is menopausal, has IUD to control monthly bleeding.     Reviewed non animal based protein sources and lacto ovo protein sources to increase protein in diet     Education provided to: patient    Barriers to learning: No " barriers identified    Readiness to change: preparation    Comprehension: needs reinforcement and verbalizes understanding     Expected Compliance: good    Recommendations  Pt is an appropriate candidate for surgery. Yes      Evaluation/Monitoring   Eating pattern as discussed Tolerance of nutrition prescription Body weight Lab values Physical activity Bowel pattern  Pt has returned to food logging on MFP and finds it helpful.   She is having a good response to zepbound .  She has a decrease in bloating and her constipation is resolved.    She has decreased her coffee intake to 98 ounces per day as a homemade iced coffee with farilife milk.      Goals- continued   Eliminate sugar sweetened beverages, Food journal, Eat 3 meals per day, Eliminate mindless snacking, and include protein at each meal   Food log MFP 1200 -1400 calories per day    Do not go below 800 calories   Include protein at each meal        Time Spent:   30 minutes

## 2024-03-27 VITALS — WEIGHT: 226.7 LBS | BODY MASS INDEX: 38.91 KG/M2

## 2024-04-21 DIAGNOSIS — E66.01 OBESITY, CLASS III, BMI 40-49.9 (MORBID OBESITY) (HCC): ICD-10-CM

## 2024-04-22 RX ORDER — TIRZEPATIDE 5 MG/.5ML
5 INJECTION, SOLUTION SUBCUTANEOUS WEEKLY
Qty: 2 ML | Refills: 0 | Status: SHIPPED | OUTPATIENT
Start: 2024-04-22 | End: 2024-04-23

## 2024-04-22 NOTE — PROGRESS NOTES
Behavioral Health Follow Up Note        Name: Estefania Avila             5/6 weight check   Starting weight 235  Last time weight 226.7  Today's weight 225.3  BMI: 38.91    Surgeon:Dr. Ruiz   Type of Surgery: RNY     Mental health and wellness -   Patient presents to the office today for a weight check and support visit. The pt reported she was conflicted not knowing if she wants to continue the surgical process or continue with medication. The pt explained she has been overweight since age 7 years old and has seen a lot of progress with medication. Her bowels are better then in the past. The pt stated she will speak with her PCP and make a decision as to what avenue she would like to continue.      Eating behaviors - three meals a day 1-2 snacks in between. Chewing food slowly increased protein. Practicing 30/60 rule Hydration- water 72-84 oz, One coke zero a day and one coffee. Portioning food with my fitness pal. Reading bariatric book.     Activity -  yoga twice a week and treadmill during the week.     Progress toward program requirements    Workflow:    Psych and/or D+A Clearance: not needed  PCP Letter:  Surgeon Appt.: done  EGD :   Cardiac Risk Assessment: pending  Sleep Studies not needed  Bloodwork: pending  Nicotine test:non-smoker  Support Group: recommended     Reviewed and discussed  Patient educated and handouts provided.  Adequate hydration  Exercise  Meal planning and preparation  Lifestyle changes  Possible problems with poor eating habits  Practice mindful eating.    Setting aside time to eat slowly, and savor food    Goal: 1- will speak to PCP about what avenue to pursue MWM or surgery   Next appointment:5/21/24 RD

## 2024-04-23 ENCOUNTER — CLINICAL SUPPORT (OUTPATIENT)
Dept: BARIATRICS | Facility: CLINIC | Age: 59
End: 2024-04-23

## 2024-04-23 DIAGNOSIS — E66.9 OBESITY, CLASS II, BMI 35-39.9: Primary | ICD-10-CM

## 2024-04-23 DIAGNOSIS — E66.9 OBESITY, CLASS II, BMI 35-39.9: ICD-10-CM

## 2024-04-23 PROCEDURE — RECHECK

## 2024-04-23 RX ORDER — TIRZEPATIDE 7.5 MG/.5ML
7.5 INJECTION, SOLUTION SUBCUTANEOUS WEEKLY
Qty: 2 ML | Refills: 0 | Status: SHIPPED | OUTPATIENT
Start: 2024-04-23 | End: 2024-05-21

## 2024-04-23 RX ORDER — TIRZEPATIDE 7.5 MG/.5ML
7.5 INJECTION, SOLUTION SUBCUTANEOUS WEEKLY
Qty: 2 ML | Refills: 0 | Status: SHIPPED | OUTPATIENT
Start: 2024-04-23 | End: 2024-04-23 | Stop reason: SDUPTHER

## 2024-05-21 ENCOUNTER — CLINICAL SUPPORT (OUTPATIENT)
Dept: BARIATRICS | Facility: CLINIC | Age: 59
End: 2024-05-21

## 2024-05-21 VITALS — WEIGHT: 226.3 LBS | BODY MASS INDEX: 38.84 KG/M2

## 2024-05-21 DIAGNOSIS — D50.8 ACQUIRED IRON DEFICIENCY ANEMIA DUE TO DECREASED ABSORPTION: ICD-10-CM

## 2024-05-21 DIAGNOSIS — E66.9 OBESITY, CLASS II, BMI 35-39.9: ICD-10-CM

## 2024-05-21 DIAGNOSIS — K91.2 POSTSURGICAL MALABSORPTION: ICD-10-CM

## 2024-05-21 DIAGNOSIS — E61.1 IRON DEFICIENCY: ICD-10-CM

## 2024-05-21 DIAGNOSIS — Z98.84 S/P LAPAROSCOPIC SLEEVE GASTRECTOMY: ICD-10-CM

## 2024-05-21 DIAGNOSIS — Z01.818 PRE-OPERATIVE CLEARANCE: Primary | ICD-10-CM

## 2024-05-21 PROCEDURE — RECHECK: Performed by: DIETITIAN, REGISTERED

## 2024-05-21 NOTE — PROGRESS NOTES
Bariatric Nutrition Assessment Note    Preop  6 Month Program- Level 2 revision   6/6 Today     Type of surgery  Vertical sleeve gastrectomy  Surgery Date: 4/1/2014  9 1/2 years  post-op  Surgeon: Dr. Jeff Ruiz    Nutrition Assessment   Estefania Avila  58 y.o.  female  Wt 103 kg (226 lb 4.8 oz)   BMI 38.84 kg/m²      Pt is on 7.5 mg of Zepbound - has been unable to obtain for 2 weeks, waiting for it to come into pharmacy  Will also call around today to find out what pharmacies have it in stock.   Reports that this has helped her lose weight   -10# x 6 months (4% of TBW)      Wt Readings from Last 3 Encounters:   03/27/24 103 kg (226 lb 11.2 oz)   03/19/24 105 kg (231 lb)   03/01/24 105 kg (231 lb 12.8 oz)        Eduardo Ocampo Equation:  1657  Estimated calories for weight maintenance:  1988  ( sedentary  )   Estimated calories for weight loss 988-1488 ( 1-2# per wk wt loss - sedentary )  Estimated protein needs  g/d (1.0-1.5 gms/kg IBW )   Estimated fluid needs 69-81 oz/ d (30-35 ml/kg IBW )      Weight on Day of Weight Loss Surgery: 272#  Weight in (lb) to have BMI = 25: 151.9#  Pancho Wt: 160 lbs per pt report  Was able to maintain 180-190 # for years  S/P Covid infection gained weight and has been unable to lose it   Post-Op Wt Loss: 43.4#/ 34% EBWL in 9 1/2  year(s)    Review of History and Medications   Past Medical History:   Diagnosis Date    Abnormal Pap smear of cervix 05/2016 5/2016 LGSIL, + other HPV; 6/2016 Colpo JANA 1. 12/2021 pap neg/ HPV neg.    Anemia     DVT (deep venous thrombosis) (HCC)     Factor V Leiden mutation (HCC)     GERD (gastroesophageal reflux disease)     Obesity, Class II, BMI 35-39.9     PONV (postoperative nausea and vomiting)     Pulmonary embolism (HCC)     S/P gastric surgery     gastric sleeve     Sleep apnea     wears c-pap, Last assessed: 3/7/14     Past Surgical History:   Procedure Laterality Date    BARIATRIC SURGERY      CHOLECYSTECTOMY      COLONOSCOPY       complete, resolved: 1996    EGD AND COLONOSCOPY N/A 03/08/2018    Procedure: EGD AND COLONOSCOPY;  Surgeon: Joy Elias MD;  Location: [facility];  Service: Gastroenterology    GALLBLADDER SURGERY      IVC FILTER INSERTION  04/01/2014    Radiologic Supervision: Felicitas Filter Placement in IVC    MOUTH SURGERY      STOMACH SURGERY  04/01/2014    sleeve, Gastric surgery for Morbid Obesity Laparoscopic Longitudinal Gastrectomy, per allscripts    UPPER GASTROINTESTINAL ENDOSCOPY       Social History     Socioeconomic History    Marital status: /Civil Union     Spouse name: Not on file    Number of children: 1    Years of education: Not on file    Highest education level: Not on file   Occupational History    Not on file   Tobacco Use    Smoking status: Never     Passive exposure: Never    Smokeless tobacco: Never    Tobacco comments:     current non-smoker, per allscripts   Vaping Use    Vaping status: Never Used   Substance and Sexual Activity    Alcohol use: Yes     Alcohol/week: 2.0 standard drinks of alcohol     Types: 2 Standard drinks or equivalent per week     Comment: Socially    Drug use: No    Sexual activity: Yes     Partners: Male     Birth control/protection: I.U.D.   Other Topics Concern    Not on file   Social History Narrative    , per allscripts    Exercise frequency (times/week)     Social Determinants of Health     Financial Resource Strain: Not on file   Food Insecurity: Not on file   Transportation Needs: Not on file   Physical Activity: Not on file   Stress: Not on file   Social Connections: Not on file   Intimate Partner Violence: Not on file   Housing Stability: Not on file       Current Outpatient Medications:     ferrous sulfate 325 (65 Fe) mg tablet, Take 325 mg by mouth daily with breakfast, Disp: , Rfl:     levonorgestrel (MIRENA) 20 MCG/24HR IUD, 1 each by Intrauterine route once, Disp: , Rfl:     Multiple Vitamin (MULTIVITAMIN) capsule, Take 1 capsule by mouth  daily, Disp: , Rfl:     tirzepatide (Zepbound) 7.5 mg/0.5 mL auto-injector, Inject 0.5 mL (7.5 mg total) under the skin once a week for 28 days, Disp: 2 mL, Rfl: 0    Food Intake and Lifestyle Assessment   Food Intake Assessment completed via usual diet recall  Breakfast: 9 am / 9:30   Coffee at home- with low sugar creamer 60 calories ( 4 Tbsp )   Egg bites or cottage cheese   Snack: 0   Lunch: 12/1 pm Salad kit pre purchased ~ 300  Does not always have protein in it   Snack: 2 rice cakes with PB   Dinner: 6/6:30 pm   Could be salad with protein   Cheese tortellini (  travelingg )  Sauteed spinach and onions   Snack: 0- sometimes coke zero   Some snacking  on weekends/ day soff   Triskets with cream cheese ( chive flavored )   Cucumber black olives with vingerette and grape leaves   Out to eat will eat french fries and salad   Beverage intake: water, diet soda, and coffee/tea  Diet texture/stage: regular  Protein supplement: none currently   Estimated protein intake per day: ~40 grams   Estimated fluid intake per day: 68 -70 oz per day   Meals eaten away from home: once per week   Typical meal pattern: 3 meals per day and 2 snacks per day  Eating Behaviors: Appropriate diet advancement, Appropriate portion sizes, Does not drink with meals and waits 30-minutes after meal before resuming drinking, and Mindless eating    Vitamin and Mineral regimen: multivitamin and mineral, plus additional iron supplement   IUD has stopped excessive menstrual flow   Currently not taking calcium or extra vitamin D    Food allergies or intolerances: fried foods cause nausea/ regurgitation   Cultural or Pentecostal considerations: n/a     Physical Assessment  Nutrition Related Findings  Constipation, Loss of Hunger, and high fat foods causes regurgitation   Pt reports that zepbound has resolved her constipation.  She is currently having 1 BM per day     Physical Activity  Types of exercise: Walking  Yoga- twice per week   Current  "physical limitations: joint pain     Psychosocial Assessment   Support systems: spouse( wife)   Socioeconomic factors: works full time     Nutrition Diagnosis- continued   Diagnosis: Overweight / Obesity (NC-3.3)  Related to: Limited adherence to nutrition-related recommendations and Physical inactivity  As Evidenced by: BMI >25 and Unintentional weight gain     Nutrition Prescription: Recommend the following diet  1200 -1400 calories/ 75 -85 grams protein   80 oz/ fluid per day     Meal Plan ( Navneet/Pro/Carb)   Breakfast: 200/15/30  Snack: 150/>5/15  Lunch: 300/25/30  Snack: 150/>5/15  Dinner: 300/25/30  Snack: 150/>5/15      Interventions and Teaching   Patient educated on post-op nutrition guidelines.       Patient educated and handouts provided.  Surgical changes to stomach / GI  Capacity of post-surgery stomach  Diet progression  Adequate hydration  Sugar and fat restriction to decrease \"dumping syndrome\"  Fat restriction to decrease steatorrhea  Expected weight loss  Weight loss plateaus/ possibility of weight regain  Exercise  Suggestions for pre-op diet  Nutrition considerations after surgery  Protein supplements  Meal planning and preparation  Appropriate carbohydrate, protein, and fat intake, and food/fluid choices to maximize safe weight loss, nutrient intake, and tolerance   Dietary and lifestyle changes  Possible problems with poor eating habits  Intuitive eating  Techniques for self monitoring and keeping daily food journal  Potential for food intolerance after surgery, and ways to deal with them including: lactose intolerance, nausea, reflux, vomiting, diarrhea, food intolerance, appetite changes, gas  Vitamin / Mineral supplementation of Multivitamin with minerals, Calcium, Vitamin B12, Iron, Fat Soluble vitamins, and Vitamin D  Patient is not currently pregnant and doesn't desire to become pregnant a minimum of one year post-op- pt is menopausal, has IUD to control monthly bleeding.     Reviewed non " animal based protein sources and lacto ovo protein sources to increase protein in diet     Education provided to: patient    Barriers to learning: No barriers identified    Readiness to change: preparation    Comprehension: needs reinforcement and verbalizes understanding     Expected Compliance: good    Recommendations  Pt is an appropriate candidate for surgery. Yes      Evaluation/Monitoring   Eating pattern as discussed Tolerance of nutrition prescription Body weight Lab values Physical activity Bowel pattern  Pt has returned to food logging on MFP and finds it helpful.   She is having a good response to zepbound .  She has a decrease in bloating and her constipation is resolved.  She was unable to get her prescription filled as [facility] ( 2 weeks )  She will call around today and let her provider know to switch pharmacies.  She is changing jobs and since she is having a good response with medication, would like to continue with the MWM program and halt the surgical process.     Goals- continued   Eliminate sugar sweetened beverages, Food journal, Eat 3 meals per day, Eliminate mindless snacking, and include protein at each meal   Food log MFP 1200 -1400 calories per day    Do not go below 800 calories while taking zepbound   Include protein at each meal   Follow with MWM provider.       Time Spent:   30 minutes

## 2024-06-07 ENCOUNTER — OFFICE VISIT (OUTPATIENT)
Dept: BARIATRICS | Facility: CLINIC | Age: 59
End: 2024-06-07
Payer: COMMERCIAL

## 2024-06-07 VITALS
HEART RATE: 82 BPM | BODY MASS INDEX: 38.1 KG/M2 | HEIGHT: 64 IN | RESPIRATION RATE: 16 BRPM | TEMPERATURE: 96.2 F | SYSTOLIC BLOOD PRESSURE: 128 MMHG | WEIGHT: 223.2 LBS | DIASTOLIC BLOOD PRESSURE: 62 MMHG

## 2024-06-07 DIAGNOSIS — E66.9 OBESITY, CLASS II, BMI 35-39.9: Primary | ICD-10-CM

## 2024-06-07 PROCEDURE — 99213 OFFICE O/P EST LOW 20 MIN: CPT | Performed by: INTERNAL MEDICINE

## 2024-06-07 RX ORDER — TIRZEPATIDE 10 MG/.5ML
10 INJECTION, SOLUTION SUBCUTANEOUS WEEKLY
Qty: 6 ML | Refills: 0 | Status: SHIPPED | OUTPATIENT
Start: 2024-06-07 | End: 2024-08-30

## 2024-06-07 RX ORDER — TIRZEPATIDE 7.5 MG/.5ML
INJECTION, SOLUTION SUBCUTANEOUS
COMMUNITY
Start: 2024-05-21 | End: 2024-06-07

## 2024-06-07 NOTE — PROGRESS NOTES
Assessment/Plan:  Estefania was seen today for follow-up.    Diagnoses and all orders for this visit:    Obesity, Class II, BMI 35-39.9  -     tirzepatide (Zepbound) 10 mg/0.5 mL auto-injector; Inject 0.5 mL (10 mg total) under the skin once a week       Initial: 233 lbs  Current:  223 lbs  Change: -10 lbs  Goal: 165-170 lbs    - Weight not at goal  - Patient is interested in Conservative Program  - Labs reviewed: As below.    General Recommendations:  Nutrition:  Eat breakfast daily.  Do not skip meals.     Food log (ie.) www.myfitnesspal.com, sparkpeople.com, loseit.com, calorieking.com, etc.    Practice mindful eating.  Be sure to set aside time to eat, eat slowly, and savor your food.    Hydration:    At least 64oz of water daily.  No sugar sweetened beverages.  No juice (eat the fruit instead).    Exercise:  Studies have shown that the ideal exercise goal is somewhere between 150 to 300 minutes of moderate intensity exercise a week.  Start with exercising 10 minutes every other day and gradually increase physical activity with a goal of at least 150 minutes of moderate intensity exercise a week, divided over at least 3 days a week.  An example of this would be exercising 30 minutes a day, 5 days a week.  Resistance training can increase muscle mass and increase our resting metabolic rate.   FULL BODY resistance training is recommended 2-3 times a week.  Do not do this on consecutive days to allow for muscle recovery.    Aim for a bare minimum 5000 steps, even on days you do not exercise.    Monitoring:   Weigh yourself daily.  If this causes undue stress, then just weigh yourself once a week.  Weigh yourself the same time of the day with the same amount of clothing on.  Preferably this should be done after waking up, before you eat, and with no clothing or minimal clothing on.    Specific Goals:  Food log (ie.) www.myfitnesspal.com,sparkpeople.com,loseit.com,calorieking.com,etc.   No sugary beverages. At least 64oz  "of water daily.  Gradually increase physical activity to a goal of 5 days per week for 30 minutes of MODERATE intensity PLUS 2 days per week of FULL BODY resistance training    Increase Zepbound to 10mg weekly.    Nurse visit in 2 months    Return visit:  4 months      Total time spent reviewing chart, interviewing patient, examining patient, discussing plan, answering all questions, and documentin min.       ______________________________________________________________________    Subjective:   Chief Complaint   Patient presents with    Follow-up     [facility]- [facility] F[facility]u     Patient here to discuss weight associated problems and nutrition goals  HPI: Estefania Avila  is a 58 y.o. female with history of sleeve gastrectomy by Dr. Ruiz 2014 and excess weight.  Weight loss plan:  Conservative Program.   Most recent notes and records were reviewed.    Wt Readings from Last 10 Encounters:   24 101 kg (223 lb 3.2 oz)   24 103 kg (226 lb 4.8 oz)   24 103 kg (226 lb 11.2 oz)   24 105 kg (231 lb)   24 105 kg (231 lb 12.8 oz)   24 106 kg (233 lb 9.6 oz)   24 106 kg (233 lb)   24 106 kg (233 lb)   24 104 kg (230 lb)   24 106 kg (233 lb)     Presurgery weight 289.  Weight on Day of Weight Loss Surgery: 272   Weight in (lb) to have BMI = 25: 151.9#  Pancho Wt: 160 lbs per pt report  Was able to maintain 180-190 for years  Previous office visit/Zepbound start weight 233  Currently 223      Hunger/Cravings: Reduced  Dining out: twice a week, splits portion with   Hydration:  Water \"easily over\" 64oz  Alcohol:  Very limited  Exercise:  Walks daily for 30 min, Yoga 2x/w    + cold intolerenace at night.    Due for iron and thyroid test.      Patient denies personal and family history of  pancreatitis, thyroid cancer, MEN-2 tumors.      Review Of Systems:  Review of Systems    Objective:  /62   Pulse 82   Temp (!) 96.2 °F (35.7 °C)   Resp 16   Ht 5' 4\" " "(1.626 m)   Wt 101 kg (223 lb 3.2 oz)   BMI 38.31 kg/m²   Physical Exam    Labs and Imaging  Recent labs and imaging have been personally reviewed.  Lab Results   Component Value Date    WBC 6.3 12/08/2023    HGB 15.2 12/08/2023    HCT 44.2 12/08/2023    MCV 86.7 12/08/2023     12/08/2023     Lab Results   Component Value Date     04/03/2014    SODIUM 139 12/08/2023    K 4.4 12/08/2023     12/08/2023    CO2 27 12/08/2023    ANIONGAP 2 (L) 04/03/2014    AGAP 9 02/11/2020    BUN 18 12/08/2023    CREATININE 0.70 12/08/2023    GLUC 101 (H) 12/08/2023    CALCIUM 9.7 12/08/2023    AST 13 12/08/2023    ALT 17 12/08/2023    ALKPHOS 86 12/08/2023    TP 7.4 12/08/2023    TBILI 0.7 12/08/2023    EGFR 101 12/08/2023     No results found for: \"HGBA1C\"  Lab Results   Component Value Date    TSH 1.50 03/18/2019     Lab Results   Component Value Date    CHOLESTEROL 241 (H) 12/08/2023     Lab Results   Component Value Date    HDL 53 12/08/2023     Lab Results   Component Value Date    TRIG 192 (H) 12/08/2023     Lab Results   Component Value Date    LDLCALC 155 (H) 12/08/2023      "

## 2024-06-07 NOTE — PATIENT INSTRUCTIONS
Initial: 233 lbs  Current:  223 lbs  Change: -10 lbs  Goal: 165-170 lbs    - Weight not at goal  - Patient is interested in Conservative Program  - Labs reviewed: As below.    General Recommendations:  Nutrition:  Eat breakfast daily.  Do not skip meals.     Food log (ie.) www.myfitnesspal.com, sparkpeople.com, loseit.com, calorieking.com, etc.    Practice mindful eating.  Be sure to set aside time to eat, eat slowly, and savor your food.    Hydration:    At least 64oz of water daily.  No sugar sweetened beverages.  No juice (eat the fruit instead).    Exercise:  Studies have shown that the ideal exercise goal is somewhere between 150 to 300 minutes of moderate intensity exercise a week.  Start with exercising 10 minutes every other day and gradually increase physical activity with a goal of at least 150 minutes of moderate intensity exercise a week, divided over at least 3 days a week.  An example of this would be exercising 30 minutes a day, 5 days a week.  Resistance training can increase muscle mass and increase our resting metabolic rate.   FULL BODY resistance training is recommended 2-3 times a week.  Do not do this on consecutive days to allow for muscle recovery.    Aim for a bare minimum 5000 steps, even on days you do not exercise.    Monitoring:   Weigh yourself daily.  If this causes undue stress, then just weigh yourself once a week.  Weigh yourself the same time of the day with the same amount of clothing on.  Preferably this should be done after waking up, before you eat, and with no clothing or minimal clothing on.    Specific Goals:  Food log (ie.) www.[employer].com,sparkpeople.com,loseit.com,calorieking.com,etc.   No sugary beverages. At least 64oz of water daily.  Gradually increase physical activity to a goal of 5 days per week for 30 minutes of MODERATE intensity PLUS 2 days per week of FULL BODY resistance training    Increase Zepbound to 10mg weekly.    Nurse visit in 2 months    Return  visit:  4 months

## 2024-06-17 DIAGNOSIS — E66.9 OBESITY, CLASS II, BMI 35-39.9: Primary | ICD-10-CM

## 2024-06-17 RX ORDER — TIRZEPATIDE 7.5 MG/.5ML
7.5 INJECTION, SOLUTION SUBCUTANEOUS WEEKLY
Qty: 6 ML | Refills: 0 | Status: SHIPPED | OUTPATIENT
Start: 2024-06-17 | End: 2024-09-09

## 2024-06-18 ENCOUNTER — OFFICE VISIT (OUTPATIENT)
Dept: OBGYN CLINIC | Facility: CLINIC | Age: 59
End: 2024-06-18
Payer: COMMERCIAL

## 2024-06-18 VITALS
HEIGHT: 64 IN | BODY MASS INDEX: 38.07 KG/M2 | WEIGHT: 223 LBS | DIASTOLIC BLOOD PRESSURE: 82 MMHG | SYSTOLIC BLOOD PRESSURE: 118 MMHG

## 2024-06-18 DIAGNOSIS — M17.11 PRIMARY OSTEOARTHRITIS OF RIGHT KNEE: Primary | ICD-10-CM

## 2024-06-18 PROCEDURE — 20610 DRAIN/INJ JOINT/BURSA W/O US: CPT | Performed by: PHYSICIAN ASSISTANT

## 2024-06-18 PROCEDURE — 99213 OFFICE O/P EST LOW 20 MIN: CPT | Performed by: PHYSICIAN ASSISTANT

## 2024-06-18 RX ORDER — BUPIVACAINE HYDROCHLORIDE 2.5 MG/ML
2 INJECTION, SOLUTION INFILTRATION; PERINEURAL
Status: COMPLETED | OUTPATIENT
Start: 2024-06-18 | End: 2024-06-18

## 2024-06-18 RX ORDER — LIDOCAINE HYDROCHLORIDE 10 MG/ML
2 INJECTION, SOLUTION INFILTRATION; PERINEURAL
Status: COMPLETED | OUTPATIENT
Start: 2024-06-18 | End: 2024-06-18

## 2024-06-18 RX ORDER — BETAMETHASONE SODIUM PHOSPHATE AND BETAMETHASONE ACETATE 3; 3 MG/ML; MG/ML
6 INJECTION, SUSPENSION INTRA-ARTICULAR; INTRALESIONAL; INTRAMUSCULAR; SOFT TISSUE
Status: COMPLETED | OUTPATIENT
Start: 2024-06-18 | End: 2024-06-18

## 2024-06-18 RX ADMIN — LIDOCAINE HYDROCHLORIDE 2 ML: 10 INJECTION, SOLUTION INFILTRATION; PERINEURAL at 09:00

## 2024-06-18 RX ADMIN — BETAMETHASONE SODIUM PHOSPHATE AND BETAMETHASONE ACETATE 6 MG: 3; 3 INJECTION, SUSPENSION INTRA-ARTICULAR; INTRALESIONAL; INTRAMUSCULAR; SOFT TISSUE at 09:00

## 2024-06-18 RX ADMIN — BUPIVACAINE HYDROCHLORIDE 2 ML: 2.5 INJECTION, SOLUTION INFILTRATION; PERINEURAL at 09:00

## 2024-06-18 NOTE — PROGRESS NOTES
"Patient Name:  Estefania Avila  MRN:  [id number]    Assessment & Plan     Right knee DJD.  Corticosteroid injection performed today into the right knee joint.  Continue Tylenol and Aleve as needed.  Activities as tolerated with modification avoid pain.  Follow-up in 3 months for repeat evaluation and repeat intra-articular corticoid injection at that time as indicated.    Chief Complaint     Follow-up right knee DJD.    History of the Present Illness     Estefania Avila is a 58 y.o. female who returns to the office today for follow-up regarding her right knee DJD.  She was last seen on 3/19/2024.  At that time she received an intra-articular corticosteroid injection into the right knee.  She noted significant treatment up until approximately 1 week ago when she notes a gradual return of her pain.  She currently notes generalized right knee pain rated 4 out of 10 in intensity.  Pain is worse with increased activity and prolonged weightbearing.  She denies any swelling but does note stiffness.  No weakness or instability.  She does take Tylenol and Aleve with relief.  No numbness or tingling.  No fevers or chills.  She is interested in a repeat intra-articular corticosteroid injection today.    Physical Exam     Ht 5' 4\" (1.626 m)   Wt 101 kg (223 lb)   BMI 38.28 kg/m²     Right knee: No gross deformity. Skin intact. No erythema ecchymosis or swelling. No significant effusion. No joint line tenderness. Range of motion 0 to 110 degrees with slight discomfort at terminal flexion. Crepitation is appreciated with passive range of motion. Stable to varus and valgus stress without pain. Stable Lachman test. Negative posterior drawer test. Negative Moreno's test. Extensor mechanism is intact.     Eyes: Anicteric sclerae.  ENT: Trachea midline.  Lungs: Normal respiratory effort.  CV: Capillary refill is less than 2 seconds.  Skin: Intact without erythema.  Lymph: No palpable lymphadenopathy.  Neuro: Sensation is grossly intact " to light touch.  Psych: Mood and affect are appropriate.    Past Medical History:   Diagnosis Date    Abnormal Pap smear of cervix 05/2016 5/2016 LGSIL, + other HPV; 6/2016 Colpo JANA 1. 12/2021 pap neg/ HPV neg.    Anemia     DVT (deep venous thrombosis) (HCC)     Factor V Leiden mutation (HCC)     GERD (gastroesophageal reflux disease)     Obesity, Class II, BMI 35-39.9     PONV (postoperative nausea and vomiting)     Pulmonary embolism (HCC)     S/P gastric surgery     gastric sleeve     Sleep apnea     wears c-pap, Last assessed: 3/7/14       Past Surgical History:   Procedure Laterality Date    BARIATRIC SURGERY      CHOLECYSTECTOMY      COLONOSCOPY      complete, resolved: 1996    EGD AND COLONOSCOPY N/A 03/08/2018    Procedure: EGD AND COLONOSCOPY;  Surgeon: Joy Elias MD;  Location: [facility];  Service: Gastroenterology    GALLBLADDER SURGERY      IVC FILTER INSERTION  04/01/2014    Radiologic Supervision: Cherry Log Filter Placement in IVC    MOUTH SURGERY      STOMACH SURGERY  04/01/2014    sleeve, Gastric surgery for Morbid Obesity Laparoscopic Longitudinal Gastrectomy, per allscripts    UPPER GASTROINTESTINAL ENDOSCOPY         Allergies   Allergen Reactions    Pseudoephedrine Tachycardia    Latex Rash       Current Outpatient Medications on File Prior to Visit   Medication Sig Dispense Refill    ferrous sulfate 325 (65 Fe) mg tablet Take 325 mg by mouth daily with breakfast      levonorgestrel (MIRENA) 20 MCG/24HR IUD 1 each by Intrauterine route once      Multiple Vitamin (MULTIVITAMIN) capsule Take 1 capsule by mouth daily      tirzepatide (Zepbound) 7.5 mg/0.5 mL auto-injector Inject 0.5 mL (7.5 mg total) under the skin once a week 6 mL 0     No current facility-administered medications on file prior to visit.       Social History     Tobacco Use    Smoking status: Never     Passive exposure: Never    Smokeless tobacco: Never    Tobacco comments:     current non-smoker, per allscripts    Vaping Use    Vaping status: Never Used   Substance Use Topics    Alcohol use: Yes     Alcohol/week: 2.0 standard drinks of alcohol     Types: 2 Standard drinks or equivalent per week     Comment: Socially    Drug use: No       Family History   Problem Relation Age of Onset    Osteoporosis Mother     Diabetes Father     Heart disease Father     Arthritis Father     Heart failure Father     Hypertension Father     Ovarian cancer Maternal Grandmother 82    No Known Problems Maternal Grandfather     No Known Problems Paternal Grandmother     No Known Problems Paternal Grandfather     No Known Problems Paternal Aunt     No Known Problems Paternal Aunt     No Known Problems Paternal Aunt     No Known Problems Paternal Aunt     Colon cancer Neg Hx     Breast cancer Neg Hx     Uterine cancer Neg Hx        Review of Systems     As stated in the HPI. All other systems reviewed and are negative.      Large joint arthrocentesis: R knee  Procedure Details  Location: knee - R knee  Needle size: 22 G  Ultrasound guidance: no  Approach: anterolateral  Medications administered: 2 mL bupivacaine 0.25 %; 2 mL lidocaine 1 %; 6 mg betamethasone acetate-betamethasone sodium phosphate 6 (3-3) mg/mL    Patient tolerance: patient tolerated the procedure well with no immediate complications  Dressing:  Sterile dressing applied

## 2024-07-18 ENCOUNTER — TELEPHONE (OUTPATIENT)
Age: 59
End: 2024-07-18

## 2024-07-18 NOTE — TELEPHONE ENCOUNTER
Pt has new ins,[employer], which I updated on her chart.  Pt is requesting we send a PA to her new ins for her Zepbound

## 2024-07-22 NOTE — TELEPHONE ENCOUNTER
Called pharmacy and medication was dispensed on 6/17/24 for Zepbound 7.5mg from script that was placed in 6/17/24. Pharmacy will need a new script and PA for Zepbound 10mg, if higher dose script is being placed by provider. Advise.

## 2024-07-23 ENCOUNTER — OFFICE VISIT (OUTPATIENT)
Dept: BARIATRICS | Facility: CLINIC | Age: 59
End: 2024-07-23
Payer: COMMERCIAL

## 2024-07-23 ENCOUNTER — TELEPHONE (OUTPATIENT)
Dept: BARIATRICS | Facility: CLINIC | Age: 59
End: 2024-07-23

## 2024-07-23 VITALS
BODY MASS INDEX: 37.3 KG/M2 | HEART RATE: 80 BPM | HEIGHT: 64 IN | SYSTOLIC BLOOD PRESSURE: 118 MMHG | RESPIRATION RATE: 16 BRPM | DIASTOLIC BLOOD PRESSURE: 86 MMHG | TEMPERATURE: 98 F | OXYGEN SATURATION: 98 % | WEIGHT: 218.5 LBS

## 2024-07-23 DIAGNOSIS — Z98.84 BARIATRIC SURGERY STATUS: ICD-10-CM

## 2024-07-23 DIAGNOSIS — D68.51 FACTOR V LEIDEN (HCC): ICD-10-CM

## 2024-07-23 DIAGNOSIS — Z86.718 HISTORY OF DVT (DEEP VEIN THROMBOSIS): ICD-10-CM

## 2024-07-23 DIAGNOSIS — K91.2 POSTSURGICAL MALABSORPTION: ICD-10-CM

## 2024-07-23 DIAGNOSIS — Z48.815 ENCOUNTER FOR SURGICAL AFTERCARE FOLLOWING SURGERY OF DIGESTIVE SYSTEM: Primary | ICD-10-CM

## 2024-07-23 DIAGNOSIS — E66.9 OBESITY, CLASS II, BMI 35-39.9: ICD-10-CM

## 2024-07-23 PROCEDURE — 99213 OFFICE O/P EST LOW 20 MIN: CPT | Performed by: PHYSICIAN ASSISTANT

## 2024-07-23 NOTE — PROGRESS NOTES
Date of surgery: 4/1/2014  Procedure: sleeve  Performing surgeon: Dr. Ruiz     Initial Weight - 278.0 lbs  Current Weight - 218.5 lbs   Total Body Weight Loss (EWL)- 59.5  EWL% - 47%  TWB % - 21%

## 2024-07-23 NOTE — PROGRESS NOTES
Assessment/Plan:     Patient ID: Estefania Avila is a 58 y.o. female.     Bariatric Surgery Status    -s/p Vertical Sleeve Gastrectomy with Dr. Ruiz on 4/1/2014  here for annual doing very well   On zepbound per our [facility] team and doing well with weight loss  She is UTD on screening EGD 10/2023 which was unremarkable     Continued/Maintain healthy weight loss with good nutrition intakes.  Adequate hydration with at least 64oz. fluid intake.  Follow diet as discussed.  Follow vitamin and mineral recommendations as reviewed with you.  Exercise as tolerated.    Colonoscopy referral made: utd  Mammo: UTD     Follow-up in 1 year. We kindly ask that your arrive 15 minutes before your scheduled appointment time with your provider to allow our staff to room you, get your vital signs and update your chart.    Get lab work done prior to annual visit. Please call the office if you need a script.  It is recommended to check with your insurance BEFORE getting labs done to make sure they are covered by your policy.      Call our office if you have any problems with abdominal pain especially associated with fever, chills, nausea, vomiting or any other concerns.    All  Post-bariatric surgery patients should be aware that very small quantities of any alcohol can cause impairment and it is very possible not to feel the effect. The effect can be in the system for several hours.  It is also a stomach irritant.     It is advised to AVOID alcohol, Nonsteroidal antiinflammatory drugs (NSAIDS) and nicotine of all forms . Any of these can cause stomach irritation/pain.    Discussed the effects of alcohol on a bariatric patient and the increased impairment risk.     Keep up the good work!     Postsurgical Malabsorption   -At risk for malabsorption of vitamins/minerals secondary to malabsorption and restriction of intake from bariatric surgery  -Currently taking adequate postop bariatric surgery vitamin supplementation  -Last set of bariatric  labs completed 12/2023  -Next set of bariatric labs ordered for approximately 2 weeks  -Patient received education about the importance of adhering to a lifelong supplementation regimen to avoid vitamin/mineral deficiencies      Diagnoses and all orders for this visit:    Encounter for surgical aftercare following surgery of digestive system  -     Zinc; Future  -     Vitamin D 25 hydroxy; Future  -     Vitamin B1, whole blood; Future  -     PTH, intact; Future  -     Vitamin B12; Future  -     Comprehensive metabolic panel; Future  -     Folate; Future  -     TIBC Panel (incl. Iron, TIBC, % Iron Saturation); Future  -     Vitamin A; Future    Bariatric surgery status  -     Zinc; Future  -     Vitamin D 25 hydroxy; Future  -     Vitamin B1, whole blood; Future  -     PTH, intact; Future  -     Vitamin B12; Future  -     Comprehensive metabolic panel; Future  -     Folate; Future  -     TIBC Panel (incl. Iron, TIBC, % Iron Saturation); Future  -     Vitamin A; Future    Postsurgical malabsorption  -     Zinc; Future  -     Vitamin D 25 hydroxy; Future  -     Vitamin B1, whole blood; Future  -     PTH, intact; Future  -     Vitamin B12; Future  -     Comprehensive metabolic panel; Future  -     Folate; Future  -     TIBC Panel (incl. Iron, TIBC, % Iron Saturation); Future  -     Vitamin A; Future    Obesity, Class II, BMI 35-39.9  -     Zinc; Future  -     Vitamin D 25 hydroxy; Future  -     Vitamin B1, whole blood; Future  -     PTH, intact; Future  -     Vitamin B12; Future  -     Comprehensive metabolic panel; Future  -     Folate; Future  -     TIBC Panel (incl. Iron, TIBC, % Iron Saturation); Future  -     Vitamin A; Future    Factor V Leiden (HCC)  -     Zinc; Future  -     Vitamin D 25 hydroxy; Future  -     Vitamin B1, whole blood; Future  -     PTH, intact; Future  -     Vitamin B12; Future  -     Comprehensive metabolic panel; Future  -     Folate; Future  -     TIBC Panel (incl. Iron, TIBC, % Iron  "Saturation); Future  -     Vitamin A; Future    History of DVT (deep vein thrombosis)  -     Zinc; Future  -     Vitamin D 25 hydroxy; Future  -     Vitamin B1, whole blood; Future  -     PTH, intact; Future  -     Vitamin B12; Future  -     Comprehensive metabolic panel; Future  -     Folate; Future  -     TIBC Panel (incl. Iron, TIBC, % Iron Saturation); Future  -     Vitamin A; Future         Subjective:      Patient ID: Esetfania Avila is a 58 y.o. female.    Date of surgery: 4/1/2014  Procedure: sleeve  Performing surgeon: Dr. Ruiz      Initial Weight - 278.0 lbs  Current Weight - 218.5 lbs   Total Body Weight Loss (EWL)- 59.5  EWL% - 47%  TWB % - 21%      Current BMI is Body mass index is 37.51 kg/m².    Tolerating a regular diet-yes  Eating at least 60 grams of protein per day-yes  Following 30/60 minute rule with liquids-yes  Drinking at least 64 ounces of fluid per day-yes  Sufficient exercise-yes  Using NSAIDs regularly-no  Using nicotine-no  Using alcohol-social  Supplements:  MVI + b12       The following portions of the patient's history were reviewed and updated as appropriate: allergies, current medications, past family history, past medical history, past social history, past surgical history and problem list.    Review of Systems   Constitutional: Negative.    Respiratory: Negative.     Cardiovascular: Negative.    Gastrointestinal: Negative.    Neurological: Negative.    Psychiatric/Behavioral: Negative.           Objective:    /86 (BP Location: Left arm, Patient Position: Sitting, Cuff Size: Large)   Pulse 80   Temp 98 °F (36.7 °C) (Tympanic)   Resp 16   Ht 5' 4\" (1.626 m)   Wt 99.1 kg (218 lb 8 oz)   SpO2 98%   BMI 37.51 kg/m²      Physical Exam  Vitals and nursing note reviewed.   Constitutional:       Appearance: Normal appearance. She is obese.   HENT:      Head: Normocephalic and atraumatic.   Eyes:      Extraocular Movements: Extraocular movements intact.      Pupils: Pupils are " equal, round, and reactive to light.   Cardiovascular:      Rate and Rhythm: Normal rate.   Pulmonary:      Effort: Pulmonary effort is normal.   Musculoskeletal:         General: Normal range of motion.      Cervical back: Normal range of motion.   Skin:     General: Skin is warm and dry.   Neurological:      General: No focal deficit present.      Mental Status: She is alert and oriented to person, place, and time.   Psychiatric:         Mood and Affect: Mood normal.

## 2024-08-07 ENCOUNTER — CLINICAL SUPPORT (OUTPATIENT)
Dept: BARIATRICS | Facility: CLINIC | Age: 59
End: 2024-08-07

## 2024-08-07 VITALS
TEMPERATURE: 98.6 F | SYSTOLIC BLOOD PRESSURE: 124 MMHG | RESPIRATION RATE: 16 BRPM | DIASTOLIC BLOOD PRESSURE: 70 MMHG | WEIGHT: 217 LBS | HEART RATE: 82 BPM | BODY MASS INDEX: 37.05 KG/M2 | HEIGHT: 64 IN

## 2024-08-07 DIAGNOSIS — R63.5 ABNORMAL WEIGHT GAIN: Primary | ICD-10-CM

## 2024-08-07 DIAGNOSIS — E66.9 OBESITY, CLASS II, BMI 35-39.9: Primary | ICD-10-CM

## 2024-08-07 PROCEDURE — RECHECK

## 2024-08-07 RX ORDER — TIRZEPATIDE 10 MG/.5ML
10 INJECTION, SOLUTION SUBCUTANEOUS WEEKLY
Qty: 2 ML | Refills: 0 | Status: SHIPPED | OUTPATIENT
Start: 2024-08-07 | End: 2024-09-04

## 2024-08-07 NOTE — PROGRESS NOTES
Patient last visit weight:218.0 lb  Patient current visit weight: 217.0 lb    If you are taking phentermine or other oral weight loss medications, are you experiencing any of the following symptoms:  Headache:   Blurred Vision:   Chest Pain:   Palpitations:  Insomnia:   SPECIFY ORAL MEDICATION AND DOSAGE:     If you are taking an injectable medication,  are you experiencing any of the following symptoms:  Bloating: NO  Nausea:NO  Vomiting: NO  Constipation: NO  Diarrhea:NO  SPECIFY INJECTABLE MEDICATION AND CURRENT DOSAGE: ZEPBOUND 7.5MG- PATIENT REQUESTING A DOSE INCREASE- PATIENT TOLERATED PREVIOUS DOSE WITHOUT ANY INCIDENT      Vitals:    Is BP less than 100/60?NO  Is BP greater than 140/90?NO  Is HR greater than 100?NO  **If yes to any of the above, have patient relax and repeat in 5-10 minutes**    Repeat values:    Is BP less than 100/60?  Is BP greater than 140/90?  Is HR greater than 100?  **If values remain outside of ranges above, please consult provider for next steps**

## 2024-09-05 DIAGNOSIS — E66.9 OBESITY, CLASS II, BMI 35-39.9: ICD-10-CM

## 2024-09-05 NOTE — TELEPHONE ENCOUNTER
Reason for call:   [x] Refill   [] Prior Auth  [] Other:     Office:   [] PCP/Provider -   [x] Specialty/Provider - [facility]    Medication:       Does the patient have enough for 3 days?   [x] Yes   [] No - Send as HP to POD      How are you tolerating the medication?   [] Nausea  [] Vomiting  [] Diarrhea  [x] Asymptomatic     Would you like an increase in your dose?  [] Yes   [x] No

## 2024-09-06 RX ORDER — TIRZEPATIDE 10 MG/.5ML
10 INJECTION, SOLUTION SUBCUTANEOUS WEEKLY
Qty: 2 ML | Refills: 3 | Status: SHIPPED | OUTPATIENT
Start: 2024-09-06

## 2024-09-20 ENCOUNTER — OFFICE VISIT (OUTPATIENT)
Age: 59
End: 2024-09-20
Payer: COMMERCIAL

## 2024-09-20 VITALS
BODY MASS INDEX: 37.39 KG/M2 | SYSTOLIC BLOOD PRESSURE: 102 MMHG | DIASTOLIC BLOOD PRESSURE: 80 MMHG | WEIGHT: 219 LBS | HEIGHT: 64 IN

## 2024-09-20 DIAGNOSIS — M17.11 PRIMARY OSTEOARTHRITIS OF RIGHT KNEE: Primary | ICD-10-CM

## 2024-09-20 PROCEDURE — 99213 OFFICE O/P EST LOW 20 MIN: CPT | Performed by: PHYSICIAN ASSISTANT

## 2024-09-20 PROCEDURE — 20610 DRAIN/INJ JOINT/BURSA W/O US: CPT | Performed by: PHYSICIAN ASSISTANT

## 2024-09-20 RX ORDER — BETAMETHASONE SODIUM PHOSPHATE AND BETAMETHASONE ACETATE 3; 3 MG/ML; MG/ML
6 INJECTION, SUSPENSION INTRA-ARTICULAR; INTRALESIONAL; INTRAMUSCULAR; SOFT TISSUE
Status: COMPLETED | OUTPATIENT
Start: 2024-09-20 | End: 2024-09-20

## 2024-09-20 RX ORDER — ROPIVACAINE HYDROCHLORIDE 2 MG/ML
4 INJECTION, SOLUTION EPIDURAL; INFILTRATION; PERINEURAL
Status: COMPLETED | OUTPATIENT
Start: 2024-09-20 | End: 2024-09-20

## 2024-09-20 RX ADMIN — ROPIVACAINE HYDROCHLORIDE 4 ML: 2 INJECTION, SOLUTION EPIDURAL; INFILTRATION; PERINEURAL at 08:00

## 2024-09-20 RX ADMIN — BETAMETHASONE SODIUM PHOSPHATE AND BETAMETHASONE ACETATE 6 MG: 3; 3 INJECTION, SUSPENSION INTRA-ARTICULAR; INTRALESIONAL; INTRAMUSCULAR; SOFT TISSUE at 08:00

## 2024-09-20 NOTE — PROGRESS NOTES
"Patient Name:  Estefania Avila  MRN:  [id number]    Assessment & Plan     Right knee DJD.  Patient was offered and accepted a right knee intra-articular corticosteroid injection today.  Continue Tylenol as needed for pain.  Activities as tolerated with modification avoid pain.  Follow-up in 3 months for repeat evaluation and repeat injection at that time as indicated.    Chief Complaint     Follow-up right knee DJD.    History of the Present Illness     Estefania Avila is a 58 y.o. female who returns to the office today for follow-up regarding her right knee DJD.  She was last seen on 6/18/2024.  At that time she received a right knee intra-articular corticosteroid injection.  She noted significant improvement after receiving the injection and notes a gradual return of her pain recently over the past week or so.  She denies any recent injury or trauma.  Currently her pain is 3-4 out of 10 intensity.  Pain can increase to 6-7 out of 10 intensity with prolonged standing and ambulating.  She notes minimal swelling and stiffness.  She denies any weakness or instability.  She currently takes Tylenol.  No numbness or tingling.  No fevers or chills.    Physical Exam     /80   Ht 5' 4\" (1.626 m)   Wt 99.3 kg (219 lb)   BMI 37.59 kg/m²     Right knee: No gross deformity. Skin intact. No erythema ecchymosis or swelling. No significant effusion. No joint line tenderness. Range of motion 0 to 110 degrees with slight discomfort at terminal flexion. Crepitation is appreciated with passive range of motion. Stable to varus and valgus stress without pain. Stable Lachman test. Negative posterior drawer test. Negative Lionel's test. Extensor mechanism is intact.     Eyes: Anicteric sclerae.  ENT: Trachea midline.  Lungs: Normal respiratory effort.  CV: Capillary refill is less than 2 seconds.  Skin: Intact without erythema.  Lymph: No palpable lymphadenopathy.  Neuro: Sensation is grossly intact to light touch.  Psych: Mood and " affect are appropriate.      Past Medical History:   Diagnosis Date    Abnormal Pap smear of cervix 05/2016 5/2016 LGSIL, + other HPV; 6/2016 Colpo JANA 1. 12/2021 pap neg/ HPV neg.    Anemia     DVT (deep venous thrombosis) (HCC)     Factor V Leiden mutation (HCC)     GERD (gastroesophageal reflux disease)     Obesity, Class II, BMI 35-39.9     PONV (postoperative nausea and vomiting)     Pulmonary embolism (HCC)     S/P gastric surgery     gastric sleeve     Sleep apnea     wears c-pap, Last assessed: 3/7/14       Past Surgical History:   Procedure Laterality Date    BARIATRIC SURGERY      CHOLECYSTECTOMY      COLONOSCOPY      complete, resolved: 1996    EGD AND COLONOSCOPY N/A 03/08/2018    Procedure: EGD AND COLONOSCOPY;  Surgeon: Joy Elias MD;  Location: [facility];  Service: Gastroenterology    GALLBLADDER SURGERY      IVC FILTER INSERTION  04/01/2014    Radiologic Supervision: [place] Filter Placement in IVC    MOUTH SURGERY      STOMACH SURGERY  04/01/2014    sleeve, Gastric surgery for Morbid Obesity Laparoscopic Longitudinal Gastrectomy, per allscripts    UPPER GASTROINTESTINAL ENDOSCOPY         Allergies   Allergen Reactions    Pseudoephedrine Tachycardia    Latex Rash       Current Outpatient Medications on File Prior to Visit   Medication Sig Dispense Refill    ferrous sulfate 325 (65 Fe) mg tablet Take 325 mg by mouth daily with breakfast      levonorgestrel (MIRENA) 20 MCG/24HR IUD 1 each by Intrauterine route once      Multiple Vitamin (MULTIVITAMIN) capsule Take 1 capsule by mouth daily      tirzepatide (Zepbound) 10 mg/0.5 mL auto-injector Inject 0.5 mL (10 mg total) under the skin once a week 2 mL 3     No current facility-administered medications on file prior to visit.       Social History     Tobacco Use    Smoking status: Never     Passive exposure: Never    Smokeless tobacco: Never    Tobacco comments:     current non-smoker, per allscripts   Vaping Use    Vaping status: Never  Used   Substance Use Topics    Alcohol use: Yes     Alcohol/week: 2.0 standard drinks of alcohol     Types: 2 Standard drinks or equivalent per week     Comment: Socially    Drug use: No       Family History   Problem Relation Age of Onset    Osteoporosis Mother     Diabetes Father     Heart disease Father     Arthritis Father     Heart failure Father     Hypertension Father     Ovarian cancer Maternal Grandmother 82    No Known Problems Maternal Grandfather     No Known Problems Paternal Grandmother     No Known Problems Paternal Grandfather     No Known Problems Paternal Aunt     No Known Problems Paternal Aunt     No Known Problems Paternal Aunt     No Known Problems Paternal Aunt     Colon cancer Neg Hx     Breast cancer Neg Hx     Uterine cancer Neg Hx        Review of Systems     As stated in the HPI. All other systems reviewed and are negative.      Large joint arthrocentesis: R knee  Procedure Details  Location: knee - R knee  Needle size: 22 G  Ultrasound guidance: no  Approach: anterolateral  Medications administered: 6 mg betamethasone acetate-betamethasone sodium phosphate 6 (3-3) mg/mL; 4 mL ropivacaine 0.2 %    Patient tolerance: patient tolerated the procedure well with no immediate complications  Dressing:  Sterile dressing applied

## 2024-10-02 DIAGNOSIS — E66.812 OBESITY, CLASS II, BMI 35-39.9: ICD-10-CM

## 2024-10-04 RX ORDER — TIRZEPATIDE 10 MG/.5ML
10 INJECTION, SOLUTION SUBCUTANEOUS WEEKLY
Qty: 2 ML | Refills: 1 | Status: SHIPPED | OUTPATIENT
Start: 2024-10-04

## 2024-10-28 DIAGNOSIS — E66.812 OBESITY, CLASS II, BMI 35-39.9: ICD-10-CM

## 2024-10-30 RX ORDER — TIRZEPATIDE 10 MG/.5ML
10 INJECTION, SOLUTION SUBCUTANEOUS WEEKLY
Qty: 2 ML | Refills: 1 | Status: SHIPPED | OUTPATIENT
Start: 2024-10-30

## 2024-12-02 NOTE — PROGRESS NOTES
Assessment & Plan  Obesity, Class II, BMI 35-39.9  -History of class III obesity, hypertension and sleep apnea.  Patient was on CPAP machine in the past and is no longer requiring.  Patient also had a history of sleeve gastrectomy in . Because of substantial weight loss, patient's comorbidities have improved and is no longer requiring antihypertensive      Continue with Zepbound 10 mg.  No side effects.  Initial weight: 233 2024  Current weight: 215  TBW loss%: 7.7%  Iron deficiency  Continue ferrous sulfate  History of sleep apnea  -Currently controlled without CPAP and improved after weight loss       Return in about 3 months (around 3/3/2025) for followup .       Total time spent reviewing chart, interviewing patient, examining patient, discussing plan, answering all questions, and documentin min. Most recent notes and records were reviewed.       ______________________________________________________________________        Subjective:     Chief Complaint   Patient presents with    Follow-up     MWM 6M Post Op Wt Gain F/u; Waist 44in       HPI: Estefania Avila  is a 59 y.o. female with past medical history of sleeve gastrectomy , iron deficiency anemia, class II obesity, hx sleep apnea and hypertension [resolved], GERD presents to clinic for follow-up.  Patient is doing well with medication and has no acute concerns.    Current Medication: Zepbound 10 mg  Medication Side effects:    Weight loss plan:  Conservative Program.       Review Of Systems:  Constitutional:  Negative for diaphoresis.   Gastrointestinal:  Negative for abdominal pain, constipation, nausea and vomiting.   Skin:  Negative for pallor.   Psychiatric/Behavioral:  Negative for agitation, behavioral problems, confusion, dysphoric mood and hallucinations.    All other systems reviewed and are negative.     Objective:  /84 (BP Location: Left arm, Patient Position: Sitting)   Pulse 78   Temp 97.8 °F (36.6 °C) (Tympanic)   Resp 16  "  Ht 5' 4\" (1.626 m)   Wt 97.6 kg (215 lb 3.2 oz)   BMI 36.94 kg/m²     Wt Readings from Last 10 Encounters:   12/03/24 97.6 kg (215 lb 3.2 oz)   09/20/24 99.3 kg (219 lb)   08/07/24 98.4 kg (217 lb)   07/23/24 99.1 kg (218 lb 8 oz)   06/18/24 101 kg (223 lb)   06/07/24 101 kg (223 lb 3.2 oz)   05/21/24 103 kg (226 lb 4.8 oz)   03/27/24 103 kg (226 lb 11.2 oz)   03/19/24 105 kg (231 lb)   03/01/24 105 kg (231 lb 12.8 oz)       Physical Exam  Constitutional:       General: No acute distress.  Well-nourished  HENT:      Head: Normocephalic and atraumatic.   Eyes:      Extraocular Movements: Extraocular movements intact.      Conjunctiva/sclera: Conjunctivae normal.      Pupils: Pupils are equal, round, and reactive to light.   Cardiovascular:      Rate and Rhythm: Normal rate.   Pulmonary:      Effort: Pulmonary effort is normal.   Neurological:      General: No focal deficit present.  AO x 3     Mental Status: Alert and oriented to person, place, and time. Mental status is at baseline.   Psychiatric:         Mood and Affect: Mood normal.         Behavior: Behavior normal.     Labs and Imaging  Recent labs and imaging have been personally reviewed.    Lab Results   Component Value Date    WBC 6.3 12/08/2023    HGB 15.2 12/08/2023    HCT 44.2 12/08/2023    MCV 86.7 12/08/2023     12/08/2023       Lab Results   Component Value Date     04/03/2014    SODIUM 139 12/08/2023    K 4.4 12/08/2023     12/08/2023    CO2 27 12/08/2023    ANIONGAP 2 (L) 04/03/2014    AGAP 9 02/11/2020    BUN 18 12/08/2023    CREATININE 0.70 12/08/2023    GLUC 101 (H) 12/08/2023    CALCIUM 9.7 12/08/2023    AST 13 12/08/2023    ALT 17 12/08/2023    ALKPHOS 86 12/08/2023    TP 7.4 12/08/2023    TBILI 0.7 12/08/2023    EGFR 101 12/08/2023     No results found for: \"HGBA1C\"    Lab Results   Component Value Date    TSH 1.50 03/18/2019       Lab Results   Component Value Date    CHOLESTEROL 241 (H) 12/08/2023       Lab Results "   Component Value Date    HDL 53 12/08/2023       Lab Results   Component Value Date    TRIG 192 (H) 12/08/2023       Lab Results   Component Value Date    LDLCALC 155 (H) 12/08/2023

## 2024-12-03 ENCOUNTER — TELEPHONE (OUTPATIENT)
Dept: OBGYN CLINIC | Facility: CLINIC | Age: 59
End: 2024-12-03

## 2024-12-03 ENCOUNTER — OFFICE VISIT (OUTPATIENT)
Dept: BARIATRICS | Facility: CLINIC | Age: 59
End: 2024-12-03
Payer: COMMERCIAL

## 2024-12-03 VITALS
DIASTOLIC BLOOD PRESSURE: 84 MMHG | SYSTOLIC BLOOD PRESSURE: 128 MMHG | RESPIRATION RATE: 16 BRPM | TEMPERATURE: 97.8 F | BODY MASS INDEX: 36.74 KG/M2 | HEIGHT: 64 IN | HEART RATE: 78 BPM | WEIGHT: 215.2 LBS

## 2024-12-03 DIAGNOSIS — E66.812 OBESITY, CLASS II, BMI 35-39.9: Primary | ICD-10-CM

## 2024-12-03 DIAGNOSIS — E61.1 IRON DEFICIENCY: ICD-10-CM

## 2024-12-03 DIAGNOSIS — G47.30 SLEEP APNEA: ICD-10-CM

## 2024-12-03 DIAGNOSIS — E66.813 CLASS 3 OBESITY: ICD-10-CM

## 2024-12-03 PROCEDURE — 99213 OFFICE O/P EST LOW 20 MIN: CPT | Performed by: STUDENT IN AN ORGANIZED HEALTH CARE EDUCATION/TRAINING PROGRAM

## 2024-12-03 RX ORDER — TIRZEPATIDE 10 MG/.5ML
10 INJECTION, SOLUTION SUBCUTANEOUS WEEKLY
Qty: 2 ML | Refills: 4 | Status: SHIPPED | OUTPATIENT
Start: 2024-12-03

## 2024-12-03 NOTE — TELEPHONE ENCOUNTER
Called and spoke to pt to reschedule 12/20 appt @ 8am w/Alan Rubin due to provider being unavailable as he will be doing hospital rounds in the AM. Pt acknowledged and appt was rescheduled to 12/20 @ 11:30am.

## 2024-12-03 NOTE — Clinical Note
May need another prior auth, use hx of class 3 obesity (BMI >40), hx of sleep apnea, sleeve gastrectomy, GERD as commodities

## 2024-12-13 ENCOUNTER — TELEPHONE (OUTPATIENT)
Age: 59
End: 2024-12-13

## 2024-12-20 ENCOUNTER — OFFICE VISIT (OUTPATIENT)
Age: 59
End: 2024-12-20
Payer: COMMERCIAL

## 2024-12-20 VITALS — BODY MASS INDEX: 36.7 KG/M2 | HEIGHT: 64 IN | WEIGHT: 215 LBS

## 2024-12-20 DIAGNOSIS — M17.11 PRIMARY OSTEOARTHRITIS OF RIGHT KNEE: Primary | ICD-10-CM

## 2024-12-20 PROCEDURE — 99213 OFFICE O/P EST LOW 20 MIN: CPT | Performed by: PHYSICIAN ASSISTANT

## 2024-12-20 PROCEDURE — 20610 DRAIN/INJ JOINT/BURSA W/O US: CPT | Performed by: PHYSICIAN ASSISTANT

## 2024-12-20 RX ORDER — LIDOCAINE HYDROCHLORIDE 10 MG/ML
4 INJECTION, SOLUTION INFILTRATION; PERINEURAL
Status: COMPLETED | OUTPATIENT
Start: 2024-12-20 | End: 2024-12-20

## 2024-12-20 RX ORDER — BETAMETHASONE SODIUM PHOSPHATE AND BETAMETHASONE ACETATE 3; 3 MG/ML; MG/ML
6 INJECTION, SUSPENSION INTRA-ARTICULAR; INTRALESIONAL; INTRAMUSCULAR; SOFT TISSUE
Status: COMPLETED | OUTPATIENT
Start: 2024-12-20 | End: 2024-12-20

## 2024-12-20 RX ADMIN — BETAMETHASONE SODIUM PHOSPHATE AND BETAMETHASONE ACETATE 6 MG: 3; 3 INJECTION, SUSPENSION INTRA-ARTICULAR; INTRALESIONAL; INTRAMUSCULAR; SOFT TISSUE at 12:30

## 2024-12-20 RX ADMIN — LIDOCAINE HYDROCHLORIDE 4 ML: 10 INJECTION, SOLUTION INFILTRATION; PERINEURAL at 12:30

## 2024-12-20 NOTE — PROGRESS NOTES
"Patient Name:  Estefania Avila  MRN:  [id number]    Assessment & Plan     Right Knee DJD  Corticosteroid injection performed today into the right knee.  Tylenol as needed.  Activities as tolerated.  Follow-up in 3 months for repeat evaluation with repeat injection of the right knee at that time as indicated.    Chief Complaint     Right knee pain    History of the Present Illness     Estefania Avila is a 59 y.o. female who returns to the office today for follow-up regarding her right knee.  She was last seen on 9/20/2024.  At that time she received a right knee intra-articular corticosteroid injection.  She noted significant improvement after receiving the injection but notes a slight return of her pain recently over the past few weeks.  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  She denies any recent injury or trauma.  She notes generalized discomfort in the knee rated 5 out of 10 in intensity worse with increased activity and prolonged standing.  She denies any significant swelling or stiffness.  No weakness or instability.  No numbness or tingling.  She currently takes Tylenol.  She is unable to take NSAIDs due to history of gastric bypass.  No fevers or chills.    Physical Exam     Ht 5' 4\" (1.626 m)   Wt 97.5 kg (215 lb)   BMI 36.90 kg/m²     Right knee: No gross deformity.  Skin is intact without erythema ecchymosis or swelling.  No effusion.  No joint line tenderness.  Range of motion is full extension and flexion to 130 degrees with crepitation appreciated.  Stable to varus and valgus rest without pain.  Extensor mechanism is intact.  Sensation is intact distally.    Eyes: Anicteric sclerae.  ENT: Trachea midline.  Lungs: Normal respiratory effort.  CV: Capillary refill is less than 2 seconds.  Skin: Intact without erythema.  Lymph: No palpable lymphadenopathy.  Neuro: Sensation is grossly intact to light touch.  Psych: Mood and affect are " appropriate.      Past Medical History:   Diagnosis Date    Abnormal Pap smear of cervix 05/2016 5/2016 LGSIL, + other HPV; 6/2016 Colpo JAAN 1. 12/2021 pap neg/ HPV neg.    Anemia     DVT (deep venous thrombosis) (HCC)     Factor V Leiden mutation (HCC)     GERD (gastroesophageal reflux disease)     Obesity, Class II, BMI 35-39.9     PONV (postoperative nausea and vomiting)     Pulmonary embolism (HCC)     S/P gastric surgery     gastric sleeve     Sleep apnea     wears c-pap, Last assessed: 3/7/14       Past Surgical History:   Procedure Laterality Date    BARIATRIC SURGERY      CHOLECYSTECTOMY      COLONOSCOPY      complete, resolved: 1996    EGD AND COLONOSCOPY N/A 03/08/2018    Procedure: EGD AND COLONOSCOPY;  Surgeon: Joy Elias MD;  Location: [facility];  Service: Gastroenterology    GALLBLADDER SURGERY      IVC FILTER INSERTION  04/01/2014    Radiologic Supervision: [place] Filter Placement in IVC    MOUTH SURGERY      STOMACH SURGERY  04/01/2014    sleeve, Gastric surgery for Morbid Obesity Laparoscopic Longitudinal Gastrectomy, per allscripts    UPPER GASTROINTESTINAL ENDOSCOPY         Allergies   Allergen Reactions    Pseudoephedrine Tachycardia    Latex Rash       Current Outpatient Medications on File Prior to Visit   Medication Sig Dispense Refill    ferrous sulfate 325 (65 Fe) mg tablet Take 325 mg by mouth daily with breakfast      levonorgestrel (MIRENA) 20 MCG/24HR IUD 1 each by Intrauterine route once      Multiple Vitamin (MULTIVITAMIN) capsule Take 1 capsule by mouth daily      tirzepatide (Zepbound) 10 mg/0.5 mL auto-injector Inject 0.5 mL (10 mg total) under the skin once a week 2 mL 4     No current facility-administered medications on file prior to visit.       Social History     Tobacco Use    Smoking status: Never     Passive exposure: Never    Smokeless tobacco: Never    Tobacco comments:     current non-smoker, per allscripts   Vaping Use    Vaping status: Never Used    Substance Use Topics    Alcohol use: Yes     Alcohol/week: 2.0 standard drinks of alcohol     Types: 2 Standard drinks or equivalent per week     Comment: Socially    Drug use: No       Family History   Problem Relation Age of Onset    Osteoporosis Mother     Diabetes Father     Heart disease Father     Arthritis Father     Heart failure Father     Hypertension Father     Ovarian cancer Maternal Grandmother 82    No Known Problems Maternal Grandfather     No Known Problems Paternal Grandmother     No Known Problems Paternal Grandfather     No Known Problems Paternal Aunt     No Known Problems Paternal Aunt     No Known Problems Paternal Aunt     No Known Problems Paternal Aunt     Colon cancer Neg Hx     Breast cancer Neg Hx     Uterine cancer Neg Hx        Review of Systems     As stated in the HPI. All other systems reviewed and are negative.      Large joint arthrocentesis: R knee  Procedure Details  Location: knee - R knee  Needle size: 22 G  Ultrasound guidance: no  Approach: anterolateral  Medications administered: 4 mL lidocaine 1 %; 6 mg betamethasone acetate-betamethasone sodium phosphate 6 (3-3) mg/mL    Patient tolerance: patient tolerated the procedure well with no immediate complications  Dressing:  Sterile dressing applied

## 2025-01-03 ENCOUNTER — OFFICE VISIT (OUTPATIENT)
Dept: FAMILY MEDICINE CLINIC | Facility: CLINIC | Age: 60
End: 2025-01-03
Payer: COMMERCIAL

## 2025-01-03 VITALS
TEMPERATURE: 98.1 F | OXYGEN SATURATION: 100 % | SYSTOLIC BLOOD PRESSURE: 124 MMHG | WEIGHT: 217 LBS | HEART RATE: 94 BPM | DIASTOLIC BLOOD PRESSURE: 72 MMHG | BODY MASS INDEX: 37.25 KG/M2

## 2025-01-03 DIAGNOSIS — D68.51 FACTOR V LEIDEN (HCC): ICD-10-CM

## 2025-01-03 DIAGNOSIS — J01.00 ACUTE NON-RECURRENT MAXILLARY SINUSITIS: ICD-10-CM

## 2025-01-03 DIAGNOSIS — I27.82 OTHER CHRONIC PULMONARY EMBOLISM WITHOUT ACUTE COR PULMONALE (HCC): Primary | ICD-10-CM

## 2025-01-03 DIAGNOSIS — Z12.31 ENCOUNTER FOR SCREENING MAMMOGRAM FOR MALIGNANT NEOPLASM OF BREAST: ICD-10-CM

## 2025-01-03 PROCEDURE — 99214 OFFICE O/P EST MOD 30 MIN: CPT | Performed by: NURSE PRACTITIONER

## 2025-01-03 NOTE — PROGRESS NOTES
Name: Estefania Avila      : 1965      MRN: [id number]  Encounter Provider: EZEQUIEL Peters  Encounter Date: 1/3/2025   Encounter department: [facility]  :  Assessment & Plan  Other chronic pulmonary embolism without acute cor pulmonale (HCC)  No longer on xarelto   Will resolve this        Factor V Leiden (HCC)  Follows with hematology        Acute non-recurrent maxillary sinusitis  Sick for over 2 weeks  Augmentin prescribed continue otc cough medications and saline   Orders:  •  amoxicillin-clavulanate (AUGMENTIN) 875-125 mg per tablet; Take 1 tablet by mouth every 12 (twelve) hours for 7 days    Encounter for screening mammogram for malignant neoplasm of breast    Orders:  •  Mammo screening bilateral w 3d and cad; Future           History of Present Illness     She started with cold blister and fever. [place] resolved   She has been taking tylenol cold  Her chest feels congested and nasal congestion. Ears are clogged both side   Was nauseated decreased appetite   Has also been taking Nyquil also using saline   Feels worse today       Review of Systems   Constitutional:  Negative for activity change, appetite change and fatigue.   HENT:  Positive for congestion, ear pain, rhinorrhea and sinus pressure.    Respiratory:  Positive for cough.        Objective   /72   Pulse 94   Temp 98.1 °F (36.7 °C)   Wt 98.4 kg (217 lb)   SpO2 100%   BMI 37.25 kg/m²      Physical Exam  Vitals and nursing note reviewed.   Constitutional:       General: She is not in acute distress.     Appearance: Normal appearance. She is obese. She is ill-appearing. She is not diaphoretic.   HENT:      Head: Normocephalic and atraumatic.      Right Ear: External ear normal.      Left Ear: External ear normal.   Eyes:      Extraocular Movements: Extraocular movements intact.      Conjunctiva/sclera: Conjunctivae normal.   Cardiovascular:      Rate and Rhythm: Normal rate and regular rhythm.      Heart  sounds: Normal heart sounds, S1 normal and S2 normal. No murmur heard.  Pulmonary:      Effort: Pulmonary effort is normal.      Breath sounds: Normal breath sounds.   Neurological:      Mental Status: She is alert and oriented to person, place, and time.   Psychiatric:         Mood and Affect: Mood normal.         Behavior: Behavior normal.         Thought Content: Thought content normal.         Judgment: Judgment normal.

## 2025-01-08 ENCOUNTER — APPOINTMENT (OUTPATIENT)
Dept: LAB | Facility: MEDICAL CENTER | Age: 60
End: 2025-01-08
Payer: COMMERCIAL

## 2025-01-08 DIAGNOSIS — K91.2 POSTSURGICAL MALABSORPTION: ICD-10-CM

## 2025-01-08 DIAGNOSIS — D50.8 ACQUIRED IRON DEFICIENCY ANEMIA DUE TO DECREASED ABSORPTION: ICD-10-CM

## 2025-01-08 DIAGNOSIS — R73.9 ELEVATED RANDOM BLOOD GLUCOSE LEVEL: ICD-10-CM

## 2025-01-08 DIAGNOSIS — Z48.815 ENCOUNTER FOR SURGICAL AFTERCARE FOLLOWING SURGERY OF DIGESTIVE SYSTEM: ICD-10-CM

## 2025-01-08 DIAGNOSIS — Z98.84 S/P LAPAROSCOPIC SLEEVE GASTRECTOMY: ICD-10-CM

## 2025-01-08 DIAGNOSIS — G47.30 SLEEP APNEA: ICD-10-CM

## 2025-01-08 DIAGNOSIS — Z01.818 PRE-OPERATIVE CLEARANCE: ICD-10-CM

## 2025-01-08 DIAGNOSIS — E66.813 CLASS 3 OBESITY: ICD-10-CM

## 2025-01-08 DIAGNOSIS — Z98.84 BARIATRIC SURGERY STATUS: ICD-10-CM

## 2025-01-08 DIAGNOSIS — Z86.718 HISTORY OF DVT (DEEP VEIN THROMBOSIS): ICD-10-CM

## 2025-01-08 DIAGNOSIS — E66.812 OBESITY, CLASS II, BMI 35-39.9: ICD-10-CM

## 2025-01-08 DIAGNOSIS — E61.1 IRON DEFICIENCY: ICD-10-CM

## 2025-01-08 DIAGNOSIS — D68.51 FACTOR V LEIDEN (HCC): ICD-10-CM

## 2025-01-08 LAB
25(OH)D3 SERPL-MCNC: 28 NG/ML (ref 30–100)
ALBUMIN SERPL BCG-MCNC: 4.5 G/DL (ref 3.5–5)
ALP SERPL-CCNC: 76 U/L (ref 34–104)
ALT SERPL W P-5'-P-CCNC: 15 U/L (ref 7–52)
ANION GAP SERPL CALCULATED.3IONS-SCNC: 10 MMOL/L (ref 4–13)
AST SERPL W P-5'-P-CCNC: 13 U/L (ref 13–39)
BASOPHILS # BLD AUTO: 0.03 THOUSANDS/ΜL (ref 0–0.1)
BASOPHILS NFR BLD AUTO: 1 % (ref 0–1)
BILIRUB SERPL-MCNC: 0.96 MG/DL (ref 0.2–1)
BUN SERPL-MCNC: 25 MG/DL (ref 5–25)
CALCIUM SERPL-MCNC: 10 MG/DL (ref 8.4–10.2)
CHLORIDE SERPL-SCNC: 101 MMOL/L (ref 96–108)
CO2 SERPL-SCNC: 28 MMOL/L (ref 21–32)
CREAT SERPL-MCNC: 0.59 MG/DL (ref 0.6–1.3)
EOSINOPHIL # BLD AUTO: 0.1 THOUSAND/ΜL (ref 0–0.61)
EOSINOPHIL NFR BLD AUTO: 2 % (ref 0–6)
ERYTHROCYTE [DISTWIDTH] IN BLOOD BY AUTOMATED COUNT: 12.5 % (ref 11.6–15.1)
FERRITIN SERPL-MCNC: 170 NG/ML (ref 11–307)
FOLATE SERPL-MCNC: 15.7 NG/ML
GFR SERPL CREATININE-BSD FRML MDRD: 102 ML/MIN/1.73SQ M
GLUCOSE SERPL-MCNC: 86 MG/DL (ref 65–140)
HCT VFR BLD AUTO: 45.5 % (ref 34.8–46.1)
HGB BLD-MCNC: 14.7 G/DL (ref 11.5–15.4)
IMM GRANULOCYTES # BLD AUTO: 0.02 THOUSAND/UL (ref 0–0.2)
IMM GRANULOCYTES NFR BLD AUTO: 0 % (ref 0–2)
IRON SATN MFR SERPL: 21 % (ref 15–50)
IRON SERPL-MCNC: 71 UG/DL (ref 50–212)
LYMPHOCYTES # BLD AUTO: 2.13 THOUSANDS/ΜL (ref 0.6–4.47)
LYMPHOCYTES NFR BLD AUTO: 37 % (ref 14–44)
MCH RBC QN AUTO: 29.3 PG (ref 26.8–34.3)
MCHC RBC AUTO-ENTMCNC: 32.3 G/DL (ref 31.4–37.4)
MCV RBC AUTO: 91 FL (ref 82–98)
MONOCYTES # BLD AUTO: 0.36 THOUSAND/ΜL (ref 0.17–1.22)
MONOCYTES NFR BLD AUTO: 6 % (ref 4–12)
NEUTROPHILS # BLD AUTO: 3.2 THOUSANDS/ΜL (ref 1.85–7.62)
NEUTS SEG NFR BLD AUTO: 54 % (ref 43–75)
NRBC BLD AUTO-RTO: 0 /100 WBCS
PLATELET # BLD AUTO: 240 THOUSANDS/UL (ref 149–390)
PMV BLD AUTO: 11.1 FL (ref 8.9–12.7)
POTASSIUM SERPL-SCNC: 4 MMOL/L (ref 3.5–5.3)
PROT SERPL-MCNC: 7.5 G/DL (ref 6.4–8.4)
RBC # BLD AUTO: 5.01 MILLION/UL (ref 3.81–5.12)
SODIUM SERPL-SCNC: 139 MMOL/L (ref 135–147)
TIBC SERPL-MCNC: 338.8 UG/DL (ref 250–450)
TRANSFERRIN SERPL-MCNC: 242 MG/DL (ref 203–362)
TSH SERPL DL<=0.05 MIU/L-ACNC: 0.97 UIU/ML (ref 0.45–4.5)
UIBC SERPL-MCNC: 268 UG/DL (ref 155–355)
VIT B12 SERPL-MCNC: 270 PG/ML (ref 180–914)
WBC # BLD AUTO: 5.84 THOUSAND/UL (ref 4.31–10.16)

## 2025-01-08 PROCEDURE — 84425 ASSAY OF VITAMIN B-1: CPT

## 2025-01-08 PROCEDURE — 82306 VITAMIN D 25 HYDROXY: CPT

## 2025-01-08 PROCEDURE — 83540 ASSAY OF IRON: CPT

## 2025-01-08 PROCEDURE — 82607 VITAMIN B-12: CPT

## 2025-01-08 PROCEDURE — 84443 ASSAY THYROID STIM HORMONE: CPT

## 2025-01-08 PROCEDURE — 80053 COMPREHEN METABOLIC PANEL: CPT

## 2025-01-08 PROCEDURE — 84590 ASSAY OF VITAMIN A: CPT

## 2025-01-08 PROCEDURE — 83550 IRON BINDING TEST: CPT

## 2025-01-08 PROCEDURE — 36415 COLL VENOUS BLD VENIPUNCTURE: CPT

## 2025-01-08 PROCEDURE — 83970 ASSAY OF PARATHORMONE: CPT

## 2025-01-08 PROCEDURE — 82746 ASSAY OF FOLIC ACID SERUM: CPT

## 2025-01-08 PROCEDURE — 83036 HEMOGLOBIN GLYCOSYLATED A1C: CPT

## 2025-01-08 PROCEDURE — 84630 ASSAY OF ZINC: CPT

## 2025-01-09 ENCOUNTER — RESULTS FOLLOW-UP (OUTPATIENT)
Dept: BARIATRICS | Facility: CLINIC | Age: 60
End: 2025-01-09

## 2025-01-09 DIAGNOSIS — Z90.3 POSTGASTRECTOMY MALABSORPTION: Primary | ICD-10-CM

## 2025-01-09 DIAGNOSIS — E66.812 OBESITY, CLASS II, BMI 35-39.9: ICD-10-CM

## 2025-01-09 DIAGNOSIS — E55.9 VITAMIN D INSUFFICIENCY: ICD-10-CM

## 2025-01-09 DIAGNOSIS — K91.2 POSTSURGICAL MALABSORPTION: ICD-10-CM

## 2025-01-09 DIAGNOSIS — Z98.84 S/P LAPAROSCOPIC SLEEVE GASTRECTOMY: ICD-10-CM

## 2025-01-09 DIAGNOSIS — E53.8 LOW SERUM VITAMIN B12: ICD-10-CM

## 2025-01-09 DIAGNOSIS — K91.2 POSTGASTRECTOMY MALABSORPTION: Primary | ICD-10-CM

## 2025-01-09 LAB
EST. AVERAGE GLUCOSE BLD GHB EST-MCNC: 111 MG/DL
HBA1C MFR BLD: 5.5 %
PTH-INTACT SERPL-MCNC: 41.6 PG/ML (ref 12–88)

## 2025-01-09 RX ORDER — TIRZEPATIDE 10 MG/.5ML
10 INJECTION, SOLUTION SUBCUTANEOUS WEEKLY
Qty: 2 ML | Refills: 0 | Status: SHIPPED | OUTPATIENT
Start: 2025-01-09

## 2025-01-13 LAB — ZINC SERPL-MCNC: 73 UG/DL (ref 44–115)

## 2025-01-14 ENCOUNTER — OFFICE VISIT (OUTPATIENT)
Dept: HEMATOLOGY ONCOLOGY | Facility: CLINIC | Age: 60
End: 2025-01-14
Payer: COMMERCIAL

## 2025-01-14 VITALS
DIASTOLIC BLOOD PRESSURE: 72 MMHG | OXYGEN SATURATION: 96 % | HEIGHT: 64 IN | HEART RATE: 110 BPM | WEIGHT: 213 LBS | SYSTOLIC BLOOD PRESSURE: 122 MMHG | TEMPERATURE: 97.5 F | RESPIRATION RATE: 16 BRPM | BODY MASS INDEX: 36.37 KG/M2

## 2025-01-14 DIAGNOSIS — Z90.3 POSTGASTRECTOMY MALABSORPTION: Primary | ICD-10-CM

## 2025-01-14 DIAGNOSIS — E61.1 IRON DEFICIENCY: ICD-10-CM

## 2025-01-14 DIAGNOSIS — K91.2 POSTGASTRECTOMY MALABSORPTION: Primary | ICD-10-CM

## 2025-01-14 DIAGNOSIS — E53.8 B12 DEFICIENCY: ICD-10-CM

## 2025-01-14 DIAGNOSIS — D68.51 FACTOR V LEIDEN (HCC): ICD-10-CM

## 2025-01-14 LAB
VIT A SERPL-MCNC: 48 UG/DL (ref 20.1–62)
VIT B1 BLD-SCNC: 129.9 NMOL/L (ref 66.5–200)

## 2025-01-14 PROCEDURE — 99214 OFFICE O/P EST MOD 30 MIN: CPT | Performed by: PHYSICIAN ASSISTANT

## 2025-01-14 NOTE — PROGRESS NOTES
Name: Estefania Avila      : 1965      MRN: [id number]  Encounter Provider: Edna Ny PA-C  Encounter Date: 2025   Encounter department: [facility] [place]  :  Assessment & Plan  Postgastrectomy malabsorption  Leading to iron def and B12 def   Continue to follow up with bariatrics.   They are able to place orders for B12 injections and iron infusions if needed.   Follow up heme PRN       Iron deficiency  Labs are stable at this time   No intervention needed        B12 deficiency  B12 low 270   Advised 1000 mcg sublingual daily          Factor V Leiden (HCC)  Continue to make all health care providers aware in setting of procedure especially            History of Present Illness   Chief Complaint   Patient presents with    Follow-up   Follow up for iron def   Pertinent Medical History   25:    follow-up regarding history of thrombosis     12 years ago she was placed on OCP.  She within 2 weeks had a PE and DVT.  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  She states her mother is also heterozygous.  Her father does not carry it.  Her son has been tested and he is not a carrier.     Due to this she was maintained on Coumadin for all of this time.  She presently has a copper IUD which she states is getting removed next week due to the time of placement she is due for change of IUD.     8 years ago she had gastric sleeve bariatric surgery. No clots after surgery.      She had no clots during pregnancy.  She did have multiple miscarriages prior to her term pregnancy however.       Review of Systems   Constitutional:  Negative for appetite change, chills, fatigue, fever and unexpected weight change.   HENT:  Negative for mouth sores and nosebleeds.    Respiratory:  Negative for cough and shortness of breath.    Cardiovascular:  Negative for chest pain, palpitations and leg swelling.   Gastrointestinal:  Negative for abdominal pain, blood in  "stool, constipation, diarrhea, nausea and vomiting.   Genitourinary:  Negative for difficulty urinating, dysuria and hematuria.   Musculoskeletal:  Negative for arthralgias.   Skin: Negative.    Neurological:  Negative for dizziness, weakness, light-headedness, numbness and headaches.   Hematological: Negative.    Psychiatric/Behavioral: Negative.             Objective   /72 (BP Location: Left arm, Patient Position: Sitting, Cuff Size: Large)   Pulse (!) 110   Temp 97.5 °F (36.4 °C) (Temporal)   Resp 16   Ht 5' 4\" (1.626 m)   Wt 96.6 kg (213 lb)   SpO2 96%   BMI 36.56 kg/m²     Physical Exam  Vitals reviewed.   Constitutional:       General: She is not in acute distress.     Appearance: She is well-developed. She is not ill-appearing.   HENT:      Head: Normocephalic and atraumatic.   Eyes:      General: No scleral icterus.     Conjunctiva/sclera: Conjunctivae normal.   Cardiovascular:      Rate and Rhythm: Normal rate and regular rhythm.      Heart sounds: Normal heart sounds. No murmur heard.  Pulmonary:      Effort: Pulmonary effort is normal. No respiratory distress.      Breath sounds: Normal breath sounds.   Abdominal:      Palpations: Abdomen is soft.      Tenderness: There is no abdominal tenderness.   Musculoskeletal:         General: No tenderness. Normal range of motion.      Cervical back: Normal range of motion and neck supple.      Right lower leg: No edema.      Left lower leg: No edema.   Lymphadenopathy:      Cervical: No cervical adenopathy.   Skin:     General: Skin is warm and dry.   Neurological:      Mental Status: She is alert and oriented to person, place, and time.      Cranial Nerves: No cranial nerve deficit.   Psychiatric:         Mood and Affect: Mood normal.         Behavior: Behavior normal.         Labs: I have reviewed the following labs:  Results for orders placed or performed in visit on 01/08/25   Hemoglobin A1C   Result Value Ref Range    Hemoglobin A1C 5.5 Normal " 4.0-5.6%; PreDiabetic 5.7-6.4%; Diabetic >=6.5%; Glycemic control for adults with diabetes <7.0% %     mg/dl   TSH, 3rd generation   Result Value Ref Range    TSH 3RD GENERATON 0.967 0.450 - 4.500 uIU/mL   Comprehensive metabolic panel   Result Value Ref Range    Sodium 139 135 - 147 mmol/L    Potassium 4.0 3.5 - 5.3 mmol/L    Chloride 101 96 - 108 mmol/L    CO2 28 21 - 32 mmol/L    ANION GAP 10 4 - 13 mmol/L    BUN 25 5 - 25 mg/dL    Creatinine 0.59 (L) 0.60 - 1.30 mg/dL    Glucose 86 65 - 140 mg/dL    Calcium 10.0 8.4 - 10.2 mg/dL    AST 13 13 - 39 U/L    ALT 15 7 - 52 U/L    Alkaline Phosphatase 76 34 - 104 U/L    Total Protein 7.5 6.4 - 8.4 g/dL    Albumin 4.5 3.5 - 5.0 g/dL    Total Bilirubin 0.96 0.20 - 1.00 mg/dL    eGFR 102 ml/min/1.73sq m   Result Value Ref Range    Zinc 73 44 - 115 ug/dL   Vitamin D 25 hydroxy   Result Value Ref Range    Vit D, 25-Hydroxy 28.0 (L) 30.0 - 100.0 ng/mL   Result Value Ref Range    Vitamin B1, Whole Blood 129.9 66.5 - 200.0 nmol/L   PTH, intact   Result Value Ref Range    PTH 41.6 12.0 - 88.0 pg/mL   Vitamin B12   Result Value Ref Range    Vitamin B-12 270 180 - 914 pg/mL   Result Value Ref Range    Folate 15.7 >5.9 ng/mL   TIBC Panel (incl. Iron, TIBC, % Iron Saturation)   Result Value Ref Range    Iron Saturation 21 15 - 50 %    TIBC 338.8 250 - 450 ug/dL    Iron 71 50 - 212 ug/dL    Transferrin 242 203 - 362 mg/dL    UIBC 268 155 - 355 ug/dL

## 2025-01-14 NOTE — ASSESSMENT & PLAN NOTE
Leading to iron def and B12 def   Continue to follow up with bariatrics.   They are able to place orders for B12 injections and iron infusions if needed.   Follow up heme PRN

## 2025-01-15 ENCOUNTER — RESULTS FOLLOW-UP (OUTPATIENT)
Dept: BARIATRICS | Facility: CLINIC | Age: 60
End: 2025-01-15

## 2025-02-14 DIAGNOSIS — E66.812 OBESITY, CLASS II, BMI 35-39.9: ICD-10-CM

## 2025-02-14 DIAGNOSIS — G47.30 SLEEP APNEA: ICD-10-CM

## 2025-02-14 DIAGNOSIS — E61.1 IRON DEFICIENCY: ICD-10-CM

## 2025-02-14 DIAGNOSIS — E66.813 CLASS 3 OBESITY: ICD-10-CM

## 2025-02-14 RX ORDER — TIRZEPATIDE 10 MG/.5ML
10 INJECTION, SOLUTION SUBCUTANEOUS WEEKLY
Qty: 2 ML | Refills: 0 | Status: SHIPPED | OUTPATIENT
Start: 2025-02-14

## 2025-02-17 ENCOUNTER — TELEPHONE (OUTPATIENT)
Dept: BARIATRICS | Facility: CLINIC | Age: 60
End: 2025-02-17

## 2025-02-17 DIAGNOSIS — E66.812 OBESITY, CLASS II, BMI 35-39.9: ICD-10-CM

## 2025-02-17 DIAGNOSIS — G47.30 SLEEP APNEA: ICD-10-CM

## 2025-02-17 DIAGNOSIS — E66.813 CLASS 3 OBESITY: ICD-10-CM

## 2025-02-17 DIAGNOSIS — E61.1 IRON DEFICIENCY: ICD-10-CM

## 2025-02-17 NOTE — TELEPHONE ENCOUNTER
Reason for call:   [] Refill   [x] Prior Auth  [] Other:     Office:   [] PCP/Provider -   [x] Specialty/Provider - [facility]  Authorized By: Rad Ocampo MD      Medication:  tirzepatide (Zepbound) 10 mg/0.5 mL auto-injector    Dose/Frequency: Inject 0.5 mL (10 mg total) under the skin once a week    Quantity: 2 mL    Pharmacy: [facility]/pharmacy #[id number]  [place] - [address] [place] [address]     Does the patient have enough for 3 days?   [] Yes   [x] No - Send as HP to POD

## 2025-02-17 NOTE — TELEPHONE ENCOUNTER
PA for zepbound 10mg SUBMITTED to [facility]     via    []CMM-KEY: [id number]  []Surescripts-Case ID #   []Availity-Auth ID # NDC #   []Faxed to plan   []Other website   []Phone call Case ID #     []PA sent as URGENT    All office notes, labs and other pertaining documents and studies sent. Clinical questions answered. Awaiting determination from insurance company.     Turnaround time for your insurance to make a decision on your Prior Authorization can take 7-21 business days.

## 2025-02-18 ENCOUNTER — PATIENT MESSAGE (OUTPATIENT)
Dept: BARIATRICS | Facility: CLINIC | Age: 60
End: 2025-02-18

## 2025-02-18 RX ORDER — TIRZEPATIDE 10 MG/.5ML
10 INJECTION, SOLUTION SUBCUTANEOUS WEEKLY
Qty: 2 ML | Refills: 0 | OUTPATIENT
Start: 2025-02-18

## 2025-02-18 NOTE — PATIENT COMMUNICATION
Patient calling about a denial she received in [facility] for Zepbound 10mg. I believe it was a duplicate refill request denial, NOT a PA denial.    PA had previously  and new PA was submitted to [employer] yesterday. Still waiting on determination.  Explained this to patient. Once we receive a determination we will notify the patient also.    Patient has one injection remaining, due . States she feels great and is hopeful it does not get denied.    Patient next visit with Clemencia on 3/3.    #[phone]  
Walk in

## 2025-02-19 ENCOUNTER — TELEPHONE (OUTPATIENT)
Dept: BARIATRICS | Facility: CLINIC | Age: 60
End: 2025-02-19

## 2025-02-19 NOTE — TELEPHONE ENCOUNTER
PA for zepbound 10mgSUBMITTED to [employer]    via    []CMM-KEY: [id number]  []Surescripts-Case ID #   []Availity-Auth ID # NDC #   []Faxed to plan   []Other website   []Phone call Case ID #     []PA sent as URGENT    All office notes, labs and other pertaining documents and studies sent. Clinical questions answered. Awaiting determination from insurance company.     Turnaround time for your insurance to make a decision on your Prior Authorization can take 7-21 business days.

## 2025-02-20 NOTE — TELEPHONE ENCOUNTER
PA for zepbound 10mg  DENIED    Reason:(Screenshot if applicable)        Message sent to office clinical pool Yes    Denial letter scanned into Media Yes    Appeal started No (Provider will need to decide if appeal is warranted and send clinical documentation to Prior Authorization Team for initiation.)    **Please follow up with your patient regarding denial and next steps**    No

## 2025-02-21 NOTE — TELEPHONE ENCOUNTER
Patient following up on prior authorization. She would like for appeal to be processed. Please follow up with her once this has been taken care of.

## 2025-02-28 ENCOUNTER — TELEPHONE (OUTPATIENT)
Dept: BARIATRICS | Facility: CLINIC | Age: 60
End: 2025-02-28

## 2025-02-28 NOTE — TELEPHONE ENCOUNTER
Patient calling in and asking to speak with Marquez     Tried to contact Marquez she was busy at moment     Please call patient back and discuss appeal needs

## 2025-02-28 NOTE — TELEPHONE ENCOUNTER
Sent patient all the information we would need to be able to appeal her denial of zepbound with her insurance

## 2025-03-03 ENCOUNTER — PATIENT MESSAGE (OUTPATIENT)
Dept: BARIATRICS | Facility: CLINIC | Age: 60
End: 2025-03-03

## 2025-03-03 ENCOUNTER — OFFICE VISIT (OUTPATIENT)
Dept: BARIATRICS | Facility: CLINIC | Age: 60
End: 2025-03-03
Payer: COMMERCIAL

## 2025-03-03 VITALS
DIASTOLIC BLOOD PRESSURE: 80 MMHG | WEIGHT: 212.5 LBS | HEIGHT: 64 IN | HEART RATE: 72 BPM | SYSTOLIC BLOOD PRESSURE: 126 MMHG | BODY MASS INDEX: 36.28 KG/M2 | TEMPERATURE: 97.9 F

## 2025-03-03 DIAGNOSIS — K91.2 POSTSURGICAL MALABSORPTION: ICD-10-CM

## 2025-03-03 DIAGNOSIS — Z48.815 ENCOUNTER FOR SURGICAL AFTERCARE FOLLOWING SURGERY OF DIGESTIVE SYSTEM: Primary | ICD-10-CM

## 2025-03-03 DIAGNOSIS — Z98.84 BARIATRIC SURGERY STATUS: ICD-10-CM

## 2025-03-03 DIAGNOSIS — E66.812 OBESITY, CLASS II, BMI 35-39.9: ICD-10-CM

## 2025-03-03 PROCEDURE — 99214 OFFICE O/P EST MOD 30 MIN: CPT | Performed by: PHYSICIAN ASSISTANT

## 2025-03-03 RX ORDER — CHOLECALCIFEROL (VITAMIN D3) 50 MCG
CAPSULE ORAL
COMMUNITY

## 2025-03-03 NOTE — PROGRESS NOTES
Date of surgery:4/1/2014   Procedure: Sleeve  Performing surgeon: Dr. Ruiz    Initial Weight - 278 lb  Current Weight -212.5 lb  Pancho Weight - 168 lb  Total Body Weight Loss (EWL)- 65.5%  EWL% - 52%  TWB % -24%

## 2025-03-03 NOTE — PROGRESS NOTES
Assessment/Plan:    - s/p Vertical Sleeve Gastrectomy with Dr. Ruiz on 4/1/2014. Here for weight gain follow up   - patient has been on zepbound per MW for several months and doing very well, however just this past month her coverage stopped. Her last injection was exactly 1 week ago. Our team is now working on the appeal for her.   - patient has seen our dietician several times and eats fairly heathy with mainly protein. She has been exercising by doing consistent yoga. Zepbound has also improved her bowel habits and joint pains and general anxiety. She does require a knee replacement and needs to continue losing weight prior to this,   - Appetite overall controlled on zepbound however she is feeling more hungry lately and having more food noise,  and would be open to increasing dsoe to 12 mg if appropriate.   - will await her appeal notice. If approved will continue to increase dose If she continues to be off the zepbound longer than the next 3 days then will have to reduce to previous dosage     Initial weight: 233 lbs  Last visit: 215 lbs    Current weight: 212 lbs     Patient denies personal and family history of MEN2 tumors and medullary thyroid/thyroid carcinoma. Patient denies personal history of pancreatitis.       Follow up in approximately 3 months     Goals:  Food log (ie.) [email or web address]. baritastic  No sugary beverages. At least 64oz of water daily.  Increase physical activity by 10 minutes daily. Gradually increase physical activity to a goal of 5 days per week for 30 minutes of MODERATE intensity PLUS 2 days per week of FULL BODY resistance training  5-10 servings of fruits and vegetables per day and 25-35 grams of dietary fiber per day, gradually increasing       Diagnoses and all orders for this visit:    Encounter for surgical aftercare following surgery of digestive system    Bariatric surgery status    Postsurgical malabsorption    Obesity,  "Class II, BMI 35-39.9    Other orders  -     cyanocobalamin (VITAMIN B-12) 2000 MCG tablet; Take 2,000 mcg by mouth daily  -     Cholecalciferol (CVS D3) 50 MCG (2000 UT) CAPS; Take by mouth        Subjective:   Chief Complaint   Patient presents with    Follow-up      3 month Post-op Wt / Gain  F/U .     Patient ID: Estefania Avila  is a 59 y.o. female with excess weight/obesity here to pursue weight management.     B: protein shake   S: cottage cheese   L: eggs bites and cucumbers   S: occasional yogurt   D: veggies with protein      Colonoscopy-Completed    The following portions of the patient's history were reviewed and updated as appropriate: allergies, current medications, past family history, past medical history, past social history, past surgical history, and problem list.    Review of Systems   Constitutional: Negative.    Respiratory: Negative.     Cardiovascular: Negative.    Gastrointestinal: Negative.    Neurological: Negative.    Psychiatric/Behavioral: Negative.         Objective:    /80 (Patient Position: Sitting, Cuff Size: Large)   Pulse 72   Temp 97.9 °F (36.6 °C) (Tympanic)   Ht 5' 4\" (1.626 m)   Wt 96.4 kg (212 lb 8 oz)   BMI 36.48 kg/m²      Physical Exam  Vitals and nursing note reviewed.   Constitutional:       Appearance: Normal appearance. She is obese.   HENT:      Head: Normocephalic and atraumatic.   Eyes:      Extraocular Movements: Extraocular movements intact.   Cardiovascular:      Rate and Rhythm: Normal rate.   Pulmonary:      Effort: Pulmonary effort is normal.   Musculoskeletal:         General: Normal range of motion.      Cervical back: Normal range of motion.   Skin:     General: Skin is warm and dry.   Neurological:      General: No focal deficit present.      Mental Status: She is alert and oriented to person, place, and time.   Psychiatric:         Mood and Affect: Mood normal.         "

## 2025-03-05 ENCOUNTER — TELEPHONE (OUTPATIENT)
Dept: BARIATRICS | Facility: CLINIC | Age: 60
End: 2025-03-05

## 2025-03-05 RX ORDER — TIRZEPATIDE 12.5 MG/.5ML
12.5 INJECTION, SOLUTION SUBCUTANEOUS WEEKLY
Qty: 2 ML | Refills: 2 | Status: SHIPPED | OUTPATIENT
Start: 2025-03-05 | End: 2025-04-02

## 2025-03-05 NOTE — TELEPHONE ENCOUNTER
Spoke with Pt re: Zepbound dosage. Pt was agreeable to increasing her Zepbound. Pt has 3 remaining pens of 10 mg dose.

## 2025-03-17 DIAGNOSIS — E66.812 OBESITY, CLASS II, BMI 35-39.9: ICD-10-CM

## 2025-03-17 RX ORDER — TIRZEPATIDE 12.5 MG/.5ML
12.5 INJECTION, SOLUTION SUBCUTANEOUS WEEKLY
Qty: 2 ML | Refills: 0 | OUTPATIENT
Start: 2025-03-17 | End: 2025-04-14

## 2025-03-17 NOTE — TELEPHONE ENCOUNTER
PT called in following up on status of refill request for Zepbound 12.5mg. Updated PT that script was sent to preferred pharmacy with x2 refills. PT verbalized understanding and agreeable to plan.

## 2025-03-21 ENCOUNTER — OFFICE VISIT (OUTPATIENT)
Age: 60
End: 2025-03-21
Payer: COMMERCIAL

## 2025-03-21 DIAGNOSIS — M17.11 PRIMARY OSTEOARTHRITIS OF RIGHT KNEE: Primary | ICD-10-CM

## 2025-03-21 PROCEDURE — 20610 DRAIN/INJ JOINT/BURSA W/O US: CPT | Performed by: PHYSICIAN ASSISTANT

## 2025-03-21 PROCEDURE — 99213 OFFICE O/P EST LOW 20 MIN: CPT | Performed by: PHYSICIAN ASSISTANT

## 2025-03-21 RX ORDER — LIDOCAINE HYDROCHLORIDE 10 MG/ML
4 INJECTION, SOLUTION INFILTRATION; PERINEURAL
Status: COMPLETED | OUTPATIENT
Start: 2025-03-21 | End: 2025-03-21

## 2025-03-21 RX ORDER — TRIAMCINOLONE ACETONIDE 40 MG/ML
40 INJECTION, SUSPENSION INTRA-ARTICULAR; INTRAMUSCULAR
Status: COMPLETED | OUTPATIENT
Start: 2025-03-21 | End: 2025-03-21

## 2025-03-21 RX ADMIN — TRIAMCINOLONE ACETONIDE 40 MG: 40 INJECTION, SUSPENSION INTRA-ARTICULAR; INTRAMUSCULAR at 13:00

## 2025-03-21 RX ADMIN — LIDOCAINE HYDROCHLORIDE 4 ML: 10 INJECTION, SOLUTION INFILTRATION; PERINEURAL at 13:00

## 2025-03-21 NOTE — PROGRESS NOTES
Patient Name:  Estefania Avila  MRN:  [id number]    Assessment & Plan     1. Primary osteoarthritis of right knee      Patient was offered and accepted a right knee intra-articular corticosteroid injection today.  Continue Tylenol as needed.  Activities as tolerated with modification avoid pain.  Follow-up in 3 months for repeat evaluation with repeat intra-articular corticosteroid injection injection at that time as indicated.  Consider authorization process for Durolane injection at that time as well.    Chief Complaint     Follow-up right knee DJD.    History of the Present Illness     Estefania Avila is a 59 y.o. female who returns to the office today for follow-up regarding her right knee DJD.  She was last seen on 12/20/2024.  At that time she received a right knee intra-articular corticosteroid injection.  She noted significant treatment after receiving the injection and notes a gradual return of her pain recently.  She notes generalized discomfort in the knee with stiffness.  She denies any significant swelling.  No weakness or instability.  Pain is worse after being inactive for long periods of time.  She currently takes Tylenol with improvement.  She is unable to take NSAIDs due to history of gastric bypass.  Patient also reports a recent 35 pound weight loss after initiating Zepbound and does note improvement in her knee joints since her weight loss.  No numbness or tingling.  No fevers or chills.  She is interested in a repeat injection today.    Physical Exam     There were no vitals taken for this visit.    Right knee: No gross deformity. Skin is intact without erythema ecchymosis or swelling. No effusion. No joint line tenderness. Range of motion is full extension and flexion to 130 degrees with crepitation appreciated. Stable to varus and valgus rest without pain. Extensor mechanism is intact. Sensation is intact distally.     Eyes: Anicteric sclerae.  ENT: Trachea midline.  Lungs: Normal respiratory  effort.  CV: Capillary refill is less than 2 seconds.  Skin: Intact without erythema.  Lymph: No palpable lymphadenopathy.  Neuro: Sensation is grossly intact to light touch.  Psych: Mood and affect are appropriate.      Past Medical History:   Diagnosis Date    Abnormal Pap smear of cervix 05/2016 5/2016 LGSIL, + other HPV; 6/2016 Colpo JANA 1. 12/2021 pap neg/ HPV neg.    Anemia     DVT (deep venous thrombosis) (HCC)     Factor V Leiden mutation (HCC)     GERD (gastroesophageal reflux disease)     Obesity, Class II, BMI 35-39.9     PONV (postoperative nausea and vomiting)     Pulmonary embolism (HCC)     S/P gastric surgery     gastric sleeve     Sleep apnea     wears c-pap, Last assessed: 3/7/14       Past Surgical History:   Procedure Laterality Date    BARIATRIC SURGERY      CHOLECYSTECTOMY      COLONOSCOPY      complete, resolved: 1996    EGD AND COLONOSCOPY N/A 03/08/2018    Procedure: EGD AND COLONOSCOPY;  Surgeon: Joy Elias MD;  Location: [facility];  Service: Gastroenterology    GALLBLADDER SURGERY      IVC FILTER INSERTION  04/01/2014    Radiologic Supervision: Fannin Filter Placement in IVC    MOUTH SURGERY      STOMACH SURGERY  04/01/2014    sleeve, Gastric surgery for Morbid Obesity Laparoscopic Longitudinal Gastrectomy, per allscripts    UPPER GASTROINTESTINAL ENDOSCOPY         Allergies   Allergen Reactions    Pseudoephedrine Tachycardia    Latex Rash       Current Outpatient Medications on File Prior to Visit   Medication Sig Dispense Refill    Cholecalciferol (CVS D3) 50 MCG (2000 UT) CAPS Take by mouth      cyanocobalamin (VITAMIN B-12) 2000 MCG tablet Take 2,000 mcg by mouth daily      ferrous sulfate 325 (65 Fe) mg tablet Take 325 mg by mouth daily with breakfast      levonorgestrel (MIRENA) 20 MCG/24HR IUD 1 each by Intrauterine route once      Multiple Vitamin (MULTIVITAMIN) capsule Take 1 capsule by mouth daily      tirzepatide (Zepbound) 12.5 mg/0.5 mL auto-injector Inject 0.5  mL (12.5 mg total) under the skin once a week for 28 days 2 mL 2     No current facility-administered medications on file prior to visit.       Social History     Tobacco Use    Smoking status: Never     Passive exposure: Never    Smokeless tobacco: Never    Tobacco comments:     current non-smoker, per allscripts   Vaping Use    Vaping status: Never Used   Substance Use Topics    Alcohol use: Yes     Alcohol/week: 2.0 standard drinks of alcohol     Types: 2 Standard drinks or equivalent per week     Comment: Socially    Drug use: No       Family History   Problem Relation Age of Onset    Osteoporosis Mother     Diabetes Father     Heart disease Father     Arthritis Father     Heart failure Father     Hypertension Father     Ovarian cancer Maternal Grandmother 82    No Known Problems Maternal Grandfather     No Known Problems Paternal Grandmother     No Known Problems Paternal Grandfather     No Known Problems Paternal Aunt     No Known Problems Paternal Aunt     No Known Problems Paternal Aunt     No Known Problems Paternal Aunt     Colon cancer Neg Hx     Breast cancer Neg Hx     Uterine cancer Neg Hx        Review of Systems     As stated in the HPI. All other systems reviewed and are negative.      Large joint arthrocentesis: R knee  Procedure Details  Location: knee - R knee  Needle size: 22 G  Ultrasound guidance: no  Approach: anterolateral  Medications administered: 4 mL lidocaine 1 %; 40 mg triamcinolone acetonide 40 mg/mL    Patient tolerance: patient tolerated the procedure well with no immediate complications  Dressing:  Sterile dressing applied

## 2025-03-27 ENCOUNTER — OFFICE VISIT (OUTPATIENT)
Dept: FAMILY MEDICINE CLINIC | Facility: CLINIC | Age: 60
End: 2025-03-27
Payer: COMMERCIAL

## 2025-03-27 VITALS
BODY MASS INDEX: 35.34 KG/M2 | TEMPERATURE: 96 F | DIASTOLIC BLOOD PRESSURE: 60 MMHG | WEIGHT: 207 LBS | HEIGHT: 64 IN | SYSTOLIC BLOOD PRESSURE: 122 MMHG | OXYGEN SATURATION: 98 % | HEART RATE: 93 BPM | RESPIRATION RATE: 18 BRPM

## 2025-03-27 DIAGNOSIS — J01.00 ACUTE NON-RECURRENT MAXILLARY SINUSITIS: Primary | ICD-10-CM

## 2025-03-27 PROCEDURE — 99213 OFFICE O/P EST LOW 20 MIN: CPT | Performed by: NURSE PRACTITIONER

## 2025-03-27 RX ORDER — AZITHROMYCIN 250 MG/1
TABLET, FILM COATED ORAL
Qty: 6 TABLET | Refills: 0 | Status: SHIPPED | OUTPATIENT
Start: 2025-03-27 | End: 2025-04-01

## 2025-03-27 NOTE — PROGRESS NOTES
"Name: Estefania Avila      : 1965      MRN: [id number]  Encounter Provider: EZEQUIEL Peters  Encounter Date: 3/27/2025   Encounter department: [facility]  :  Assessment & Plan  Acute non-recurrent maxillary sinusitis  Start zpack   May continue benadryl as needed   Orders:  •  azithromycin (Zithromax) 250 mg tablet; Take 2 tablets (500 mg total) by mouth daily for 1 day, THEN 1 tablet (250 mg total) daily for 4 days.          Depression Screening and Follow-up Plan: Patient was screened for depression during today's encounter. They screened negative with a PHQ-9 score of 0.        History of Present Illness   Symptoms started as a cold last Tuesday   Sinus symptoms have progressed then and mucous is thickened   No fever  She has been taking benadryl and tylenol with minimal relief         Review of Systems   Constitutional:  Negative for fatigue.   HENT:  Positive for congestion, ear pain, sinus pressure, sinus pain and sore throat.    Respiratory:  Positive for cough (slightly).    Gastrointestinal:  Negative for abdominal pain, diarrhea, nausea and vomiting.   Allergic/Immunologic: Positive for environmental allergies.   Neurological:  Negative for dizziness, light-headedness and headaches.       Objective   /60 (BP Location: Left arm, Patient Position: Sitting, Cuff Size: Adult)   Pulse 93   Temp (!) 96 °F (35.6 °C) (Tympanic)   Resp 18   Ht 5' 4\" (1.626 m)   Wt 93.9 kg (207 lb)   SpO2 98%   BMI 35.53 kg/m²      Physical Exam  Vitals and nursing note reviewed.   Constitutional:       General: She is not in acute distress.     Appearance: Normal appearance. She is obese. She is ill-appearing. She is not diaphoretic.   HENT:      Head: Normocephalic and atraumatic.      Right Ear: External ear normal. A middle ear effusion is present.      Left Ear: External ear normal. A middle ear effusion is present.      Nose: Rhinorrhea present. Rhinorrhea is purulent.      " Mouth/Throat:      Lips: Pink.      Mouth: Mucous membranes are moist.      Pharynx: Oropharynx is clear. No postnasal drip.   Eyes:      Conjunctiva/sclera: Conjunctivae normal.   Cardiovascular:      Rate and Rhythm: Normal rate and regular rhythm.      Heart sounds: Normal heart sounds, S1 normal and S2 normal. No murmur heard.  Pulmonary:      Effort: Pulmonary effort is normal.      Breath sounds: Normal breath sounds.   Neurological:      Mental Status: She is alert and oriented to person, place, and time.   Psychiatric:         Mood and Affect: Mood normal.         Behavior: Behavior normal.         Thought Content: Thought content normal.         Judgment: Judgment normal.

## 2025-03-31 ENCOUNTER — APPOINTMENT (OUTPATIENT)
Dept: LAB | Facility: MEDICAL CENTER | Age: 60
End: 2025-03-31
Payer: COMMERCIAL

## 2025-03-31 ENCOUNTER — OFFICE VISIT (OUTPATIENT)
Dept: OBGYN CLINIC | Facility: CLINIC | Age: 60
End: 2025-03-31
Payer: COMMERCIAL

## 2025-03-31 VITALS
DIASTOLIC BLOOD PRESSURE: 74 MMHG | BODY MASS INDEX: 36.02 KG/M2 | HEIGHT: 64 IN | SYSTOLIC BLOOD PRESSURE: 118 MMHG | WEIGHT: 211 LBS

## 2025-03-31 DIAGNOSIS — N91.2 AMENORRHEA: ICD-10-CM

## 2025-03-31 DIAGNOSIS — Z12.31 ENCOUNTER FOR SCREENING MAMMOGRAM FOR BREAST CANCER: ICD-10-CM

## 2025-03-31 DIAGNOSIS — Z87.410 HISTORY OF CERVICAL DYSPLASIA: ICD-10-CM

## 2025-03-31 DIAGNOSIS — Z11.51 SCREENING FOR HPV (HUMAN PAPILLOMAVIRUS): ICD-10-CM

## 2025-03-31 DIAGNOSIS — Z01.419 ENCOUNTER FOR GYNECOLOGICAL EXAMINATION WITHOUT ABNORMAL FINDING: Primary | ICD-10-CM

## 2025-03-31 DIAGNOSIS — Z12.4 ENCOUNTER FOR PAPANICOLAOU SMEAR FOR CERVICAL CANCER SCREENING: ICD-10-CM

## 2025-03-31 DIAGNOSIS — Z30.431 SURVEILLANCE OF INTRAUTERINE CONTRACEPTIVE DEVICE: ICD-10-CM

## 2025-03-31 LAB
ESTRADIOL SERPL-MCNC: 37.7 PG/ML
FSH SERPL-ACNC: 53.7 MIU/ML

## 2025-03-31 PROCEDURE — 36415 COLL VENOUS BLD VENIPUNCTURE: CPT

## 2025-03-31 PROCEDURE — G0145 SCR C/V CYTO,THINLAYER,RESCR: HCPCS | Performed by: NURSE PRACTITIONER

## 2025-03-31 PROCEDURE — 83001 ASSAY OF GONADOTROPIN (FSH): CPT

## 2025-03-31 PROCEDURE — G0476 HPV COMBO ASSAY CA SCREEN: HCPCS | Performed by: NURSE PRACTITIONER

## 2025-03-31 PROCEDURE — S0610 ANNUAL GYNECOLOGICAL EXAMINA: HCPCS | Performed by: NURSE PRACTITIONER

## 2025-03-31 PROCEDURE — 82670 ASSAY OF TOTAL ESTRADIOL: CPT

## 2025-03-31 NOTE — PATIENT INSTRUCTIONS
"Patient Education     Your baby's movement before birth   The Basics   Written by the doctors and editors at [facility]   When should I start feeling my baby move? -- It depends. Most people first feel their baby moving in the uterus between about 16 and 20 weeks of pregnancy. It might take longer to feel movement if this is your first pregnancy or if the placenta is in the front of your uterus.  What kinds of movements should I feel? -- When you first feel your baby move, it might feel like a gentle flutter in your belly. This is sometimes called \"quickening.\" As the baby grows, their movements will get stronger. You will probably feel them kicking, rolling, and stretching. Later in pregnancy, you might be able to see and feel the baby moving from the outside.  You might notice that your baby is more active at certain times of the day or night. Even before birth, babies have periods of being asleep and awake. When your baby is sleeping, you might notice that they do not move as much.  Should I keep track of my baby's movements? -- If your pregnancy is healthy, you probably do not need to count or record your baby's movements. Feeling regular movement is a good sign that the baby is doing well.  In some cases, your doctor or midwife might ask you to keep track of your baby's movements. If so, they will tell you how to do this and when to call them.  A change in your baby's movements does not always mean that there is a problem. But in some cases, it can be a sign that the baby is having trouble. If your doctor or midwife is concerned, they can do tests to check on the baby.  If I am asked to track movement, how should I do it? -- There are different ways of tracking your baby's movement. This is sometimes called \"kick counting.\"  Your doctor or midwife will tell you exactly what to track. For example, they might ask you to write down:   How long it takes to feel 10 kicks or movements   How many times your baby moves " in 1 hour  Many experts consider at least 10 movements in 2 hours to be a sign that the baby is doing well. But there is no specific cutoff for exactly how much movement is healthy or unhealthy. Some babies are more active than others, and some pregnant people feel movement more easily than others. The main goal of kick counting is to get to know your baby's normal patterns so you can tell if anything changes.  If you are doing kick counting:   Choose a time of day when your baby is usually active.   Find a quiet place where you will not be distracted.   Lie down on your side in a comfortable position.   Check the clock, or set a timer.   Each time you feel your baby move or kick, write down the time. Some people use a smartphone josue to keep track.   If your baby seems less active than usual, try moving around, eating a snack, and emptying your bladder. This can help wake the baby up if they are asleep.   Stop counting after you have felt 10 kicks, or after the length of time your doctor or midwife told you.  When should I call the doctor? -- Call your doctor or midwife for advice if:   You have concerns about your baby's movement.   Your baby is moving less than they normally do.   You notice a sudden change in the pattern of your baby's movements.   You have any other symptoms that worry you.  All topics are updated as new evidence becomes available and our peer review process is complete.  This topic retrieved from [employer] on: Feb 26, 2024.  Topic 898975 Version 1.0  Release: 32.2.4 - C32.56  © 2024 UpToDate, Inc. and/or its affiliates. All rights reserved.  Consumer Information Use and Disclaimer   Disclaimer: This generalized information is a limited summary of diagnosis, treatment, and/or medication information. It is not meant to be comprehensive and should be used as a tool to help the user understand and/or assess potential diagnostic and treatment options. It does NOT include all information about  conditions, treatments, medications, side effects, or risks that may apply to a specific patient. It is not intended to be medical advice or a substitute for the medical advice, diagnosis, or treatment of a health care provider based on the health care provider's examination and assessment of a patient's specific and unique circumstances. Patients must speak with a health care provider for complete information about their health, medical questions, and treatment options, including any risks or benefits regarding use of medications. This information does not endorse any treatments or medications as safe, effective, or approved for treating a specific patient. UpToDate, Inc. and its affiliates disclaim any warranty or liability relating to this information or the use thereof.The use of this information is governed by the Terms of Use, available at https://www.Awesome.me.com/en/know/clinical-effectiveness-terms. 2024© UpToDate, Inc. and its affiliates and/or licensors. All rights reserved.  Copyright   © 2024 UpToDate, Inc. and/or its affiliates. All rights reserved.  Patient Education     Calcium Rich Diet   About this topic   Calcium is one of the most important minerals and is found in almost all parts of your body. It makes your teeth and bones strong and healthy. Your body does not make calcium. It is important for you to eat calcium rich foods to get enough calcium. It also helps your body:  Make blood clots  Keep your heartbeat normal  Helps blood move throughout the body  Release hormones  Release enzymes  Send and get signals between your nerves and brain  Make muscles work well         What will the results be?   It is important to have a good amount of calcium in your food. Calcium helps your body work well. It is very important during every life stage. Calcium may also keep you from losing bone mass. This is osteopenia. It may help keep you from having weak bones. This is osteoporosis.  What drugs may be  needed?   The doctor may order calcium and vitamin D supplements. You need vitamin D to help your body take in the calcium. Your calcium supplement may have vitamin D in it.  Talk to your doctor about:  If it is OK to take your calcium with food.  How often to take your calcium supplement throughout the day.  All the drugs you are taking. Be sure to include all prescription and over-the-counter (OTC) drugs, and herbal supplements. Tell the doctor about any drug allergy. Bring a list of drugs you take with you. Some drugs may cause problems with how your body takes in calcium.  Will physical activity be limited?   No, physical activities will not be limited. It is good for you to do light exercises and to stay active.  What changes to diet are needed?   You will need to watch how much calcium is in the foods you eat. Your doctor will talk to you about the right amount of calcium for you. How much calcium you need is based on your age and life stage.  When is this diet used?   This diet is used when your normal diet is low in calcium. It is needed to raise the amount of calcium in your diet. Our bodies do not take in calcium well as we get older.  Who should not use this diet?   People with too much calcium in their blood should not use this diet. This is called hypercalcemia.  What foods are good to eat?   Milk, yogurt, and cheese products are naturally high in calcium.  These foods have smaller amounts of calcium. If they are eaten often and in large amounts they can be good sources of calcium:  Oysters; dried fruit like figs and raisins; green leafy vegetables like broccoli, collards, kale, mustard greens, bok mari; soy beans; oranges  [place] and sardines. Be sure to eat the soft bones.  Nuts like almonds and Brazil nuts, sunflower seeds, tahini, dried beans  Food products with calcium added to them like orange juice, tofu, cereal, bread; almond milk, rice milk, soy milk  What foods should be limited or avoided?    People who eat many kinds of foods do not have to be worried about limiting or avoiding certain foods.   What problems could happen?   Some people may get kidney stones. This may happen after taking high amounts of calcium over a long time. Calcium from food does not seem to cause kidney stones.  Hard stools.  Too much calcium may interfere with your body’s ability to absorb iron and zinc.  When do I need to call the doctor?   Call your doctor if you have concerns about your diet. Tell your doctor if you are allergic to any of the foods in your diet.  Helpful tips   If you have problems digesting milk, try lactose-free milk. You may also use a product to help you take in lactose.  Talk with your doctor if you are a vegetarian and do not eat dairy products. Your doctor can help you make sure you get the amount of calcium your body needs.  Last Reviewed Date   2021-03-12  Consumer Information Use and Disclaimer   This generalized information is a limited summary of diagnosis, treatment, and/or medication information. It is not meant to be comprehensive and should be used as a tool to help the user understand and/or assess potential diagnostic and treatment options. It does NOT include all information about conditions, treatments, medications, side effects, or risks that may apply to a specific patient. It is not intended to be medical advice or a substitute for the medical advice, diagnosis, or treatment of a health care provider based on the health care provider's examination and assessment of a patient’s specific and unique circumstances. Patients must speak with a health care provider for complete information about their health, medical questions, and treatment options, including any risks or benefits regarding use of medications. This information does not endorse any treatments or medications as safe, effective, or approved for treating a specific patient. UpToDate, Inc. and its affiliates disclaim any warranty  or liability relating to this information or the use thereof. The use of this information is governed by the Terms of Use, available at https://www.wolterskluwer.com/en/know/clinical-effectiveness-terms   Copyright   Copyright © 2024 UpToDate, Inc. and its affiliates and/or licensors. All rights reserved.  Patient Education     Exercise and movement   The Basics   Written by the doctors and editors at [employer]   What are the benefits of movement? -- Moving your body has many benefits. It can:   Burn calories, which helps people manage their weight   Help control blood sugar levels in people with diabetes   Lower blood pressure, especially in people with high blood pressure   Lower stress, and help with depression and anxiety   Keep bones strong, so they don't get thin and break easily   Lower the chance of dying from heart disease  Adding even small amounts of physical activity to your daily routine can improve your health.  What are the main types of exercise? -- There are 3 main types of exercise:   Aerobic exercise - This raises your heart rate. Examples include walking, running, dancing, riding a bike, and swimming.   Muscle strengthening - This helps make your muscles stronger. You can do it using weights, exercise bands, or weight machines. You can also use your own body weight, as with push-ups, or by lifting items in your home, like jugs of water.   Stretching - These help your muscles and joints move more easily.  It's important to have all 3 types in your exercise program. That way, your body, muscles, and joints can be as healthy as possible.  Should I talk to my doctor or nurse before I start exercising? -- If you have not exercised before or have not exercised in a long time, talk with your doctor or nurse before you start a very active exercise program.  If you have heart disease or risk factors for heart disease (like high blood pressure or diabetes), your doctor or nurse might recommend that you have  an exercise test before starting an exercise program.  When you begin an exercise program, start slowly. For example, do the exercise at a slow pace or for a few minutes only. Over time, you can exercise faster and for longer periods of time.  What should I do when I exercise? -- Each time you exercise, you should:   Warm up - This can help keep you from hurting your muscles when you exercise. To warm up, do a light aerobic exercise (such as walking slowly) or stretch for 5 to 10 minutes.   Work out - Try to get a mix of aerobic exercise, muscle strengthening, and stretching. During an aerobic workout, you can walk fast, swim, run, or use an exercise machine, for example. Other activities, like dancing or playing tennis, are also forms of aerobic exercise. You should also take time to stretch all of your joints, including your neck, shoulders, back, hips, and knees. At least 2 times a week, you can do muscle strengthening exercises as part of your workout.   Cool down - This helps keep you from feeling dizzy after you exercise and helps prevent muscle cramps. To cool down, you can stretch or do a light aerobic exercise for 5 minutes.  Some people go to a gym or do group exercise classes. But you can exercise even without these things. Some exercises can be done even in a small space. You can also try online videos or smartphone apps to get ideas for different types of exercise.  How often should I exercise? -- Doctors recommend that people exercise at least 30 minutes a day, on 5 or more days of the week.  If you can't exercise for 30 minutes straight, try to exercise for 10 minutes at a time, 3 or 4 times a day. Even exercising for shorter amounts of time is good for you, especially if it means spending less time sitting.  When should I call my doctor or nurse? -- If you have any of the following symptoms when you exercise, stop exercising and call your doctor or nurse right away:   Pain or pressure in your chest,  arms, throat, jaw, or back   Nausea or vomiting   Feeling like your heart is fluttering or racing very fast   Feeling dizzy or faint  What if I don't have time to exercise? -- Many people have very busy lives and might not think that they have time to exercise. But it's important to try to find time, even if you are tired or work a lot. Exercise can increase your energy level, which can make you feel better and might even help you get more work done.  Even if it's hard to set aside a lot of time to exercise, you can still improve your health by moving your body more. There are many ways that you can be more active. For example, you can:   Take the stairs instead of the elevator.   Park in a parking space that is farther away from the door.   Take a longer route when you walk from one place to another.  Spending a lot of time sitting still (for example, watching TV or working on the computer) is bad for your health. Try to get up and move around whenever you can. Even small amounts of movement, like taking short walks, doing household chores, or gardening, can improve your health. Finding activities that you enjoy, or doing them with other people, can help you add more movement into your daily life.  What else should I do when I exercise? -- To exercise safely and avoid problems, it's important to:   Drink fluids during and after exercising (but avoid drinks with a lot of caffeine or sugar).   Avoid exercising outside if it is too hot or cold.   Wear layers of clothes, so that you can take them off if you get too hot.   Wear shoes that fit well and support your feet.   Be aware of your surroundings if you exercise outside.  All topics are updated as new evidence becomes available and our peer review process is complete.  This topic retrieved from [employer] on: May 18, 2024.  Topic 98607 Version 31.0  Release: 32.4.3 - C32.137  © 2024 UpToDate, Inc. and/or its affiliates. All rights reserved.  Consumer Information Use  and Disclaimer   Disclaimer: This generalized information is a limited summary of diagnosis, treatment, and/or medication information. It is not meant to be comprehensive and should be used as a tool to help the user understand and/or assess potential diagnostic and treatment options. It does NOT include all information about conditions, treatments, medications, side effects, or risks that may apply to a specific patient. It is not intended to be medical advice or a substitute for the medical advice, diagnosis, or treatment of a health care provider based on the health care provider's examination and assessment of a patient's specific and unique circumstances. Patients must speak with a health care provider for complete information about their health, medical questions, and treatment options, including any risks or benefits regarding use of medications. This information does not endorse any treatments or medications as safe, effective, or approved for treating a specific patient. UpToDate, Inc. and its affiliates disclaim any warranty or liability relating to this information or the use thereof.The use of this information is governed by the Terms of Use, available at https://www.woltersFeeFightersuwer.com/en/know/clinical-effectiveness-terms. 2024© UpToDate, Inc. and its affiliates and/or licensors. All rights reserved.  Copyright   © 2024 UpToDate, Inc. and/or its affiliates. All rights reserved.

## 2025-03-31 NOTE — PROGRESS NOTES
Assessment / Plan    Assessment & Plan  Encounter for gynecological examination without abnormal finding  Normal well woman exam.  Pap with hpv updated.  Up to date on colonoscopy       Encounter for Papanicolaou smear for cervical cancer screening    Orders:    Liquid-based pap, screening    Screening for HPV (human papillomavirus)    Orders:    Liquid-based pap, screening    History of cervical dysplasia    Orders:    Liquid-based pap, screening    Encounter for screening mammogram for breast cancer  Order placed for update    Orders:    Mammo screening bilateral w 3d and cad; Future    Surveillance of intrauterine contraceptive device  Normal IUD check.  Await e2 and fsh to determine removal.       Amenorrhea    Orders:    Estradiol; Future    Follicle stimulating hormone; Future        Subjective      Estefania Avila is a 59 y.o. female who presents for her annual gynecologic exam.    60 yo      2022 paragard removed and replaced with Mirena IUD due to menorrhagia.  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    Last pap: 2021 pap/hpv negative  Pap hx:  LGSIL, + other HPV; Colpo JANA 1  STD hx:  Sexually active:  Last mammogram: 2024 normal  Colonoscopy, 10/2023, normal, 10 yr update  Nutrition: Body mass index is 36.22 kg/m².; she sees [facility], taking Zepbound.  Calcium intake: given guidelines  Exercise:  given guidelines  Safety:    Periods are   Current contraception: IUD, Mirena, 2022  History of abnormal Pap smear: yes   Family history of breast,uterine, ovarian or colon cancer: yes - MGM ovarian ca    The following portions of the patient's history were reviewed and updated as appropriate: allergies, current medications, past family history, past medical history, past social history, past surgical history, and problem list.    Review of Systems      Review of Systems   Constitutional:  Negative for chills and fever.   Respiratory:  Negative for  "cough and shortness of breath.    Gastrointestinal:  Negative for abdominal distention, abdominal pain, blood in stool, constipation, diarrhea, nausea and vomiting.   Genitourinary:  Negative for difficulty urinating, dysuria, frequency, genital sores, hematuria, menstrual problem, pelvic pain, urgency, vaginal bleeding and vaginal discharge.   Musculoskeletal:  Negative for arthralgias and myalgias.     Breasts:  Negative for skin changes, dimpling, asymmetry, nipple discharge, redness, tenderness or palpable masses      Objective     *patient agrees to exam without a chaperone present.     /74 (BP Location: Left arm, Patient Position: Sitting, Cuff Size: Standard)   Ht 5' 4\" (1.626 m)   Wt 95.7 kg (211 lb)   LMP  (LMP Unknown)   BMI 36.22 kg/m²   Physical Exam  Constitutional:       General: She is not in acute distress.     Appearance: Normal appearance. She is well-developed. She is not ill-appearing or diaphoretic.      Comments: Body mass index is 36.22 kg/m².     HENT:      Head: Normocephalic and atraumatic.   Eyes:      Pupils: Pupils are equal, round, and reactive to light.   Neck:      Thyroid: No thyromegaly.   Pulmonary:      Effort: Pulmonary effort is normal.   Chest:   Breasts:     Breasts are symmetrical.      Right: No inverted nipple, mass, nipple discharge, skin change or tenderness.      Left: No inverted nipple, mass, nipple discharge, skin change or tenderness.   Abdominal:      General: There is no distension.      Palpations: Abdomen is soft. There is no mass.      Tenderness: There is no abdominal tenderness. There is no guarding or rebound.   Genitourinary:     General: Normal vulva.      Exam position: Lithotomy position.      Labia:         Right: No rash, tenderness, lesion or injury.         Left: No rash, tenderness, lesion or injury.       Vagina: No signs of injury and foreign body. No vaginal discharge, erythema, tenderness or bleeding.      Cervix: No cervical motion " tenderness, discharge or friability.      Uterus: Not enlarged and not tender.       Adnexa:         Right: No mass or tenderness.          Left: No mass or tenderness.        Rectum: Normal.         Musculoskeletal:      Cervical back: Neck supple.   Lymphadenopathy:      Cervical: No cervical adenopathy.      Upper Body:      Right upper body: No supraclavicular adenopathy.      Left upper body: No supraclavicular adenopathy.   Skin:     General: Skin is warm and dry.   Neurological:      General: No focal deficit present.      Mental Status: She is alert and oriented to person, place, and time.   Psychiatric:         Mood and Affect: Mood normal.         Behavior: Behavior normal.         Thought Content: Thought content normal.         Judgment: Judgment normal.

## 2025-04-01 ENCOUNTER — RESULTS FOLLOW-UP (OUTPATIENT)
Dept: OBGYN CLINIC | Facility: CLINIC | Age: 60
End: 2025-04-01

## 2025-04-03 LAB
LAB AP GYN PRIMARY INTERPRETATION: NORMAL
Lab: NORMAL

## 2025-05-07 DIAGNOSIS — E66.812 OBESITY, CLASS II, BMI 35-39.9: ICD-10-CM

## 2025-05-08 RX ORDER — TIRZEPATIDE 12.5 MG/.5ML
12.5 INJECTION, SOLUTION SUBCUTANEOUS WEEKLY
Qty: 2 ML | Refills: 0 | Status: SHIPPED | OUTPATIENT
Start: 2025-05-08 | End: 2025-06-05

## 2025-05-21 DIAGNOSIS — E66.812 OBESITY, CLASS II, BMI 35-39.9: ICD-10-CM

## 2025-05-22 RX ORDER — TIRZEPATIDE 12.5 MG/.5ML
12.5 INJECTION, SOLUTION SUBCUTANEOUS WEEKLY
Qty: 6 ML | Refills: 0 | Status: SHIPPED | OUTPATIENT
Start: 2025-05-22 | End: 2025-08-14

## 2025-05-28 ENCOUNTER — TELEPHONE (OUTPATIENT)
Dept: BARIATRICS | Facility: CLINIC | Age: 60
End: 2025-05-28

## 2025-06-03 ENCOUNTER — OFFICE VISIT (OUTPATIENT)
Dept: BARIATRICS | Facility: CLINIC | Age: 60
End: 2025-06-03
Payer: COMMERCIAL

## 2025-06-03 VITALS
HEIGHT: 64 IN | HEART RATE: 88 BPM | WEIGHT: 211 LBS | TEMPERATURE: 97.9 F | DIASTOLIC BLOOD PRESSURE: 70 MMHG | SYSTOLIC BLOOD PRESSURE: 112 MMHG | BODY MASS INDEX: 36.02 KG/M2

## 2025-06-03 DIAGNOSIS — Z98.84 BARIATRIC SURGERY STATUS: ICD-10-CM

## 2025-06-03 DIAGNOSIS — K91.2 POSTSURGICAL MALABSORPTION: ICD-10-CM

## 2025-06-03 DIAGNOSIS — E66.812 OBESITY, CLASS II, BMI 35-39.9: Primary | ICD-10-CM

## 2025-06-03 DIAGNOSIS — D68.51 FACTOR V LEIDEN (HCC): ICD-10-CM

## 2025-06-03 PROCEDURE — 99214 OFFICE O/P EST MOD 30 MIN: CPT | Performed by: PHYSICIAN ASSISTANT

## 2025-06-03 NOTE — PROGRESS NOTES
Assessment/Plan:    s/p Vertical Sleeve Gastrectomy with Dr. Ruiz on 4/1/2014. Here for weight gain follow up   - patient started on zepbound per MWM initially, now just started on 12.5 mg dose doing well with appetite suppression but feels her weight loss is progressing slowly. Thinks it may have to do with undereating so was advised to increase calories and food log. No side effects   - patient has seen our dietician several times and eats fairly heathy with mainly protein. She has been doing yoga. Zepbound has also improved her bowel habits and joint pains and general anxiety. She does require a knee replacement and needs to continue losing weight prior to this    Continue 12.5 mg for now. Will then decide to go up to 15 mg or go back to 10 mg for maintenance        Initial weight: 233 lbs  Current  visit: 211 lbs         Patient denies personal and family history of MEN2 tumors and medullary thyroid/thyroid carcinoma. Patient denies personal history of pancreatitis.        Follow up in approximately 3 months     Goals:  Food log (ie.) [email or web address],[email or web address],[email or web address],[email or web address],etc. baritastic  No sugary beverages. At least 64oz of water daily.  Increase physical activity by 10 minutes daily. Gradually increase physical activity to a goal of 5 days per week for 30 minutes of MODERATE intensity PLUS 2 days per week of FULL BODY resistance training  5-10 servings of fruits and vegetables per day and 25-35 grams of dietary fiber per day, gradually increasing       Diagnoses and all orders for this visit:    Obesity, Class II, BMI 35-39.9    Factor V Leiden (HCC)    Bariatric surgery status    Postsurgical malabsorption        Subjective:   Chief Complaint   Patient presents with    Follow-up      3 month Post-op Wt/ gain .     Patient ID: Estefania Avila  is a 59 y.o. female with excess weight/obesity here to pursue weight management.      Food logging:  B: protein shake   S: cottage cheese   L:  "eggs bites and cucumbers   S: occasional yogurt   D: veggies with protein    Colonoscopy-Completed    The following portions of the patient's history were reviewed and updated as appropriate: allergies, current medications, past family history, past medical history, past social history, past surgical history, and problem list.    Review of Systems   Constitutional: Negative.    Respiratory: Negative.     Cardiovascular: Negative.    Gastrointestinal: Negative.    Neurological: Negative.    Psychiatric/Behavioral: Negative.         Objective:    /70 (Patient Position: Sitting, Cuff Size: Standard)   Pulse 88   Temp 97.9 °F (36.6 °C) (Tympanic)   Ht 5' 4\" (1.626 m)   Wt 95.7 kg (211 lb)   BMI 36.22 kg/m²      Physical Exam  Vitals and nursing note reviewed.   Constitutional:       Appearance: Normal appearance. She is obese.   HENT:      Head: Normocephalic and atraumatic.     Eyes:      Extraocular Movements: Extraocular movements intact.       Cardiovascular:      Rate and Rhythm: Normal rate.   Pulmonary:      Effort: Pulmonary effort is normal.   Abdominal:      General: Bowel sounds are normal.     Musculoskeletal:         General: Normal range of motion.      Cervical back: Normal range of motion.     Skin:     General: Skin is warm and dry.     Neurological:      General: No focal deficit present.      Mental Status: She is alert and oriented to person, place, and time.     Psychiatric:         Mood and Affect: Mood normal.         "

## 2025-06-03 NOTE — PROGRESS NOTES
Date of surgery:4/1/2014  Procedure:Sleeve  Performing surgeon:Dr. Ruiz    Initial Weight - 278 lb  Current Weight -211 lb  Pancho Weight - 170 lb  Total Body Weight Loss (EWL)- 67%  EWL% - 53%  TWB % -24%    For *NEW/TRANSFER* patients only: Who referred the patient? Please provide name and specialty (PCP, GI, etc.).

## 2025-06-03 NOTE — PATIENT INSTRUCTIONS
Goals:  Food log (ie.) [email or web address],[email or web address],[email or web address],etc. baritastic  No sugary beverages. At least 64oz of water daily.  Increase physical activity by 10 minutes daily. Gradually increase physical activity to a goal of 5 days per week for 30 minutes of MODERATE intensity PLUS 2 days per week of FULL BODY resistance training  5-10 servings of fruits and vegetables per day and 25-35 grams of dietary fiber per day, gradually increasing

## 2025-06-20 ENCOUNTER — OFFICE VISIT (OUTPATIENT)
Age: 60
End: 2025-06-20
Payer: COMMERCIAL

## 2025-06-20 VITALS — WEIGHT: 211 LBS | BODY MASS INDEX: 36.02 KG/M2 | HEIGHT: 64 IN

## 2025-06-20 DIAGNOSIS — M17.11 PRIMARY OSTEOARTHRITIS OF RIGHT KNEE: Primary | ICD-10-CM

## 2025-06-20 PROCEDURE — 99213 OFFICE O/P EST LOW 20 MIN: CPT | Performed by: PHYSICIAN ASSISTANT

## 2025-06-20 PROCEDURE — 20610 DRAIN/INJ JOINT/BURSA W/O US: CPT | Performed by: PHYSICIAN ASSISTANT

## 2025-06-20 RX ORDER — LIDOCAINE HYDROCHLORIDE 10 MG/ML
4 INJECTION, SOLUTION INFILTRATION; PERINEURAL
Status: COMPLETED | OUTPATIENT
Start: 2025-06-20 | End: 2025-06-20

## 2025-06-20 RX ORDER — TRIAMCINOLONE ACETONIDE 40 MG/ML
40 INJECTION, SUSPENSION INTRA-ARTICULAR; INTRAMUSCULAR
Status: COMPLETED | OUTPATIENT
Start: 2025-06-20 | End: 2025-06-20

## 2025-06-20 RX ADMIN — TRIAMCINOLONE ACETONIDE 40 MG: 40 INJECTION, SUSPENSION INTRA-ARTICULAR; INTRAMUSCULAR at 12:30

## 2025-06-20 RX ADMIN — LIDOCAINE HYDROCHLORIDE 4 ML: 10 INJECTION, SOLUTION INFILTRATION; PERINEURAL at 12:30

## 2025-06-20 NOTE — PROGRESS NOTES
Assessment:  Assessment & Plan  Primary osteoarthritis of right knee       Patient was offered and accepted a right knee intra-articular corticosteroid injection today.  Tylenol as needed.  Activities as tolerated, modification avoid pain.  Follow-up in 3 months for repeat evaluation with repeat intra-articular corticosteroid injection at that time as indicated.           To do next visit:  Return for 3 months with Alan Rubin.    The above stated was discussed in layman's terms and the patient expressed understanding.  All questions were answered to the patient's satisfaction.         Subjective:   Estefania Avila is a 59 y.o. female who presents for follow-up regarding her right knee DJD.  She was last seen on 3/21/2025.  At that time she received a right knee intra-articular corticosteroid injection.  She returns to the office today noting a gradual return of her pain over the past week or so.  She denies any recent injury or trauma.  She states her pain is not as severe as her initial presentation.  She notes generalized discomfort rated 3 out of 10 in intensity.  She denies any significant swelling but does report some stiffness.  No weakness or instability.  Pain is worse with certain movements and increased activity.  She takes Tylenol on occasion.  No numbness or tingling.  No fevers or chills.  She is interested in repeat injection today as she is traveling to the [place] next week.      Review of systems negative unless otherwise specified in HPI      Past Medical History[1]    Past Surgical History[2]    Family History[3]    Social History     Occupational History    Not on file   Tobacco Use    Smoking status: Never     Passive exposure: Never    Smokeless tobacco: Never    Tobacco comments:     current non-smoker, per allscripts   Vaping Use    Vaping status: Never Used   Substance and Sexual Activity    Alcohol use: Yes     Alcohol/week: 2.0 standard drinks of alcohol     Types: 2 Standard drinks  "or equivalent per week     Comment: Socially    Drug use: No    Sexual activity: Yes     Partners: Male     Birth control/protection: I.U.D.     Comment: same partner, 10 yrs       Current Medications[4]    Allergies[5]       There were no vitals filed for this visit.    Objective:  Gen: No acute distress, resting comfortably in bed  HEENT: Eyes clear, moist mucus membranes, hearing intact  Respiratory: No audible wheezing or stridor  Cardiovascular: Well Perfused peripherally, 2+ distal pulse  Abdomen: nondistended, no peritoneal signs                     Right knee: No gross deformity. Skin is intact without erythema ecchymosis or swelling. No effusion. No joint line tenderness. Range of motion is full extension and flexion to 130 degrees with crepitation appreciated. Stable to varus and valgus rest without pain. Extensor mechanism is intact. Sensation is intact distally.     Large joint arthrocentesis: R knee    Performed by: Alan Rubin PA-C  Authorized by: Alan Rubin PA-C    Is this a Visco injection? NoProcedure Details  Location: knee - R knee  Needle size: 22 G  Ultrasound guidance: no  Approach: anterolateral  Medications administered: 4 mL lidocaine 1 %; 40 mg triamcinolone acetonide 40 mg/mL    Patient tolerance: patient tolerated the procedure well with no immediate complications  Dressing:  Sterile dressing applied            Portions of the record may have been created with voice recognition software.  Occasional wrong word or \"sound a like\" substitutions may have occurred due to the inherent limitations of voice recognition software.  Read the chart carefully and recognize, using context, where substitutions have occurred.         [1]   Past Medical History:  Diagnosis Date    Abnormal Pap smear of cervix 05/2016 5/2016 LGSIL, + other HPV; 6/2016 Colpo JANA 1. 12/2021 pap neg/ HPV neg.    Anemia     Arthritis 2018    Beginning of menopause    DVT (deep venous thrombosis) (HCC)     " Factor V Leiden mutation (HCC)     GERD (gastroesophageal reflux disease)     Obesity, Class II, BMI 35-39.9     PONV (postoperative nausea and vomiting)     Pulmonary embolism (HCC)     S/P gastric surgery     gastric sleeve     Sleep apnea     wears c-pap, Last assessed: 3/7/14   [2]   Past Surgical History:  Procedure Laterality Date    BARIATRIC SURGERY      CHOLECYSTECTOMY      COLONOSCOPY      complete, resolved: 1996    EGD AND COLONOSCOPY N/A 03/08/2018    Procedure: EGD AND COLONOSCOPY;  Surgeon: Joy Elias MD;  Location: [facility];  Service: Gastroenterology    GALLBLADDER SURGERY      IVC FILTER INSERTION  04/01/2014    Radiologic Supervision: Felicitas Filter Placement in IVC    MOUTH SURGERY      STOMACH SURGERY  04/01/2014    sleeve, Gastric surgery for Morbid Obesity Laparoscopic Longitudinal Gastrectomy, per allscripts    UPPER GASTROINTESTINAL ENDOSCOPY     [3]   Family History  Problem Relation Name Age of Onset    Osteoporosis Mother aimee     Diabetes Father yissel     Heart disease Father yissel     Arthritis Father yissel     Heart failure Father yissel     Hypertension Father yissel     Ovarian cancer Maternal Grandmother Korina 82    No Known Problems Maternal Grandfather      No Known Problems Paternal Grandmother      No Known Problems Paternal Grandfather      No Known Problems Paternal Aunt      No Known Problems Paternal Aunt      No Known Problems Paternal Aunt      No Known Problems Paternal Aunt      Colon cancer Neg Hx      Breast cancer Neg Hx      Uterine cancer Neg Hx     [4]   Current Outpatient Medications:     Cholecalciferol ([employer]S D3) 50 MCG (2000 UT) CAPS, Take by mouth, Disp: , Rfl:     cyanocobalamin (VITAMIN B-12) 2000 MCG tablet, Take 2,000 mcg by mouth in the morning., Disp: , Rfl:     ferrous sulfate 325 (65 Fe) mg tablet, Take 325 mg by mouth daily with breakfast, Disp: , Rfl:     levonorgestrel (MIRENA) 20 MCG/24HR IUD, 1 each by Intrauterine route once, Disp:  , Rfl:     Multiple Vitamin (MULTIVITAMIN) capsule, Take 1 capsule by mouth in the morning., Disp: , Rfl:     tirzepatide (Zepbound) 12.5 mg/0.5 mL auto-injector, Inject 0.5 mL (12.5 mg total) under the skin once a week, Disp: 6 mL, Rfl: 0  [5]   Allergies  Allergen Reactions    Pseudoephedrine Tachycardia    Latex Rash

## 2025-07-25 ENCOUNTER — TELEPHONE (OUTPATIENT)
Age: 60
End: 2025-07-25

## 2025-08-01 ENCOUNTER — TELEPHONE (OUTPATIENT)
Dept: BARIATRICS | Facility: CLINIC | Age: 60
End: 2025-08-01

## 2025-08-18 ENCOUNTER — OFFICE VISIT (OUTPATIENT)
Dept: FAMILY MEDICINE CLINIC | Facility: CLINIC | Age: 60
End: 2025-08-18
Payer: COMMERCIAL

## 2025-08-18 VITALS
WEIGHT: 209 LBS | SYSTOLIC BLOOD PRESSURE: 138 MMHG | HEIGHT: 64 IN | DIASTOLIC BLOOD PRESSURE: 80 MMHG | OXYGEN SATURATION: 98 % | HEART RATE: 74 BPM | BODY MASS INDEX: 35.68 KG/M2

## 2025-08-18 DIAGNOSIS — M54.50 ACUTE RIGHT-SIDED LOW BACK PAIN WITHOUT SCIATICA: Primary | ICD-10-CM

## 2025-08-18 PROBLEM — Z86.16 PERSONAL HISTORY OF COVID-19: Status: RESOLVED | Noted: 2021-11-12 | Resolved: 2025-08-18

## 2025-08-18 PROBLEM — Z12.11 ENCOUNTER FOR SCREENING COLONOSCOPY: Status: RESOLVED | Noted: 2023-08-03 | Resolved: 2025-08-18

## 2025-08-18 PROBLEM — F43.23 ADJUSTMENT DISORDER WITH MIXED ANXIETY AND DEPRESSED MOOD: Status: RESOLVED | Noted: 2021-11-12 | Resolved: 2025-08-18

## 2025-08-18 PROCEDURE — 99214 OFFICE O/P EST MOD 30 MIN: CPT | Performed by: FAMILY MEDICINE

## 2025-08-18 PROCEDURE — 96372 THER/PROPH/DIAG INJ SC/IM: CPT

## 2025-08-18 RX ORDER — TIRZEPATIDE 12.5 MG/.5ML
12.5 INJECTION, SOLUTION SUBCUTANEOUS WEEKLY
COMMUNITY
Start: 2025-08-06

## 2025-08-18 RX ORDER — KETOROLAC TROMETHAMINE 30 MG/ML
60 INJECTION, SOLUTION INTRAMUSCULAR; INTRAVENOUS ONCE
Status: COMPLETED | OUTPATIENT
Start: 2025-08-18 | End: 2025-08-18

## 2025-08-18 RX ORDER — METHYLPREDNISOLONE 4 MG/1
TABLET ORAL
Qty: 21 EACH | Refills: 0 | Status: SHIPPED | OUTPATIENT
Start: 2025-08-18

## 2025-08-18 RX ORDER — TIZANIDINE 2 MG/1
2 TABLET ORAL EVERY 8 HOURS PRN
Qty: 30 TABLET | Refills: 0 | Status: SHIPPED | OUTPATIENT
Start: 2025-08-18

## 2025-08-18 RX ADMIN — KETOROLAC TROMETHAMINE 60 MG: 30 INJECTION, SOLUTION INTRAMUSCULAR; INTRAVENOUS at 10:51
